# Patient Record
Sex: FEMALE | Race: WHITE | NOT HISPANIC OR LATINO | Employment: UNEMPLOYED | URBAN - METROPOLITAN AREA
[De-identification: names, ages, dates, MRNs, and addresses within clinical notes are randomized per-mention and may not be internally consistent; named-entity substitution may affect disease eponyms.]

---

## 2017-01-04 ENCOUNTER — COMMUNICATION - HEALTHEAST (OUTPATIENT)
Dept: FAMILY MEDICINE | Facility: CLINIC | Age: 62
End: 2017-01-04

## 2017-01-04 DIAGNOSIS — G43.909 MIGRAINE: ICD-10-CM

## 2017-01-04 DIAGNOSIS — G43.909 MIGRAINE HEADACHE: ICD-10-CM

## 2017-02-06 ENCOUNTER — COMMUNICATION - HEALTHEAST (OUTPATIENT)
Dept: FAMILY MEDICINE | Facility: CLINIC | Age: 62
End: 2017-02-06

## 2017-02-06 DIAGNOSIS — G43.909 MIGRAINE: ICD-10-CM

## 2017-02-06 DIAGNOSIS — G43.909 MIGRAINE HEADACHE: ICD-10-CM

## 2017-02-06 DIAGNOSIS — E03.9 UNSPECIFIED HYPOTHYROIDISM: ICD-10-CM

## 2017-03-10 ENCOUNTER — COMMUNICATION - HEALTHEAST (OUTPATIENT)
Dept: FAMILY MEDICINE | Facility: CLINIC | Age: 62
End: 2017-03-10

## 2017-03-10 DIAGNOSIS — G43.909 MIGRAINE: ICD-10-CM

## 2017-03-10 DIAGNOSIS — G43.909 MIGRAINE HEADACHE: ICD-10-CM

## 2017-04-10 ENCOUNTER — COMMUNICATION - HEALTHEAST (OUTPATIENT)
Dept: FAMILY MEDICINE | Facility: CLINIC | Age: 62
End: 2017-04-10

## 2017-04-10 DIAGNOSIS — G43.909 MIGRAINE: ICD-10-CM

## 2017-04-10 DIAGNOSIS — G43.909 MIGRAINE HEADACHE: ICD-10-CM

## 2017-07-19 ENCOUNTER — COMMUNICATION - HEALTHEAST (OUTPATIENT)
Dept: FAMILY MEDICINE | Facility: CLINIC | Age: 62
End: 2017-07-19

## 2017-07-19 DIAGNOSIS — G43.909 MIGRAINE: ICD-10-CM

## 2017-07-29 ENCOUNTER — COMMUNICATION - HEALTHEAST (OUTPATIENT)
Dept: FAMILY MEDICINE | Facility: CLINIC | Age: 62
End: 2017-07-29

## 2017-07-29 DIAGNOSIS — G43.909 MIGRAINE: ICD-10-CM

## 2017-08-01 ENCOUNTER — COMMUNICATION - HEALTHEAST (OUTPATIENT)
Dept: FAMILY MEDICINE | Facility: CLINIC | Age: 62
End: 2017-08-01

## 2017-08-01 DIAGNOSIS — E03.9 UNSPECIFIED HYPOTHYROIDISM: ICD-10-CM

## 2017-08-01 DIAGNOSIS — I10 ESSENTIAL HYPERTENSION: ICD-10-CM

## 2017-08-20 ENCOUNTER — COMMUNICATION - HEALTHEAST (OUTPATIENT)
Dept: FAMILY MEDICINE | Facility: CLINIC | Age: 62
End: 2017-08-20

## 2017-08-20 DIAGNOSIS — I10 UNSPECIFIED ESSENTIAL HYPERTENSION: ICD-10-CM

## 2017-09-12 ENCOUNTER — COMMUNICATION - HEALTHEAST (OUTPATIENT)
Dept: FAMILY MEDICINE | Facility: CLINIC | Age: 62
End: 2017-09-12

## 2017-09-12 DIAGNOSIS — G43.909 MIGRAINE: ICD-10-CM

## 2017-09-28 ENCOUNTER — COMMUNICATION - HEALTHEAST (OUTPATIENT)
Dept: FAMILY MEDICINE | Facility: CLINIC | Age: 62
End: 2017-09-28

## 2017-09-28 DIAGNOSIS — G43.909 MIGRAINE: ICD-10-CM

## 2017-10-11 ENCOUNTER — COMMUNICATION - HEALTHEAST (OUTPATIENT)
Dept: FAMILY MEDICINE | Facility: CLINIC | Age: 62
End: 2017-10-11

## 2017-10-11 DIAGNOSIS — G43.909 MIGRAINE: ICD-10-CM

## 2017-11-07 ENCOUNTER — COMMUNICATION - HEALTHEAST (OUTPATIENT)
Dept: FAMILY MEDICINE | Facility: CLINIC | Age: 62
End: 2017-11-07

## 2017-11-07 DIAGNOSIS — G43.909 MIGRAINE: ICD-10-CM

## 2017-11-08 ENCOUNTER — COMMUNICATION - HEALTHEAST (OUTPATIENT)
Dept: FAMILY MEDICINE | Facility: CLINIC | Age: 62
End: 2017-11-08

## 2017-11-08 DIAGNOSIS — I10 ESSENTIAL HYPERTENSION: ICD-10-CM

## 2017-11-08 DIAGNOSIS — G43.909 MIGRAINE: ICD-10-CM

## 2017-11-14 ENCOUNTER — COMMUNICATION - HEALTHEAST (OUTPATIENT)
Dept: FAMILY MEDICINE | Facility: CLINIC | Age: 62
End: 2017-11-14

## 2017-11-14 DIAGNOSIS — I10 ESSENTIAL HYPERTENSION: ICD-10-CM

## 2017-11-17 ENCOUNTER — COMMUNICATION - HEALTHEAST (OUTPATIENT)
Dept: FAMILY MEDICINE | Facility: CLINIC | Age: 62
End: 2017-11-17

## 2017-11-17 DIAGNOSIS — I10 ESSENTIAL HYPERTENSION: ICD-10-CM

## 2017-11-28 ENCOUNTER — COMMUNICATION - HEALTHEAST (OUTPATIENT)
Dept: FAMILY MEDICINE | Facility: CLINIC | Age: 62
End: 2017-11-28

## 2017-11-28 DIAGNOSIS — G43.909 MIGRAINE: ICD-10-CM

## 2017-11-28 DIAGNOSIS — E03.9 HYPOTHYROIDISM: ICD-10-CM

## 2017-11-30 ENCOUNTER — COMMUNICATION - HEALTHEAST (OUTPATIENT)
Dept: FAMILY MEDICINE | Facility: CLINIC | Age: 62
End: 2017-11-30

## 2017-11-30 DIAGNOSIS — F32.9 MAJOR DEPRESSIVE DISORDER, SINGLE EPISODE: ICD-10-CM

## 2017-12-07 ENCOUNTER — COMMUNICATION - HEALTHEAST (OUTPATIENT)
Dept: FAMILY MEDICINE | Facility: CLINIC | Age: 62
End: 2017-12-07

## 2017-12-07 DIAGNOSIS — G43.909 MIGRAINE: ICD-10-CM

## 2017-12-18 ENCOUNTER — OFFICE VISIT - HEALTHEAST (OUTPATIENT)
Dept: FAMILY MEDICINE | Facility: CLINIC | Age: 62
End: 2017-12-18

## 2017-12-18 DIAGNOSIS — G43.909 MIGRAINE: ICD-10-CM

## 2017-12-18 DIAGNOSIS — I10 ESSENTIAL HYPERTENSION: ICD-10-CM

## 2017-12-18 DIAGNOSIS — F32.9 MAJOR DEPRESSIVE DISORDER, SINGLE EPISODE: ICD-10-CM

## 2017-12-18 DIAGNOSIS — E03.9 HYPOTHYROIDISM: ICD-10-CM

## 2017-12-18 DIAGNOSIS — Z12.31 VISIT FOR SCREENING MAMMOGRAM: ICD-10-CM

## 2017-12-18 LAB
CHOLEST SERPL-MCNC: 157 MG/DL
FASTING STATUS PATIENT QL REPORTED: NO
HDLC SERPL-MCNC: 43 MG/DL
LDLC SERPL CALC-MCNC: 96 MG/DL
TRIGL SERPL-MCNC: 90 MG/DL

## 2018-01-16 ENCOUNTER — COMMUNICATION - HEALTHEAST (OUTPATIENT)
Dept: FAMILY MEDICINE | Facility: CLINIC | Age: 63
End: 2018-01-16

## 2018-01-30 ENCOUNTER — COMMUNICATION - HEALTHEAST (OUTPATIENT)
Dept: FAMILY MEDICINE | Facility: CLINIC | Age: 63
End: 2018-01-30

## 2018-01-30 DIAGNOSIS — G43.909 MIGRAINE: ICD-10-CM

## 2018-03-26 ENCOUNTER — COMMUNICATION - HEALTHEAST (OUTPATIENT)
Dept: FAMILY MEDICINE | Facility: CLINIC | Age: 63
End: 2018-03-26

## 2018-03-26 DIAGNOSIS — G43.909 MIGRAINE: ICD-10-CM

## 2018-04-04 ENCOUNTER — COMMUNICATION - HEALTHEAST (OUTPATIENT)
Dept: FAMILY MEDICINE | Facility: CLINIC | Age: 63
End: 2018-04-04

## 2018-04-04 DIAGNOSIS — G43.909 MIGRAINE: ICD-10-CM

## 2018-05-14 ENCOUNTER — COMMUNICATION - HEALTHEAST (OUTPATIENT)
Dept: FAMILY MEDICINE | Facility: CLINIC | Age: 63
End: 2018-05-14

## 2018-05-14 DIAGNOSIS — G43.909 MIGRAINE: ICD-10-CM

## 2018-06-27 ENCOUNTER — COMMUNICATION - HEALTHEAST (OUTPATIENT)
Dept: FAMILY MEDICINE | Facility: CLINIC | Age: 63
End: 2018-06-27

## 2018-06-27 DIAGNOSIS — G43.909 MIGRAINE: ICD-10-CM

## 2018-08-07 ENCOUNTER — COMMUNICATION - HEALTHEAST (OUTPATIENT)
Dept: FAMILY MEDICINE | Facility: CLINIC | Age: 63
End: 2018-08-07

## 2018-08-07 DIAGNOSIS — G43.909 MIGRAINE: ICD-10-CM

## 2018-08-16 ENCOUNTER — COMMUNICATION - HEALTHEAST (OUTPATIENT)
Dept: FAMILY MEDICINE | Facility: CLINIC | Age: 63
End: 2018-08-16

## 2018-08-16 DIAGNOSIS — E03.9 HYPOTHYROIDISM: ICD-10-CM

## 2018-08-16 DIAGNOSIS — I10 ESSENTIAL HYPERTENSION: ICD-10-CM

## 2018-09-06 ENCOUNTER — COMMUNICATION - HEALTHEAST (OUTPATIENT)
Dept: FAMILY MEDICINE | Facility: CLINIC | Age: 63
End: 2018-09-06

## 2018-09-06 DIAGNOSIS — G43.909 MIGRAINE: ICD-10-CM

## 2018-09-22 ENCOUNTER — COMMUNICATION - HEALTHEAST (OUTPATIENT)
Dept: FAMILY MEDICINE | Facility: CLINIC | Age: 63
End: 2018-09-22

## 2018-09-22 DIAGNOSIS — I10 ESSENTIAL HYPERTENSION: ICD-10-CM

## 2018-10-01 ENCOUNTER — COMMUNICATION - HEALTHEAST (OUTPATIENT)
Dept: FAMILY MEDICINE | Facility: CLINIC | Age: 63
End: 2018-10-01

## 2018-10-01 DIAGNOSIS — G43.909 MIGRAINE: ICD-10-CM

## 2018-10-18 ENCOUNTER — COMMUNICATION - HEALTHEAST (OUTPATIENT)
Dept: FAMILY MEDICINE | Facility: CLINIC | Age: 63
End: 2018-10-18

## 2018-10-18 DIAGNOSIS — G43.909 MIGRAINE: ICD-10-CM

## 2018-11-16 ENCOUNTER — COMMUNICATION - HEALTHEAST (OUTPATIENT)
Dept: FAMILY MEDICINE | Facility: CLINIC | Age: 63
End: 2018-11-16

## 2018-11-16 DIAGNOSIS — G43.909 MIGRAINE: ICD-10-CM

## 2018-11-29 ENCOUNTER — COMMUNICATION - HEALTHEAST (OUTPATIENT)
Dept: FAMILY MEDICINE | Facility: CLINIC | Age: 63
End: 2018-11-29

## 2018-11-29 DIAGNOSIS — G43.909 MIGRAINE: ICD-10-CM

## 2018-11-29 DIAGNOSIS — I10 ESSENTIAL HYPERTENSION: ICD-10-CM

## 2018-12-14 ENCOUNTER — COMMUNICATION - HEALTHEAST (OUTPATIENT)
Dept: FAMILY MEDICINE | Facility: CLINIC | Age: 63
End: 2018-12-14

## 2018-12-14 DIAGNOSIS — E03.9 HYPOTHYROIDISM: ICD-10-CM

## 2019-01-11 ENCOUNTER — COMMUNICATION - HEALTHEAST (OUTPATIENT)
Dept: FAMILY MEDICINE | Facility: CLINIC | Age: 64
End: 2019-01-11

## 2019-01-11 DIAGNOSIS — I10 ESSENTIAL HYPERTENSION: ICD-10-CM

## 2019-01-11 DIAGNOSIS — G43.909 MIGRAINE: ICD-10-CM

## 2019-01-21 ENCOUNTER — COMMUNICATION - HEALTHEAST (OUTPATIENT)
Dept: FAMILY MEDICINE | Facility: CLINIC | Age: 64
End: 2019-01-21

## 2019-01-21 DIAGNOSIS — G43.909 MIGRAINE: ICD-10-CM

## 2019-02-20 ENCOUNTER — COMMUNICATION - HEALTHEAST (OUTPATIENT)
Dept: FAMILY MEDICINE | Facility: CLINIC | Age: 64
End: 2019-02-20

## 2019-02-20 DIAGNOSIS — G43.909 MIGRAINE: ICD-10-CM

## 2019-03-09 ENCOUNTER — COMMUNICATION - HEALTHEAST (OUTPATIENT)
Dept: FAMILY MEDICINE | Facility: CLINIC | Age: 64
End: 2019-03-09

## 2019-03-09 DIAGNOSIS — E03.9 HYPOTHYROIDISM: ICD-10-CM

## 2019-03-09 DIAGNOSIS — G43.909 MIGRAINE: ICD-10-CM

## 2019-03-09 DIAGNOSIS — I10 ESSENTIAL HYPERTENSION: ICD-10-CM

## 2019-03-26 ENCOUNTER — OFFICE VISIT - HEALTHEAST (OUTPATIENT)
Dept: FAMILY MEDICINE | Facility: CLINIC | Age: 64
End: 2019-03-26

## 2019-03-26 DIAGNOSIS — E03.9 HYPOTHYROIDISM: ICD-10-CM

## 2019-03-26 DIAGNOSIS — G43.909 MIGRAINE: ICD-10-CM

## 2019-03-26 DIAGNOSIS — I10 ESSENTIAL HYPERTENSION: ICD-10-CM

## 2019-03-26 DIAGNOSIS — F32.9 MAJOR DEPRESSIVE DISORDER, SINGLE EPISODE: ICD-10-CM

## 2019-03-26 LAB
ALBUMIN SERPL-MCNC: 4.1 G/DL (ref 3.5–5)
ALP SERPL-CCNC: 115 U/L (ref 45–120)
ALT SERPL W P-5'-P-CCNC: 14 U/L (ref 0–45)
ANION GAP SERPL CALCULATED.3IONS-SCNC: 12 MMOL/L (ref 5–18)
AST SERPL W P-5'-P-CCNC: 14 U/L (ref 0–40)
BILIRUB SERPL-MCNC: 0.2 MG/DL (ref 0–1)
BUN SERPL-MCNC: 14 MG/DL (ref 8–22)
CALCIUM SERPL-MCNC: 9.8 MG/DL (ref 8.5–10.5)
CHLORIDE BLD-SCNC: 107 MMOL/L (ref 98–107)
CHOLEST SERPL-MCNC: 196 MG/DL
CO2 SERPL-SCNC: 23 MMOL/L (ref 22–31)
CREAT SERPL-MCNC: 0.65 MG/DL (ref 0.6–1.1)
FASTING STATUS PATIENT QL REPORTED: NO
GFR SERPL CREATININE-BSD FRML MDRD: >60 ML/MIN/1.73M2
GLUCOSE BLD-MCNC: 108 MG/DL (ref 70–125)
HDLC SERPL-MCNC: 72 MG/DL
LDLC SERPL CALC-MCNC: 106 MG/DL
POTASSIUM BLD-SCNC: 3.6 MMOL/L (ref 3.5–5)
PROT SERPL-MCNC: 7.3 G/DL (ref 6–8)
SODIUM SERPL-SCNC: 142 MMOL/L (ref 136–145)
TRIGL SERPL-MCNC: 91 MG/DL
TSH SERPL DL<=0.005 MIU/L-ACNC: 0.06 UIU/ML (ref 0.3–5)

## 2019-03-26 ASSESSMENT — MIFFLIN-ST. JEOR: SCORE: 850.69

## 2019-04-01 ENCOUNTER — COMMUNICATION - HEALTHEAST (OUTPATIENT)
Dept: FAMILY MEDICINE | Facility: CLINIC | Age: 64
End: 2019-04-01

## 2019-04-01 ENCOUNTER — AMBULATORY - HEALTHEAST (OUTPATIENT)
Dept: FAMILY MEDICINE | Facility: CLINIC | Age: 64
End: 2019-04-01

## 2019-04-01 DIAGNOSIS — E03.9 HYPOTHYROIDISM: ICD-10-CM

## 2019-04-21 ENCOUNTER — COMMUNICATION - HEALTHEAST (OUTPATIENT)
Dept: FAMILY MEDICINE | Facility: CLINIC | Age: 64
End: 2019-04-21

## 2019-04-21 DIAGNOSIS — G43.909 MIGRAINE: ICD-10-CM

## 2019-04-25 ENCOUNTER — COMMUNICATION - HEALTHEAST (OUTPATIENT)
Dept: FAMILY MEDICINE | Facility: CLINIC | Age: 64
End: 2019-04-25

## 2019-04-25 DIAGNOSIS — G43.909 MIGRAINE: ICD-10-CM

## 2019-05-03 ENCOUNTER — COMMUNICATION - HEALTHEAST (OUTPATIENT)
Dept: FAMILY MEDICINE | Facility: CLINIC | Age: 64
End: 2019-05-03

## 2019-05-18 ENCOUNTER — COMMUNICATION - HEALTHEAST (OUTPATIENT)
Dept: FAMILY MEDICINE | Facility: CLINIC | Age: 64
End: 2019-05-18

## 2019-05-18 DIAGNOSIS — G43.909 MIGRAINE: ICD-10-CM

## 2019-06-16 ENCOUNTER — COMMUNICATION - HEALTHEAST (OUTPATIENT)
Dept: FAMILY MEDICINE | Facility: CLINIC | Age: 64
End: 2019-06-16

## 2019-06-16 DIAGNOSIS — G43.909 MIGRAINE: ICD-10-CM

## 2019-07-12 ENCOUNTER — COMMUNICATION - HEALTHEAST (OUTPATIENT)
Dept: FAMILY MEDICINE | Facility: CLINIC | Age: 64
End: 2019-07-12

## 2019-07-23 ENCOUNTER — COMMUNICATION - HEALTHEAST (OUTPATIENT)
Dept: FAMILY MEDICINE | Facility: CLINIC | Age: 64
End: 2019-07-23

## 2019-07-23 DIAGNOSIS — G43.909 MIGRAINE: ICD-10-CM

## 2019-09-17 ENCOUNTER — COMMUNICATION - HEALTHEAST (OUTPATIENT)
Dept: FAMILY MEDICINE | Facility: CLINIC | Age: 64
End: 2019-09-17

## 2019-09-17 DIAGNOSIS — G43.909 MIGRAINE: ICD-10-CM

## 2019-09-21 ENCOUNTER — COMMUNICATION - HEALTHEAST (OUTPATIENT)
Dept: FAMILY MEDICINE | Facility: CLINIC | Age: 64
End: 2019-09-21

## 2019-09-21 DIAGNOSIS — I10 ESSENTIAL HYPERTENSION: ICD-10-CM

## 2019-10-13 ENCOUNTER — COMMUNICATION - HEALTHEAST (OUTPATIENT)
Dept: FAMILY MEDICINE | Facility: CLINIC | Age: 64
End: 2019-10-13

## 2019-10-13 DIAGNOSIS — G43.909 MIGRAINE: ICD-10-CM

## 2019-10-13 DIAGNOSIS — I10 ESSENTIAL HYPERTENSION: ICD-10-CM

## 2019-10-13 DIAGNOSIS — E03.9 HYPOTHYROIDISM: ICD-10-CM

## 2019-10-13 DIAGNOSIS — F32.9 MAJOR DEPRESSIVE DISORDER, SINGLE EPISODE: ICD-10-CM

## 2019-12-02 ENCOUNTER — COMMUNICATION - HEALTHEAST (OUTPATIENT)
Dept: FAMILY MEDICINE | Facility: CLINIC | Age: 64
End: 2019-12-02

## 2019-12-02 DIAGNOSIS — G43.909 MIGRAINE: ICD-10-CM

## 2019-12-12 ENCOUNTER — COMMUNICATION - HEALTHEAST (OUTPATIENT)
Dept: FAMILY MEDICINE | Facility: CLINIC | Age: 64
End: 2019-12-12

## 2019-12-12 DIAGNOSIS — I10 ESSENTIAL HYPERTENSION: ICD-10-CM

## 2019-12-12 DIAGNOSIS — G43.909 MIGRAINE: ICD-10-CM

## 2019-12-15 ENCOUNTER — COMMUNICATION - HEALTHEAST (OUTPATIENT)
Dept: FAMILY MEDICINE | Facility: CLINIC | Age: 64
End: 2019-12-15

## 2019-12-15 DIAGNOSIS — G43.909 MIGRAINE: ICD-10-CM

## 2019-12-17 ENCOUNTER — COMMUNICATION - HEALTHEAST (OUTPATIENT)
Dept: FAMILY MEDICINE | Facility: CLINIC | Age: 64
End: 2019-12-17

## 2019-12-17 DIAGNOSIS — I10 ESSENTIAL HYPERTENSION: ICD-10-CM

## 2020-01-06 ENCOUNTER — COMMUNICATION - HEALTHEAST (OUTPATIENT)
Dept: FAMILY MEDICINE | Facility: CLINIC | Age: 65
End: 2020-01-06

## 2020-01-06 DIAGNOSIS — G43.909 MIGRAINE: ICD-10-CM

## 2020-02-08 ENCOUNTER — COMMUNICATION - HEALTHEAST (OUTPATIENT)
Dept: FAMILY MEDICINE | Facility: CLINIC | Age: 65
End: 2020-02-08

## 2020-02-08 DIAGNOSIS — F32.9 MAJOR DEPRESSIVE DISORDER, SINGLE EPISODE: ICD-10-CM

## 2020-02-08 DIAGNOSIS — I10 ESSENTIAL HYPERTENSION: ICD-10-CM

## 2020-02-21 ENCOUNTER — COMMUNICATION - HEALTHEAST (OUTPATIENT)
Dept: FAMILY MEDICINE | Facility: CLINIC | Age: 65
End: 2020-02-21

## 2020-02-21 DIAGNOSIS — E03.9 HYPOTHYROIDISM: ICD-10-CM

## 2020-02-21 DIAGNOSIS — G43.909 MIGRAINE: ICD-10-CM

## 2020-02-24 ENCOUNTER — COMMUNICATION - HEALTHEAST (OUTPATIENT)
Dept: FAMILY MEDICINE | Facility: CLINIC | Age: 65
End: 2020-02-24

## 2020-02-24 DIAGNOSIS — E03.9 HYPOTHYROIDISM: ICD-10-CM

## 2020-03-24 ENCOUNTER — COMMUNICATION - HEALTHEAST (OUTPATIENT)
Dept: FAMILY MEDICINE | Facility: CLINIC | Age: 65
End: 2020-03-24

## 2020-03-25 ENCOUNTER — AMBULATORY - HEALTHEAST (OUTPATIENT)
Dept: FAMILY MEDICINE | Facility: CLINIC | Age: 65
End: 2020-03-25

## 2020-03-25 DIAGNOSIS — F32.9 MAJOR DEPRESSIVE DISORDER, SINGLE EPISODE: ICD-10-CM

## 2020-03-25 DIAGNOSIS — E03.9 HYPOTHYROIDISM: ICD-10-CM

## 2020-03-25 DIAGNOSIS — G43.909 MIGRAINE: ICD-10-CM

## 2020-03-25 DIAGNOSIS — I10 ESSENTIAL HYPERTENSION: ICD-10-CM

## 2020-03-30 ENCOUNTER — COMMUNICATION - HEALTHEAST (OUTPATIENT)
Dept: FAMILY MEDICINE | Facility: CLINIC | Age: 65
End: 2020-03-30

## 2020-03-30 DIAGNOSIS — G43.909 MIGRAINE: ICD-10-CM

## 2020-03-30 DIAGNOSIS — F32.9 MAJOR DEPRESSIVE DISORDER, SINGLE EPISODE: ICD-10-CM

## 2020-03-30 DIAGNOSIS — E03.9 HYPOTHYROIDISM: ICD-10-CM

## 2020-04-02 ENCOUNTER — COMMUNICATION - HEALTHEAST (OUTPATIENT)
Dept: FAMILY MEDICINE | Facility: CLINIC | Age: 65
End: 2020-04-02

## 2020-04-02 DIAGNOSIS — G43.909 MIGRAINE: ICD-10-CM

## 2020-04-02 DIAGNOSIS — E03.9 HYPOTHYROIDISM: ICD-10-CM

## 2020-04-02 DIAGNOSIS — F32.9 MAJOR DEPRESSIVE DISORDER, SINGLE EPISODE: ICD-10-CM

## 2020-04-02 DIAGNOSIS — I10 ESSENTIAL HYPERTENSION: ICD-10-CM

## 2020-05-17 ENCOUNTER — COMMUNICATION - HEALTHEAST (OUTPATIENT)
Dept: FAMILY MEDICINE | Facility: CLINIC | Age: 65
End: 2020-05-17

## 2020-05-17 DIAGNOSIS — I10 ESSENTIAL HYPERTENSION: ICD-10-CM

## 2020-05-17 DIAGNOSIS — G43.909 MIGRAINE: ICD-10-CM

## 2020-05-17 DIAGNOSIS — F32.9 MAJOR DEPRESSIVE DISORDER, SINGLE EPISODE: ICD-10-CM

## 2020-06-05 ENCOUNTER — COMMUNICATION - HEALTHEAST (OUTPATIENT)
Dept: FAMILY MEDICINE | Facility: CLINIC | Age: 65
End: 2020-06-05

## 2020-06-05 DIAGNOSIS — G43.909 MIGRAINE: ICD-10-CM

## 2020-10-05 ENCOUNTER — COMMUNICATION - HEALTHEAST (OUTPATIENT)
Dept: FAMILY MEDICINE | Facility: CLINIC | Age: 65
End: 2020-10-05

## 2020-10-05 DIAGNOSIS — G43.909 MIGRAINE: ICD-10-CM

## 2020-10-21 ENCOUNTER — COMMUNICATION - HEALTHEAST (OUTPATIENT)
Dept: FAMILY MEDICINE | Facility: CLINIC | Age: 65
End: 2020-10-21

## 2020-10-21 DIAGNOSIS — I10 ESSENTIAL HYPERTENSION: ICD-10-CM

## 2020-10-21 DIAGNOSIS — G43.909 MIGRAINE: ICD-10-CM

## 2020-10-21 DIAGNOSIS — E03.9 HYPOTHYROIDISM: ICD-10-CM

## 2020-11-03 ENCOUNTER — AMBULATORY - HEALTHEAST (OUTPATIENT)
Dept: NURSING | Facility: CLINIC | Age: 65
End: 2020-11-03

## 2020-11-16 ENCOUNTER — RECORDS - HEALTHEAST (OUTPATIENT)
Dept: ADMINISTRATIVE | Facility: OTHER | Age: 65
End: 2020-11-16

## 2020-12-18 ENCOUNTER — COMMUNICATION - HEALTHEAST (OUTPATIENT)
Dept: ADMINISTRATIVE | Facility: CLINIC | Age: 65
End: 2020-12-18

## 2020-12-21 ENCOUNTER — COMMUNICATION - HEALTHEAST (OUTPATIENT)
Dept: FAMILY MEDICINE | Facility: CLINIC | Age: 65
End: 2020-12-21

## 2020-12-21 DIAGNOSIS — I10 ESSENTIAL HYPERTENSION: ICD-10-CM

## 2020-12-21 DIAGNOSIS — F32.9 MAJOR DEPRESSIVE DISORDER, SINGLE EPISODE: ICD-10-CM

## 2020-12-21 DIAGNOSIS — G43.909 MIGRAINE: ICD-10-CM

## 2020-12-21 RX ORDER — ESCITALOPRAM OXALATE 20 MG/1
TABLET ORAL
Qty: 90 TABLET | Refills: 0 | Status: SHIPPED | OUTPATIENT
Start: 2020-12-21 | End: 2022-01-28

## 2020-12-21 RX ORDER — INDOMETHACIN 25 MG/1
CAPSULE ORAL
Qty: 270 CAPSULE | Refills: 0 | Status: SHIPPED | OUTPATIENT
Start: 2020-12-21 | End: 2022-01-28

## 2020-12-21 RX ORDER — AMLODIPINE BESYLATE 10 MG/1
TABLET ORAL
Qty: 90 TABLET | Refills: 0 | Status: SHIPPED | OUTPATIENT
Start: 2020-12-21 | End: 2022-01-28

## 2020-12-21 RX ORDER — POTASSIUM CHLORIDE 750 MG/1
TABLET, EXTENDED RELEASE ORAL
Qty: 90 TABLET | Refills: 0 | Status: SHIPPED | OUTPATIENT
Start: 2020-12-21 | End: 2022-01-28

## 2021-02-22 ENCOUNTER — COMMUNICATION - HEALTHEAST (OUTPATIENT)
Dept: FAMILY MEDICINE | Facility: CLINIC | Age: 66
End: 2021-02-22

## 2021-02-22 DIAGNOSIS — I10 ESSENTIAL HYPERTENSION: ICD-10-CM

## 2021-02-22 DIAGNOSIS — F32.9 MAJOR DEPRESSIVE DISORDER, SINGLE EPISODE: ICD-10-CM

## 2021-02-22 DIAGNOSIS — G43.909 MIGRAINE: ICD-10-CM

## 2021-04-07 ENCOUNTER — COMMUNICATION - HEALTHEAST (OUTPATIENT)
Dept: FAMILY MEDICINE | Facility: CLINIC | Age: 66
End: 2021-04-07

## 2021-04-07 DIAGNOSIS — I10 ESSENTIAL HYPERTENSION: ICD-10-CM

## 2021-04-09 RX ORDER — LISINOPRIL 20 MG/1
TABLET ORAL
Qty: 60 TABLET | Refills: 0 | Status: SHIPPED | OUTPATIENT
Start: 2021-04-09 | End: 2021-09-28

## 2021-05-27 ENCOUNTER — RECORDS - HEALTHEAST (OUTPATIENT)
Dept: ADMINISTRATIVE | Facility: CLINIC | Age: 66
End: 2021-05-27

## 2021-05-27 NOTE — PROGRESS NOTES
ASSESSMENT/PLAN:  1. Migraine  amitriptyline (ELAVIL) 25 MG tablet    SUMAtriptan (IMITREX) 50 MG tablet    butalbital-acetaminophen-caffeine-codeine (FIORICET WITH CODEINE) -99-30 mg per capsule    diclofenac (VOLTAREN) 75 MG EC tablet    indomethacin (INDOCIN) 25 MG capsule    tiZANidine (ZANAFLEX) 4 MG tablet   2. Essential hypertension  amLODIPine (NORVASC) 10 MG tablet    atenolol (TENORMIN) 100 MG tablet    cloNIDine HCl (CATAPRES) 0.2 MG tablet    lisinopril (PRINIVIL,ZESTRIL) 20 MG tablet    potassium chloride (K-DUR,KLOR-CON) 10 MEQ tablet    Comprehensive Metabolic Panel    Lipid Profile   3. Major depressive disorder, single episode  escitalopram oxalate (LEXAPRO) 20 MG tablet   4. Hypothyroidism  levothyroxine (SYNTHROID, LEVOTHROID) 100 MCG tablet    Thyroid Stimulating Hormone (TSH)       This is a 62 yo female who is here for re-evaluation of chronic medical conditions:  1.  Migraine headaches - still has frequent headaches - uses her Fioricet with codeine at fairly rapid frequency - will continue (no increasing frequency); also has amitriptyline for prevention, Sumatriptan as needed for headache, diclofenac prn.    2. Hypertension - blood pressure is  Stable today at 124/70.  Continue to monitor.   3.  Depression - Stable currently on Escitalopram.  Has had major life stressors - including death of her , difficulties with her daughter (who she describes as an alcoholic).    4.  Hypothyroidism - check TSH; refill levothyroxine.                                                       No Follow-up on file.      Medications Discontinued During This Encounter   Medication Reason     amitriptyline (ELAVIL) 25 MG tablet Reorder     amLODIPine (NORVASC) 10 MG tablet Reorder     atenolol (TENORMIN) 100 MG tablet Reorder     cloNIDine HCl (CATAPRES) 0.2 MG tablet Reorder     escitalopram oxalate (LEXAPRO) 20 MG tablet Reorder     levothyroxine (SYNTHROID, LEVOTHROID) 100 MCG tablet Reorder      "lisinopril (PRINIVIL,ZESTRIL) 20 MG tablet Reorder     SUMAtriptan (IMITREX) 50 MG tablet Reorder     butalbital-acetaminophen-caffeine-codeine (FIORICET WITH CODEINE) -28-30 mg per capsule Reorder     diclofenac (VOLTAREN) 75 MG EC tablet Reorder     indomethacin (INDOCIN) 25 MG capsule Reorder     potassium chloride SA (K-DUR,KLOR-CON) 10 MEQ tablet Reorder     tiZANidine (ZANAFLEX) 4 MG tablet Reorder     There are no Patient Instructions on file for this visit.    Chief Complaint:  Chief Complaint   Patient presents with     Medication Refill     med refills on all       HPI:   Keyanna Palafox is a 63 y.o. female c/o   passed away in November 2018    Migraines - \"same\" - sometimes worse if deals with daughter  Doesn't talk to daughter often - about once a week    Blood pressure is good - was \"stretching out\" her pills      PMH:   Patient Active Problem List    Diagnosis Date Noted     Syncope 02/06/2015     Internal Derangement Of Left Knee      Viral Syndrome      Acute Bronchitis      Muscle Aches, Generalized (Myalgias)      Pain During Urination (Dysuria)      Pneumonia      Closed Fracture Of The Distal End Of The Radius      Acute Sinusitis      Allergies      Abnormal Weight Loss      Major depressive disorder, single episode      Hypothyroidism      Migraine Headache      Impingement Of The Right Shoulder      Hypertension      History reviewed. No pertinent past medical history.  Past Surgical History:   Procedure Laterality Date     ORIF FOOT FRACTURE Right     dislocation (fell off ladder)     TONSILLECTOMY       TUBAL LIGATION       Social History     Socioeconomic History     Marital status:      Spouse name: Not on file     Number of children: Not on file     Years of education: Not on file     Highest education level: Not on file   Occupational History     Not on file   Social Needs     Financial resource strain: Not on file     Food insecurity:     Worry: Not on file     " Inability: Not on file     Transportation needs:     Medical: Not on file     Non-medical: Not on file   Tobacco Use     Smoking status: Current Every Day Smoker     Types: Cigarettes     Smokeless tobacco: Former User   Substance and Sexual Activity     Alcohol use: Not on file     Drug use: Not on file     Sexual activity: Not on file   Lifestyle     Physical activity:     Days per week: Not on file     Minutes per session: Not on file     Stress: Not on file   Relationships     Social connections:     Talks on phone: Not on file     Gets together: Not on file     Attends [employer] witness service: Not on file     Active member of club or organization: Not on file     Attends meetings of clubs or organizations: Not on file     Relationship status: Not on file     Intimate partner violence:     Fear of current or ex partner: Not on file     Emotionally abused: Not on file     Physically abused: Not on file     Forced sexual activity: Not on file   Other Topics Concern     Not on file   Social History Narrative     2018 ( was 82)     Family History   Problem Relation Age of Onset     Cerebral aneurysm Mother 83     Acute Myocardial Infarction Father          from the 8th MI     Alcohol abuse Daughter        Meds:    Current Outpatient Medications:      amitriptyline (ELAVIL) 25 MG tablet, TAKE 1 TABLET BY MOUTH EVERY NIGHT AT BEDTIME, Disp: 30 tablet, Rfl: 6     amLODIPine (NORVASC) 10 MG tablet, Take 1 tablet (10 mg total) by mouth daily., Disp: 90 tablet, Rfl: 1     atenolol (TENORMIN) 100 MG tablet, Take 1 tablet (100 mg total) by mouth daily., Disp: 90 tablet, Rfl: 1     butalbital-acetaminophen-caffeine-codeine (FIORICET WITH CODEINE) -12-30 mg per capsule, TAKE 1 CAPSULE BY MOUTH FOUR TIMES DAILY AS NEEDED FOR HEADACHE, Disp: 30 capsule, Rfl: 0     butalbital-acetaminophen-caffeine-codeine (FIORICET WITH CODEINE) -15-30 mg per capsule, TAKE 1 CAPSULE BY MOUTH FOUR TIMES DAILY AS  NEEDED FOR HEADACHE, Disp: 30 capsule, Rfl: 0     cloNIDine HCl (CATAPRES) 0.2 MG tablet, Take 1 tablet (0.2 mg total) by mouth at bedtime., Disp: 30 tablet, Rfl: 6     diclofenac (VOLTAREN) 75 MG EC tablet, TAKE 1 TABLET(75 MG) BY MOUTH TWICE DAILY AS NEEDED FOR HEADACHES, Disp: 60 tablet, Rfl: 6     escitalopram oxalate (LEXAPRO) 20 MG tablet, TAKE 1 TABLET BY MOUTH DAILY, Disp: 30 tablet, Rfl: 6     indomethacin (INDOCIN) 25 MG capsule, TAKE 1 CAPSULE BY MOUTH THREE TIMES DAILY AS NEEDED FOR HEADACHE, Disp: 90 capsule, Rfl: 1     levothyroxine (SYNTHROID, LEVOTHROID) 100 MCG tablet, TAKE 1 TABLET(100 MCG) BY MOUTH DAILY, Disp: 90 tablet, Rfl: 1     lisinopril (PRINIVIL,ZESTRIL) 20 MG tablet, TAKE 1 TABLET(20 MG) BY MOUTH TWICE DAILY, Disp: 180 tablet, Rfl: 1     potassium chloride (K-DUR,KLOR-CON) 10 MEQ tablet, TAKE 1 TABLET BY MOUTH DAILY, Disp: 30 tablet, Rfl: 6     SUMAtriptan (IMITREX) 50 MG tablet, TAKE 1 TABLET BY MOUTH AT ONSET OF HEADACHE. MAY REPEAT IN 2 HOURS, Disp: 18 tablet, Rfl: 1     tiZANidine (ZANAFLEX) 4 MG tablet, TAKE 1 TABLET BY MOUTH TWICE DAILY AS NEEDED FOR HEADACHE OR MUSCLE SPASM, Disp: 60 tablet, Rfl: 0    Allergies:  Allergies   Allergen Reactions     Ace Inhibitors Cough     NOTE:  patient doesn't recall this reaction; she is currently on Lisinopril without problems!!       Ampicillin Itching     Sulfa (Sulfonamide Antibiotics) Nausea Only       ROS:  Pertinent positives as noted in HPI; otherwise 12 point ROS negative.      Physical Exam:  EXAM:  /70 (Patient Site: Left Arm, Patient Position: Sitting, Cuff Size: Adult Small)   Pulse 80   Resp 14   Ht 5' (1.524 m)   Wt (!) 85 lb 12.8 oz (38.9 kg)   BMI 16.76 kg/m     Gen:  NAD, appears chronically ill,  well-hydrated, anxious, thin  HEENT:  TMs nl, oropharynx benign, nasal mucosa nl, conjunctiva clear  Neck:  Supple, no adenopathy, no thyromegaly, no carotid bruits, no JVD  Lungs:  Clear to auscultation bilaterally  Cor:   RRR no murmur  Abd:  Soft, nontender, BS+, no masses, no guarding or rebound, no HSM  Extr:  Neg., no edema  Neuro:  No asymmetry, Nl motor tone/strength, nl sensation, reflexes =, gait nl, nl coordination, CN intact,   Skin:  Warm/dry        Results:  Results for orders placed or performed in visit on 03/26/19   Thyroid Stimulating Hormone (TSH)   Result Value Ref Range    TSH 0.06 (L) 0.30 - 5.00 uIU/mL   Comprehensive Metabolic Panel   Result Value Ref Range    Sodium 142 136 - 145 mmol/L    Potassium 3.6 3.5 - 5.0 mmol/L    Chloride 107 98 - 107 mmol/L    CO2 23 22 - 31 mmol/L    Anion Gap, Calculation 12 5 - 18 mmol/L    Glucose 108 70 - 125 mg/dL    BUN 14 8 - 22 mg/dL    Creatinine 0.65 0.60 - 1.10 mg/dL    GFR MDRD Af Amer >60 >60 mL/min/1.73m2    GFR MDRD Non Af Amer >60 >60 mL/min/1.73m2    Bilirubin, Total 0.2 0.0 - 1.0 mg/dL    Calcium 9.8 8.5 - 10.5 mg/dL    Protein, Total 7.3 6.0 - 8.0 g/dL    Albumin 4.1 3.5 - 5.0 g/dL    Alkaline Phosphatase 115 45 - 120 U/L    AST 14 0 - 40 U/L    ALT 14 0 - 45 U/L   Lipid Profile   Result Value Ref Range    Triglycerides 91 <=149 mg/dL    Cholesterol 196 <=199 mg/dL    LDL Calculated 106 <=129 mg/dL    HDL Cholesterol 72 >=50 mg/dL    Patient Fasting > 8hrs? No

## 2021-05-28 ENCOUNTER — COMMUNICATION - HEALTHEAST (OUTPATIENT)
Dept: FAMILY MEDICINE | Facility: CLINIC | Age: 66
End: 2021-05-28

## 2021-05-28 DIAGNOSIS — I10 ESSENTIAL HYPERTENSION: ICD-10-CM

## 2021-05-28 DIAGNOSIS — G43.909 MIGRAINE: ICD-10-CM

## 2021-05-28 DIAGNOSIS — E03.9 HYPOTHYROIDISM: ICD-10-CM

## 2021-05-28 RX ORDER — ATENOLOL 100 MG/1
TABLET ORAL
Qty: 90 TABLET | Refills: 1 | Status: SHIPPED | OUTPATIENT
Start: 2021-05-28 | End: 2022-01-28

## 2021-05-28 RX ORDER — CLONIDINE HYDROCHLORIDE 0.2 MG/1
TABLET ORAL
Qty: 90 TABLET | Refills: 1 | Status: SHIPPED | OUTPATIENT
Start: 2021-05-28 | End: 2022-01-28

## 2021-05-28 RX ORDER — LEVOTHYROXINE SODIUM 88 UG/1
TABLET ORAL
Qty: 90 TABLET | Refills: 1 | Status: SHIPPED | OUTPATIENT
Start: 2021-05-28 | End: 2022-01-28

## 2021-05-28 NOTE — TELEPHONE ENCOUNTER
RN cannot approve Refill Request    RN can NOT refill this medication med is not covered by policy/route to provider. Last office visit: 3/26/2019 Abby Joshi MD Last Physical: Visit date not found Last MTM visit: Visit date not found Last visit same specialty: 3/26/2019 Abby Joshi MD.  Next visit within 3 mo: Visit date not found  Next physical within 3 mo: Visit date not found      Renuka Tam, [facility] Triage/Med Refill 5/18/2019    Requested Prescriptions   Pending Prescriptions Disp Refills     indomethacin (INDOCIN) 25 MG capsule [Pharmacy Med Name: INDOMETHACIN 25MG CAPSULES] 90 capsule 0     Sig: TAKE 1 CAPSULE BY MOUTH THREE TIMES DAILY AS NEEDED FOR HEADACHE       There is no refill protocol information for this order        tiZANidine (ZANAFLEX) 4 MG tablet [Pharmacy Med Name: TIZANIDINE 4MG TABLETS] 60 tablet 0     Sig: TAKE 1 TABLET BY MOUTH TWICE DAILY AS NEEDED FOR HEADACHE OR MUSCLE SPASM       There is no refill protocol information for this order

## 2021-05-28 NOTE — TELEPHONE ENCOUNTER
Central PA team  [phone]  Pool: HE PA MED ([id number])          PA has been initiated.       PA form completed and faxed insurance via Cover My Meds     Key:  [id number]     Medication:  FIORICET -76-30MG    Insurance:  [employer]        Response will be received via fax and may take up to 5-10 business days depending on plan

## 2021-05-28 NOTE — TELEPHONE ENCOUNTER
Fax received from [facility] pharmacy requesting Prior Authorization    Medication Name:   butalbital-acetaminophen-caffeine-codeine (FIORICET WITH CODEINE) -86-30 mg per capsule 30 capsule 0 4/29/2019     Sig: TAKE 1 CAPSULE BY MOUTH FOUR TIMES DAILY AS NEEDED FOR HEADACHE          Insurance Plan: E[employer]   PBM: [employer]   Patient ID Number: [id number]    Please start PA process

## 2021-05-28 NOTE — TELEPHONE ENCOUNTER
RN cannot approve Refill Request    RN can NOT refill this medication med is not covered by policy/route to provider. Last office visit: 3/26/2019 Abby Joshi MD Last Physical: Visit date not found Last MTM visit: Visit date not found Last visit same specialty: 3/26/2019 Abby Joshi MD.  Next visit within 3 mo: Visit date not found  Next physical within 3 mo: Visit date not found      Natalia Moncada, [facility] Triage/Med Refill 4/21/2019    Requested Prescriptions   Pending Prescriptions Disp Refills     tiZANidine (ZANAFLEX) 4 MG tablet [Pharmacy Med Name: TIZANIDINE 4MG TABLETS] 60 tablet 0     Sig: TAKE 1 TABLET BY MOUTH TWICE DAILY AS NEEDED FOR HEADACHE OR MUSCLE SPASM       There is no refill protocol information for this order

## 2021-05-28 NOTE — TELEPHONE ENCOUNTER
Controlled Substance Refill Request  Medication:   Requested Prescriptions     Pending Prescriptions Disp Refills     butalbital-acetaminophen-caffeine-codeine (FIORICET WITH CODEINE) -81-30 mg per capsule [Pharmacy Med Name: BUTAL/ACETA/CAFF/COD -72-30MG] 30 capsule 0     Sig: TAKE 1 CAPSULE BY MOUTH FOUR TIMES DAILY AS NEEDED FOR HEADACHE     Date Last Fill: 3/26/2019  Pharmacy: [facility] [address]  Submit electronically to pharmacy  Controlled Substance Agreement on File:   Encounter-Level CSA Scan Date:    There are no encounter-level csa scan date.       Last office visit:3/26/2019. Last office visit pertaining to requested medication was 3/26/2019 with PCP Dr KESHIA Sykes

## 2021-05-29 ENCOUNTER — RECORDS - HEALTHEAST (OUTPATIENT)
Dept: ADMINISTRATIVE | Facility: CLINIC | Age: 66
End: 2021-05-29

## 2021-05-29 NOTE — TELEPHONE ENCOUNTER
RN cannot approve Refill Request    RN can NOT refill this medication med is not covered by policy/route to provider. Last office visit: 3/26/2019 Abby Joshi MD Last Physical: Visit date not found Last MTM visit: Visit date not found Last visit same specialty: 3/26/2019 Abby Joshi MD.  Next visit within 3 mo: Visit date not found  Next physical within 3 mo: Visit date not found      Renuka Tam, [facility] Triage/Med Refill 6/17/2019    Requested Prescriptions   Pending Prescriptions Disp Refills     indomethacin (INDOCIN) 25 MG capsule [Pharmacy Med Name: INDOMETHACIN 25MG CAPSULES] 90 capsule 0     Sig: TAKE 1 CAPSULE BY MOUTH THREE TIMES DAILY AS NEEDED FOR HEADACHE       There is no refill protocol information for this order        tiZANidine (ZANAFLEX) 4 MG tablet [Pharmacy Med Name: TIZANIDINE 4MG TABLETS] 60 tablet 0     Sig: TAKE 1 TABLET BY MOUTH TWICE DAILY AS NEEDED FOR HEADACHE OR MUSCLE SPASM       There is no refill protocol information for this order

## 2021-05-30 NOTE — TELEPHONE ENCOUNTER
RN cannot approve Refill Request    RN can NOT refill this medication med is not covered by policy/route to provider. Last office visit: 3/26/2019 Abby Joshi MD Last Physical: Visit date not found Last MTM visit: Visit date not found Last visit same specialty: 3/26/2019 Abby Joshi MD.  Next visit within 3 mo: Visit date not found  Next physical within 3 mo: Visit date not found      Renuka Tam, [facility] Connection Triage/Med Refill 7/24/2019    Requested Prescriptions   Pending Prescriptions Disp Refills     tiZANidine (ZANAFLEX) 4 MG tablet [Pharmacy Med Name: TIZANIDINE 4MG TABLETS] 60 tablet 0     Sig: TAKE 1 TABLET BY MOUTH TWICE DAILY AS NEEDED FOR HEADACHE OR MUSCLE SPASM       There is no refill protocol information for this order        indomethacin (INDOCIN) 25 MG capsule [Pharmacy Med Name: INDOMETHACIN 25MG CAPSULES] 90 capsule 0     Sig: TAKE 1 CAPSULE BY MOUTH THREE TIMES DAILY AS NEEDED FOR HEADACHE       There is no refill protocol information for this order

## 2021-05-31 VITALS — WEIGHT: 102.7 LBS

## 2021-06-01 NOTE — TELEPHONE ENCOUNTER
RN cannot approve Refill Request    RN can NOT refill this medication med is not covered by policy/route to provider     . Last office visit: 3/26/2019 Abby Joshi MD Last Physical: Visit date not found Last MTM visit: Visit date not found Last visit same specialty: 3/26/2019 Abby Joshi MD.  Next visit within 3 mo: Visit date not found  Next physical within 3 mo: Visit date not found      Stella Paige, [facility] Triage/Med Refill 9/17/2019    Requested Prescriptions   Pending Prescriptions Disp Refills     indomethacin (INDOCIN) 25 MG capsule [Pharmacy Med Name: INDOMETHACIN 25MG CAPSULES] 90 capsule 0     Sig: TAKE 1 CAPSULE BY MOUTH THREE TIMES DAILY AS NEEDED FOR HEADACHE       There is no refill protocol information for this order

## 2021-06-01 NOTE — TELEPHONE ENCOUNTER
Refill Approved    Rx renewed per Medication Renewal Policy. Medication was last renewed on 3/26/19.    Rosette Gatica, [facility] Connection Triage/Med Refill 9/21/2019     Requested Prescriptions   Pending Prescriptions Disp Refills     lisinopril (PRINIVIL,ZESTRIL) 20 MG tablet [Pharmacy Med Name: LISINOPRIL 20MG TABLETS] 180 tablet 0     Sig: TAKE 1 TABLET(20 MG) BY MOUTH TWICE DAILY       Ace Inhibitors Refill Protocol Passed - 9/21/2019  3:16 AM        Passed - PCP or prescribing provider visit in past 12 months       Last office visit with prescriber/PCP: 3/26/2019 Abby Joshi MD OR same dept: 3/26/2019 Abby Joshi MD OR same specialty: 3/26/2019 Abby Joshi MD  Last physical: Visit date not found Last MTM visit: Visit date not found   Next visit within 3 mo: Visit date not found  Next physical within 3 mo: Visit date not found  Prescriber OR PCP: Abby Joshi MD  Last diagnosis associated with med order: 1. Essential hypertension  - lisinopril (PRINIVIL,ZESTRIL) 20 MG tablet [Pharmacy Med Name: LISINOPRIL 20MG TABLETS]; TAKE 1 TABLET(20 MG) BY MOUTH TWICE DAILY  Dispense: 180 tablet; Refill: 0    If protocol passes may refill for 12 months if within 3 months of last provider visit (or a total of 15 months).             Passed - Serum Potassium in past 12 months     Lab Results   Component Value Date    Potassium 3.6 03/26/2019             Passed - Blood pressure filed in past 12 months     BP Readings from Last 1 Encounters:   03/26/19 124/70             Passed - Serum Creatinine in past 12 months     Creatinine   Date Value Ref Range Status   03/26/2019 0.65 0.60 - 1.10 mg/dL Final

## 2021-06-02 ENCOUNTER — RECORDS - HEALTHEAST (OUTPATIENT)
Dept: ADMINISTRATIVE | Facility: CLINIC | Age: 66
End: 2021-06-02

## 2021-06-02 VITALS — BODY MASS INDEX: 16.85 KG/M2 | HEIGHT: 60 IN | WEIGHT: 85.8 LBS

## 2021-06-02 NOTE — TELEPHONE ENCOUNTER
RN cannot approve Refill Request    RN can NOT refill this medication med is not covered by policy/route to provider. Last office visit: 3/26/2019 Abby Joshi MD Last Physical: Visit date not found Last MTM visit: Visit date not found Last visit same specialty: 3/26/2019 Abby Joshi MD.  Next visit within 3 mo: Visit date not found  Next physical within 3 mo: Visit date not found      Linda Lock, [facility] Connection Triage/Med Refill 10/14/2019    Requested Prescriptions   Pending Prescriptions Disp Refills     indomethacin (INDOCIN) 25 MG capsule [Pharmacy Med Name: INDOMETHACIN 25MG CAPSULES] 90 capsule 0     Sig: TAKE 1 CAPSULE BY MOUTH THREE TIMES DAILY AS NEEDED FOR HEADACHE       There is no refill protocol information for this order      Signed Prescriptions Disp Refills    amLODIPine (NORVASC) 10 MG tablet 90 tablet 0     Sig: TAKE 1 TABLET(10 MG) BY MOUTH DAILY       Calcium-Channel Blockers Protocol Passed - 10/13/2019 10:34 AM        Passed - PCP or prescribing provider visit in past 12 months or next 3 months     Last office visit with prescriber/PCP: 3/26/2019 Abby Joshi MD OR same dept: 3/26/2019 Abby Joshi MD OR same specialty: 3/26/2019 Abby Joshi MD  Last physical: Visit date not found Last MTM visit: Visit date not found   Next visit within 3 mo: Visit date not found  Next physical within 3 mo: Visit date not found  Prescriber OR PCP: Abby Joshi MD  Last diagnosis associated with med order: 1. Migraine  - indomethacin (INDOCIN) 25 MG capsule [Pharmacy Med Name: INDOMETHACIN 25MG CAPSULES]; TAKE 1 CAPSULE BY MOUTH THREE TIMES DAILY AS NEEDED FOR HEADACHE  Dispense: 90 capsule; Refill: 0    2. Essential hypertension  - amLODIPine (NORVASC) 10 MG tablet; TAKE 1 TABLET(10 MG) BY MOUTH DAILY  Dispense: 90 tablet; Refill: 0  - potassium chloride (K-DUR,KLOR-CON) 10 MEQ tablet; TAKE 1 TABLET BY  MOUTH DAILY  Dispense: 90 tablet; Refill: 0    3. Hypothyroidism  - levothyroxine (SYNTHROID, LEVOTHROID) 88 MCG tablet; TAKE 1 TABLET BY MOUTH EVERY DAY  Dispense: 90 tablet; Refill: 0    4. Major depressive disorder, single episode  - escitalopram oxalate (LEXAPRO) 20 MG tablet; TAKE 1 TABLET BY MOUTH DAILY  Dispense: 90 tablet; Refill: 0    If protocol passes may refill for 12 months if within 3 months of last provider visit (or a total of 15 months).             Passed - Blood pressure filed in past 12 months     BP Readings from Last 1 Encounters:   03/26/19 124/70            potassium chloride (K-DUR,KLOR-CON) 10 MEQ tablet 90 tablet 0     Sig: TAKE 1 TABLET BY MOUTH DAILY       Potassium Supplements Refill Protocol Passed - 10/13/2019 10:34 AM        Passed - PCP or prescribing provider visit in past 12 months       Last office visit with prescriber/PCP: 3/26/2019 Abby Joshi MD OR same dept: 3/26/2019 Abby Joshi MD OR same specialty: 3/26/2019 Abby Joshi MD  Last physical: Visit date not found Last MTM visit: Visit date not found   Next visit within 3 mo: Visit date not found  Next physical within 3 mo: Visit date not found  Prescriber OR PCP: Abby Joshi MD  Last diagnosis associated with med order: 1. Migraine  - indomethacin (INDOCIN) 25 MG capsule [Pharmacy Med Name: INDOMETHACIN 25MG CAPSULES]; TAKE 1 CAPSULE BY MOUTH THREE TIMES DAILY AS NEEDED FOR HEADACHE  Dispense: 90 capsule; Refill: 0    2. Essential hypertension  - amLODIPine (NORVASC) 10 MG tablet; TAKE 1 TABLET(10 MG) BY MOUTH DAILY  Dispense: 90 tablet; Refill: 0  - potassium chloride (K-DUR,KLOR-CON) 10 MEQ tablet; TAKE 1 TABLET BY MOUTH DAILY  Dispense: 90 tablet; Refill: 0    3. Hypothyroidism  - levothyroxine (SYNTHROID, LEVOTHROID) 88 MCG tablet; TAKE 1 TABLET BY MOUTH EVERY DAY  Dispense: 90 tablet; Refill: 0    4. Major depressive disorder, single episode  -  escitalopram oxalate (LEXAPRO) 20 MG tablet; TAKE 1 TABLET BY MOUTH DAILY  Dispense: 90 tablet; Refill: 0    If protocol passes may refill for 12 months if within 3 months of last provider visit (or a total of 15 months).             Passed - Potassium level in last 12 months     Lab Results   Component Value Date    Potassium 3.6 03/26/2019            levothyroxine (SYNTHROID, LEVOTHROID) 88 MCG tablet 90 tablet 0     Sig: TAKE 1 TABLET BY MOUTH EVERY DAY       Thyroid Hormones Protocol Passed - 10/13/2019 10:34 AM        Passed - Provider visit in past 12 months or next 3 months     Last office visit with prescriber/PCP: 3/26/2019 Abby Joshi MD OR same dept: 3/26/2019 Abby Joshi MD OR same specialty: 3/26/2019 Abby Joshi MD  Last physical: Visit date not found Last MTM visit: Visit date not found   Next visit within 3 mo: Visit date not found  Next physical within 3 mo: Visit date not found  Prescriber OR PCP: Abby Joshi MD  Last diagnosis associated with med order: 1. Migraine  - indomethacin (INDOCIN) 25 MG capsule [Pharmacy Med Name: INDOMETHACIN 25MG CAPSULES]; TAKE 1 CAPSULE BY MOUTH THREE TIMES DAILY AS NEEDED FOR HEADACHE  Dispense: 90 capsule; Refill: 0    2. Essential hypertension  - amLODIPine (NORVASC) 10 MG tablet; TAKE 1 TABLET(10 MG) BY MOUTH DAILY  Dispense: 90 tablet; Refill: 0  - potassium chloride (K-DUR,KLOR-CON) 10 MEQ tablet; TAKE 1 TABLET BY MOUTH DAILY  Dispense: 90 tablet; Refill: 0    3. Hypothyroidism  - levothyroxine (SYNTHROID, LEVOTHROID) 88 MCG tablet; TAKE 1 TABLET BY MOUTH EVERY DAY  Dispense: 90 tablet; Refill: 0    4. Major depressive disorder, single episode  - escitalopram oxalate (LEXAPRO) 20 MG tablet; TAKE 1 TABLET BY MOUTH DAILY  Dispense: 90 tablet; Refill: 0    If protocol passes may refill for 12 months if within 3 months of last provider visit (or a total of 15 months).             Passed - TSH on  file in past 12 months for patient age 12 & older     TSH   Date Value Ref Range Status   03/26/2019 0.06 (L) 0.30 - 5.00 uIU/mL Final                  escitalopram oxalate (LEXAPRO) 20 MG tablet 90 tablet 0     Sig: TAKE 1 TABLET BY MOUTH DAILY       [facility] Refill Protocol  Passed - 10/13/2019 10:34 AM        Passed - PCP or prescribing provider visit in last year     Last office visit with prescriber/PCP: 3/26/2019 Abby Joshi MD OR same dept: 3/26/2019 Abby Joshi MD OR same specialty: 3/26/2019 Abby Joshi MD  Last physical: Visit date not found Last MTM visit: Visit date not found   Next visit within 3 mo: Visit date not found  Next physical within 3 mo: Visit date not found  Prescriber OR PCP: Abby Joshi MD  Last diagnosis associated with med order: 1. Migraine  - indomethacin (INDOCIN) 25 MG capsule [Pharmacy Med Name: INDOMETHACIN 25MG CAPSULES]; TAKE 1 CAPSULE BY MOUTH THREE TIMES DAILY AS NEEDED FOR HEADACHE  Dispense: 90 capsule; Refill: 0    2. Essential hypertension  - amLODIPine (NORVASC) 10 MG tablet; TAKE 1 TABLET(10 MG) BY MOUTH DAILY  Dispense: 90 tablet; Refill: 0  - potassium chloride (K-DUR,KLOR-CON) 10 MEQ tablet; TAKE 1 TABLET BY MOUTH DAILY  Dispense: 90 tablet; Refill: 0    3. Hypothyroidism  - levothyroxine (SYNTHROID, LEVOTHROID) 88 MCG tablet; TAKE 1 TABLET BY MOUTH EVERY DAY  Dispense: 90 tablet; Refill: 0    4. Major depressive disorder, single episode  - escitalopram oxalate (LEXAPRO) 20 MG tablet; TAKE 1 TABLET BY MOUTH DAILY  Dispense: 90 tablet; Refill: 0    If protocol passes may refill for 12 months if within 3 months of last provider visit (or a total of 15 months).

## 2021-06-02 NOTE — TELEPHONE ENCOUNTER
Refill Approved    Rx renewed per Medication Renewal Policy. Medication was last renewed on 3.26.19.    Linda Lock, [facility] Triage/Med Refill 10/14/2019     Requested Prescriptions   Pending Prescriptions Disp Refills     indomethacin (INDOCIN) 25 MG capsule [Pharmacy Med Name: INDOMETHACIN 25MG CAPSULES] 90 capsule 0     Sig: TAKE 1 CAPSULE BY MOUTH THREE TIMES DAILY AS NEEDED FOR HEADACHE       There is no refill protocol information for this order        amLODIPine (NORVASC) 10 MG tablet [Pharmacy Med Name: AMLODIPINE BESYLATE 10MG TABLETS] 90 tablet 0     Sig: TAKE 1 TABLET(10 MG) BY MOUTH DAILY       Calcium-Channel Blockers Protocol Passed - 10/13/2019 10:34 AM        Passed - PCP or prescribing provider visit in past 12 months or next 3 months     Last office visit with prescriber/PCP: 3/26/2019 Abby Joshi MD OR same dept: 3/26/2019 Abby Joshi MD OR same specialty: 3/26/2019 Abby Joshi MD  Last physical: Visit date not found Last MTM visit: Visit date not found   Next visit within 3 mo: Visit date not found  Next physical within 3 mo: Visit date not found  Prescriber OR PCP: Abby Joshi MD  Last diagnosis associated with med order: 1. Migraine  - indomethacin (INDOCIN) 25 MG capsule [Pharmacy Med Name: INDOMETHACIN 25MG CAPSULES]; TAKE 1 CAPSULE BY MOUTH THREE TIMES DAILY AS NEEDED FOR HEADACHE  Dispense: 90 capsule; Refill: 0    2. Essential hypertension  - amLODIPine (NORVASC) 10 MG tablet [Pharmacy Med Name: AMLODIPINE BESYLATE 10MG TABLETS]; TAKE 1 TABLET(10 MG) BY MOUTH DAILY  Dispense: 90 tablet; Refill: 0  - potassium chloride (K-DUR,KLOR-CON) 10 MEQ tablet [Pharmacy Med Name: POTASSIUM CL MICRO 10MEQ ER TABS]; TAKE 1 TABLET BY MOUTH DAILY  Dispense: 30 tablet; Refill: 0    3. Hypothyroidism  - levothyroxine (SYNTHROID, LEVOTHROID) 88 MCG tablet [Pharmacy Med Name: LEVOTHYROXINE 0.088MG (88MCG) TAB]; TAKE 1 TABLET  BY MOUTH EVERY DAY  Dispense: 30 tablet; Refill: 0    4. Major depressive disorder, single episode  - escitalopram oxalate (LEXAPRO) 20 MG tablet [Pharmacy Med Name: ESCITALOPRAM 20MG TABLETS]; TAKE 1 TABLET BY MOUTH DAILY  Dispense: 30 tablet; Refill: 0    If protocol passes may refill for 12 months if within 3 months of last provider visit (or a total of 15 months).             Passed - Blood pressure filed in past 12 months     BP Readings from Last 1 Encounters:   03/26/19 124/70             potassium chloride (K-DUR,KLOR-CON) 10 MEQ tablet [Pharmacy Med Name: POTASSIUM CL MICRO 10MEQ ER TABS] 30 tablet 0     Sig: TAKE 1 TABLET BY MOUTH DAILY       Potassium Supplements Refill Protocol Passed - 10/13/2019 10:34 AM        Passed - PCP or prescribing provider visit in past 12 months       Last office visit with prescriber/PCP: 3/26/2019 Abby Joshi MD OR same dept: 3/26/2019 Abby Joshi MD OR same specialty: 3/26/2019 Abby Joshi MD  Last physical: Visit date not found Last MTM visit: Visit date not found   Next visit within 3 mo: Visit date not found  Next physical within 3 mo: Visit date not found  Prescriber OR PCP: Abby Joshi MD  Last diagnosis associated with med order: 1. Migraine  - indomethacin (INDOCIN) 25 MG capsule [Pharmacy Med Name: INDOMETHACIN 25MG CAPSULES]; TAKE 1 CAPSULE BY MOUTH THREE TIMES DAILY AS NEEDED FOR HEADACHE  Dispense: 90 capsule; Refill: 0    2. Essential hypertension  - amLODIPine (NORVASC) 10 MG tablet [Pharmacy Med Name: AMLODIPINE BESYLATE 10MG TABLETS]; TAKE 1 TABLET(10 MG) BY MOUTH DAILY  Dispense: 90 tablet; Refill: 0  - potassium chloride (K-DUR,KLOR-CON) 10 MEQ tablet [Pharmacy Med Name: POTASSIUM CL MICRO 10MEQ ER TABS]; TAKE 1 TABLET BY MOUTH DAILY  Dispense: 30 tablet; Refill: 0    3. Hypothyroidism  - levothyroxine (SYNTHROID, LEVOTHROID) 88 MCG tablet [Pharmacy Med Name: LEVOTHYROXINE 0.088MG (88MCG)  TAB]; TAKE 1 TABLET BY MOUTH EVERY DAY  Dispense: 30 tablet; Refill: 0    4. Major depressive disorder, single episode  - escitalopram oxalate (LEXAPRO) 20 MG tablet [Pharmacy Med Name: ESCITALOPRAM 20MG TABLETS]; TAKE 1 TABLET BY MOUTH DAILY  Dispense: 30 tablet; Refill: 0    If protocol passes may refill for 12 months if within 3 months of last provider visit (or a total of 15 months).             Passed - Potassium level in last 12 months     Lab Results   Component Value Date    Potassium 3.6 03/26/2019             levothyroxine (SYNTHROID, LEVOTHROID) 88 MCG tablet [Pharmacy Med Name: LEVOTHYROXINE 0.088MG (88MCG) TAB] 30 tablet 0     Sig: TAKE 1 TABLET BY MOUTH EVERY DAY       Thyroid Hormones Protocol Passed - 10/13/2019 10:34 AM        Passed - Provider visit in past 12 months or next 3 months     Last office visit with prescriber/PCP: 3/26/2019 Abby Joshi MD OR same dept: 3/26/2019 Abby Joshi MD OR same specialty: 3/26/2019 Abby Joshi MD  Last physical: Visit date not found Last MTM visit: Visit date not found   Next visit within 3 mo: Visit date not found  Next physical within 3 mo: Visit date not found  Prescriber OR PCP: Abby Joshi MD  Last diagnosis associated with med order: 1. Migraine  - indomethacin (INDOCIN) 25 MG capsule [Pharmacy Med Name: INDOMETHACIN 25MG CAPSULES]; TAKE 1 CAPSULE BY MOUTH THREE TIMES DAILY AS NEEDED FOR HEADACHE  Dispense: 90 capsule; Refill: 0    2. Essential hypertension  - amLODIPine (NORVASC) 10 MG tablet [Pharmacy Med Name: AMLODIPINE BESYLATE 10MG TABLETS]; TAKE 1 TABLET(10 MG) BY MOUTH DAILY  Dispense: 90 tablet; Refill: 0  - potassium chloride (K-DUR,KLOR-CON) 10 MEQ tablet [Pharmacy Med Name: POTASSIUM CL MICRO 10MEQ ER TABS]; TAKE 1 TABLET BY MOUTH DAILY  Dispense: 30 tablet; Refill: 0    3. Hypothyroidism  - levothyroxine (SYNTHROID, LEVOTHROID) 88 MCG tablet [Pharmacy Med Name: LEVOTHYROXINE  0.088MG (88MCG) TAB]; TAKE 1 TABLET BY MOUTH EVERY DAY  Dispense: 30 tablet; Refill: 0    4. Major depressive disorder, single episode  - escitalopram oxalate (LEXAPRO) 20 MG tablet [Pharmacy Med Name: ESCITALOPRAM 20MG TABLETS]; TAKE 1 TABLET BY MOUTH DAILY  Dispense: 30 tablet; Refill: 0    If protocol passes may refill for 12 months if within 3 months of last provider visit (or a total of 15 months).             Passed - TSH on file in past 12 months for patient age 12 & older     TSH   Date Value Ref Range Status   03/26/2019 0.06 (L) 0.30 - 5.00 uIU/mL Final                   escitalopram oxalate (LEXAPRO) 20 MG tablet [Pharmacy Med Name: ESCITALOPRAM 20MG TABLETS] 30 tablet 0     Sig: TAKE 1 TABLET BY MOUTH DAILY       [facility] Refill Protocol  Passed - 10/13/2019 10:34 AM        Passed - PCP or prescribing provider visit in last year     Last office visit with prescriber/PCP: 3/26/2019 Abby Joshi MD OR same dept: 3/26/2019 Abby Joshi MD OR same specialty: 3/26/2019 Abby Joshi MD  Last physical: Visit date not found Last MTM visit: Visit date not found   Next visit within 3 mo: Visit date not found  Next physical within 3 mo: Visit date not found  Prescriber OR PCP: Abby Joshi MD  Last diagnosis associated with med order: 1. Migraine  - indomethacin (INDOCIN) 25 MG capsule [Pharmacy Med Name: INDOMETHACIN 25MG CAPSULES]; TAKE 1 CAPSULE BY MOUTH THREE TIMES DAILY AS NEEDED FOR HEADACHE  Dispense: 90 capsule; Refill: 0    2. Essential hypertension  - amLODIPine (NORVASC) 10 MG tablet [Pharmacy Med Name: AMLODIPINE BESYLATE 10MG TABLETS]; TAKE 1 TABLET(10 MG) BY MOUTH DAILY  Dispense: 90 tablet; Refill: 0  - potassium chloride (K-DUR,KLOR-CON) 10 MEQ tablet [Pharmacy Med Name: POTASSIUM CL MICRO 10MEQ ER TABS]; TAKE 1 TABLET BY MOUTH DAILY  Dispense: 30 tablet; Refill: 0    3. Hypothyroidism  - levothyroxine (SYNTHROID, LEVOTHROID) 88 MCG tablet  [Pharmacy Med Name: LEVOTHYROXINE 0.088MG (88MCG) TAB]; TAKE 1 TABLET BY MOUTH EVERY DAY  Dispense: 30 tablet; Refill: 0    4. Major depressive disorder, single episode  - escitalopram oxalate (LEXAPRO) 20 MG tablet [Pharmacy Med Name: ESCITALOPRAM 20MG TABLETS]; TAKE 1 TABLET BY MOUTH DAILY  Dispense: 30 tablet; Refill: 0    If protocol passes may refill for 12 months if within 3 months of last provider visit (or a total of 15 months).

## 2021-06-03 ENCOUNTER — RECORDS - HEALTHEAST (OUTPATIENT)
Dept: ADMINISTRATIVE | Facility: CLINIC | Age: 66
End: 2021-06-03

## 2021-06-03 NOTE — TELEPHONE ENCOUNTER
RN cannot approve Refill Request    RN can NOT refill this medication med is not covered by policy/route to provider. Last office visit: 3/26/2019 Abby Joshi MD Last Physical: Visit date not found Last MTM visit: Visit date not found Last visit same specialty: 3/26/2019 Abby Joshi MD.  Next visit within 3 mo: Visit date not found  Next physical within 3 mo: Visit date not found      Rosette Gatica, [facility] Connection Triage/Med Refill 12/2/2019    Requested Prescriptions   Pending Prescriptions Disp Refills     tiZANidine (ZANAFLEX) 4 MG tablet [Pharmacy Med Name: TIZANIDINE 4MG TABLETS] 60 tablet 0     Sig: TAKE 1 TABLET BY MOUTH TWICE DAILY AS NEEDED FOR HEADACHE OR MUSCLE SPASM       There is no refill protocol information for this order

## 2021-06-04 NOTE — TELEPHONE ENCOUNTER
Refill Approved    Rx renewed per Medication Renewal Policy. Medication was last renewed on 9/21/19.    Stella Paige, [facility] Triage/Med Refill 12/19/2019     Requested Prescriptions   Pending Prescriptions Disp Refills     lisinopril (PRINIVIL,ZESTRIL) 20 MG tablet [Pharmacy Med Name: LISINOPRIL 20MG TABLETS] 180 tablet 0     Sig: TAKE 1 TABLET(20 MG) BY MOUTH TWICE DAILY       Ace Inhibitors Refill Protocol Passed - 12/17/2019 10:25 AM        Passed - PCP or prescribing provider visit in past 12 months       Last office visit with prescriber/PCP: 3/26/2019 Abby Joshi MD OR same dept: 3/26/2019 Abby Joshi MD OR same specialty: 3/26/2019 Abby Joshi MD  Last physical: Visit date not found Last MTM visit: Visit date not found   Next visit within 3 mo: Visit date not found  Next physical within 3 mo: Visit date not found  Prescriber OR PCP: Abby Joshi MD  Last diagnosis associated with med order: 1. Essential hypertension  - lisinopril (PRINIVIL,ZESTRIL) 20 MG tablet [Pharmacy Med Name: LISINOPRIL 20MG TABLETS]; TAKE 1 TABLET(20 MG) BY MOUTH TWICE DAILY  Dispense: 180 tablet; Refill: 0    If protocol passes may refill for 12 months if within 3 months of last provider visit (or a total of 15 months).             Passed - Serum Potassium in past 12 months     Lab Results   Component Value Date    Potassium 3.6 03/26/2019             Passed - Blood pressure filed in past 12 months     BP Readings from Last 1 Encounters:   03/26/19 124/70             Passed - Serum Creatinine in past 12 months     Creatinine   Date Value Ref Range Status   03/26/2019 0.65 0.60 - 1.10 mg/dL Final

## 2021-06-04 NOTE — TELEPHONE ENCOUNTER
Refill Approved    Rx renewed per Medication Renewal Policy. Medication was last renewed on 3/26/19.    Stella Paige, [facility] Triage/Med Refill 12/14/2019     Requested Prescriptions   Pending Prescriptions Disp Refills     amitriptyline (ELAVIL) 25 MG tablet [Pharmacy Med Name: AMITRIPTYLINE 25MG TABLETS] 30 tablet 0     Sig: TAKE 1 TABLET BY MOUTH EVERY NIGHT AT BEDTIME       Tricyclics/Misc Antidepressant/Antianxiety Meds Refill Protocol Passed - 12/12/2019  2:34 PM        Passed - PCP or prescribing provider visit in last year     Last office visit with prescriber/PCP: 3/26/2019 Abby Joshi MD OR same dept: 3/26/2019 Abby Joshi MD OR same specialty: 3/26/2019 Abby Joshi MD  Last physical: Visit date not found Last MTM visit: Visit date not found   Next visit within 3 mo: Visit date not found  Next physical within 3 mo: Visit date not found  Prescriber OR PCP: Abby Joshi MD  Last diagnosis associated with med order: 1. Migraine  - amitriptyline (ELAVIL) 25 MG tablet [Pharmacy Med Name: AMITRIPTYLINE 25MG TABLETS]; TAKE 1 TABLET BY MOUTH EVERY NIGHT AT BEDTIME  Dispense: 30 tablet; Refill: 0  - indomethacin (INDOCIN) 25 MG capsule [Pharmacy Med Name: INDOMETHACIN 25MG CAPSULES]; TAKE 1 CAPSULE BY MOUTH THREE TIMES DAILY AS NEEDED FOR HEADACHE  Dispense: 90 capsule; Refill: 0    2. Essential hypertension  - cloNIDine HCl (CATAPRES) 0.2 MG tablet [Pharmacy Med Name: CLONIDINE 0.2MG TABLETS]; TAKE 1 TABLET(0.2 MG) BY MOUTH AT BEDTIME  Dispense: 30 tablet; Refill: 0    If protocol passes may refill for 12 months if within 3 months of last provider visit (or a total of 15 months).             cloNIDine HCl (CATAPRES) 0.2 MG tablet [Pharmacy Med Name: CLONIDINE 0.2MG TABLETS] 30 tablet 0     Sig: TAKE 1 TABLET(0.2 MG) BY MOUTH AT BEDTIME       Clonidine/Hydralazine Refill Protocol Passed - 12/12/2019  2:34 PM        Passed - PCP or  prescribing provider visit in past 12 months       Last office visit with prescriber/PCP: 3/26/2019 Abby Joshi MD OR same dept: 3/26/2019 Abby Joshi MD OR same specialty: 3/26/2019 Abby Joshi MD  Last physical: Visit date not found Last MTM visit: Visit date not found   Next visit within 3 mo: Visit date not found  Next physical within 3 mo: Visit date not found  Prescriber OR PCP: Abby Joshi MD  Last diagnosis associated with med order: 1. Migraine  - amitriptyline (ELAVIL) 25 MG tablet [Pharmacy Med Name: AMITRIPTYLINE 25MG TABLETS]; TAKE 1 TABLET BY MOUTH EVERY NIGHT AT BEDTIME  Dispense: 30 tablet; Refill: 0  - indomethacin (INDOCIN) 25 MG capsule [Pharmacy Med Name: INDOMETHACIN 25MG CAPSULES]; TAKE 1 CAPSULE BY MOUTH THREE TIMES DAILY AS NEEDED FOR HEADACHE  Dispense: 90 capsule; Refill: 0    2. Essential hypertension  - cloNIDine HCl (CATAPRES) 0.2 MG tablet [Pharmacy Med Name: CLONIDINE 0.2MG TABLETS]; TAKE 1 TABLET(0.2 MG) BY MOUTH AT BEDTIME  Dispense: 30 tablet; Refill: 0    If protocol passes may refill for 12 months if within 3 months of last provider visit (or a total of 15 months).             Passed - Blood pressure filed in past 12 months     BP Readings from Last 1 Encounters:   03/26/19 124/70             indomethacin (INDOCIN) 25 MG capsule [Pharmacy Med Name: INDOMETHACIN 25MG CAPSULES] 90 capsule 0     Sig: TAKE 1 CAPSULE BY MOUTH THREE TIMES DAILY AS NEEDED FOR HEADACHE       There is no refill protocol information for this order

## 2021-06-04 NOTE — TELEPHONE ENCOUNTER
RN cannot approve Refill Request    RN can NOT refill this medication med is not covered by policy/route to provider. Last office visit: 3/26/2019 Abby Joshi MD Last Physical: Visit date not found Last MTM visit: Visit date not found Last visit same specialty: 3/26/2019 Abby Joshi MD.  Next visit within 3 mo: Visit date not found  Next physical within 3 mo: Visit date not found      Natalia Moncada, [facility] Triage/Med Refill 12/15/2019    Requested Prescriptions   Pending Prescriptions Disp Refills     indomethacin (INDOCIN) 25 MG capsule [Pharmacy Med Name: INDOMETHACIN 25MG CAPSULES] 270 capsule 0     Sig: TAKE 1 CAPSULE BY MOUTH THREE TIMES DAILY AS NEEDED FOR HEADACHE       There is no refill protocol information for this order

## 2021-06-04 NOTE — TELEPHONE ENCOUNTER
RN cannot approve Refill Request    RN can NOT refill this medication med is not covered by policy/route to provider.      Stella Paige, [facility] Triage/Med Refill 12/14/2019    Requested Prescriptions   Pending Prescriptions Disp Refills     indomethacin (INDOCIN) 25 MG capsule [Pharmacy Med Name: INDOMETHACIN 25MG CAPSULES] 90 capsule 0     Sig: TAKE 1 CAPSULE BY MOUTH THREE TIMES DAILY AS NEEDED FOR HEADACHE       There is no refill protocol information for this order      Signed Prescriptions Disp Refills    amitriptyline (ELAVIL) 25 MG tablet 90 tablet 0     Sig: TAKE 1 TABLET BY MOUTH EVERY NIGHT AT BEDTIME       Tricyclics/Misc Antidepressant/Antianxiety Meds Refill Protocol Passed - 12/12/2019  2:34 PM        Passed - PCP or prescribing provider visit in last year     Last office visit with prescriber/PCP: 3/26/2019 Abby Joshi MD OR same dept: 3/26/2019 Abby Joshi MD OR same specialty: 3/26/2019 Abby Joshi MD  Last physical: Visit date not found Last MTM visit: Visit date not found   Next visit within 3 mo: Visit date not found  Next physical within 3 mo: Visit date not found  Prescriber OR PCP: Abby Joshi MD  Last diagnosis associated with med order: 1. Migraine  - amitriptyline (ELAVIL) 25 MG tablet; TAKE 1 TABLET BY MOUTH EVERY NIGHT AT BEDTIME  Dispense: 90 tablet; Refill: 0  - indomethacin (INDOCIN) 25 MG capsule [Pharmacy Med Name: INDOMETHACIN 25MG CAPSULES]; TAKE 1 CAPSULE BY MOUTH THREE TIMES DAILY AS NEEDED FOR HEADACHE  Dispense: 90 capsule; Refill: 0    2. Essential hypertension  - cloNIDine HCl (CATAPRES) 0.2 MG tablet; TAKE 1 TABLET(0.2 MG) BY MOUTH AT BEDTIME  Dispense: 90 tablet; Refill: 0    If protocol passes may refill for 12 months if within 3 months of last provider visit (or a total of 15 months).            cloNIDine HCl (CATAPRES) 0.2 MG tablet 90 tablet 0     Sig: TAKE 1 TABLET(0.2 MG) BY MOUTH AT BEDTIME        Clonidine/Hydralazine Refill Protocol Passed - 12/12/2019  2:34 PM        Passed - PCP or prescribing provider visit in past 12 months       Last office visit with prescriber/PCP: 3/26/2019 Abby Joshi MD OR same dept: 3/26/2019 Abby Joshi MD OR same specialty: 3/26/2019 Abby Joshi MD  Last physical: Visit date not found Last MTM visit: Visit date not found   Next visit within 3 mo: Visit date not found  Next physical within 3 mo: Visit date not found  Prescriber OR PCP: Abby Joshi MD  Last diagnosis associated with med order: 1. Migraine  - amitriptyline (ELAVIL) 25 MG tablet; TAKE 1 TABLET BY MOUTH EVERY NIGHT AT BEDTIME  Dispense: 90 tablet; Refill: 0  - indomethacin (INDOCIN) 25 MG capsule [Pharmacy Med Name: INDOMETHACIN 25MG CAPSULES]; TAKE 1 CAPSULE BY MOUTH THREE TIMES DAILY AS NEEDED FOR HEADACHE  Dispense: 90 capsule; Refill: 0    2. Essential hypertension  - cloNIDine HCl (CATAPRES) 0.2 MG tablet; TAKE 1 TABLET(0.2 MG) BY MOUTH AT BEDTIME  Dispense: 90 tablet; Refill: 0    If protocol passes may refill for 12 months if within 3 months of last provider visit (or a total of 15 months).             Passed - Blood pressure filed in past 12 months     BP Readings from Last 1 Encounters:   03/26/19 124/70

## 2021-06-05 NOTE — TELEPHONE ENCOUNTER
RN cannot approve Refill Request    RN can NOT refill this medication med is not covered by policy/route to provider. Last office visit: 3/26/2019 Abby Joshi MD Last Physical: Visit date not found Last MTM visit: Visit date not found Last visit same specialty: 3/26/2019 Abby Joshi MD.  Next visit within 3 mo: Visit date not found  Next physical within 3 mo: Visit date not found      Linda Lock, [facility] Triage/Med Refill 1/7/2020    Requested Prescriptions   Pending Prescriptions Disp Refills     tiZANidine (ZANAFLEX) 4 MG tablet [Pharmacy Med Name: TIZANIDINE 4MG TABLETS] 60 tablet 0     Sig: TAKE 1 TABLET BY MOUTH TWICE DAILY AS NEEDED FOR HEADACHE OR MUSCLE SPASM       There is no refill protocol information for this order

## 2021-06-05 NOTE — TELEPHONE ENCOUNTER
Refill Approved    Rx renewed per Medication Renewal Policy. Medication was last renewed on 10/14/19.    Stella Paige, [facility] Triage/Med Refill 2/8/2020     Requested Prescriptions   Pending Prescriptions Disp Refills     potassium chloride (K-DUR,KLOR-CON) 10 MEQ tablet [Pharmacy Med Name: POTASSIUM CL MICRO 10MEQ ER TABS] 90 tablet 0     Sig: TAKE 1 TABLET BY MOUTH DAILY       Potassium Supplements Refill Protocol Passed - 2/8/2020  5:49 AM        Passed - PCP or prescribing provider visit in past 12 months       Last office visit with prescriber/PCP: 3/26/2019 Abby Joshi MD OR same dept: 3/26/2019 Abby Joshi MD OR same specialty: 3/26/2019 Abby Joshi MD  Last physical: Visit date not found Last MTM visit: Visit date not found   Next visit within 3 mo: Visit date not found  Next physical within 3 mo: Visit date not found  Prescriber OR PCP: Abby Joshi MD  Last diagnosis associated with med order: 1. Essential hypertension  - potassium chloride (K-DUR,KLOR-CON) 10 MEQ tablet [Pharmacy Med Name: POTASSIUM CL MICRO 10MEQ ER TABS]; TAKE 1 TABLET BY MOUTH DAILY  Dispense: 90 tablet; Refill: 0  - amLODIPine (NORVASC) 10 MG tablet [Pharmacy Med Name: AMLODIPINE BESYLATE 10MG TABLETS]; TAKE 1 TABLET(10 MG) BY MOUTH DAILY  Dispense: 90 tablet; Refill: 0    2. Major depressive disorder, single episode  - escitalopram oxalate (LEXAPRO) 20 MG tablet [Pharmacy Med Name: ESCITALOPRAM 20MG TABLETS]; TAKE 1 TABLET BY MOUTH DAILY  Dispense: 90 tablet; Refill: 0    If protocol passes may refill for 12 months if within 3 months of last provider visit (or a total of 15 months).             Passed - Potassium level in last 12 months     Lab Results   Component Value Date    Potassium 3.6 03/26/2019             escitalopram oxalate (LEXAPRO) 20 MG tablet [Pharmacy Med Name: ESCITALOPRAM 20MG TABLETS] 90 tablet 0     Sig: TAKE 1 TABLET BY MOUTH DAILY        [facility] Refill Protocol  Passed - 2/8/2020  5:49 AM        Passed - PCP or prescribing provider visit in last year     Last office visit with prescriber/PCP: 3/26/2019 Abby Joshi MD OR same dept: 3/26/2019 Abby Joshi MD OR same specialty: 3/26/2019 Abby Joshi MD  Last physical: Visit date not found Last MTM visit: Visit date not found   Next visit within 3 mo: Visit date not found  Next physical within 3 mo: Visit date not found  Prescriber OR PCP: Abby Joshi MD  Last diagnosis associated with med order: 1. Essential hypertension  - potassium chloride (K-DUR,KLOR-CON) 10 MEQ tablet [Pharmacy Med Name: POTASSIUM CL MICRO 10MEQ ER TABS]; TAKE 1 TABLET BY MOUTH DAILY  Dispense: 90 tablet; Refill: 0  - amLODIPine (NORVASC) 10 MG tablet [Pharmacy Med Name: AMLODIPINE BESYLATE 10MG TABLETS]; TAKE 1 TABLET(10 MG) BY MOUTH DAILY  Dispense: 90 tablet; Refill: 0    2. Major depressive disorder, single episode  - escitalopram oxalate (LEXAPRO) 20 MG tablet [Pharmacy Med Name: ESCITALOPRAM 20MG TABLETS]; TAKE 1 TABLET BY MOUTH DAILY  Dispense: 90 tablet; Refill: 0    If protocol passes may refill for 12 months if within 3 months of last provider visit (or a total of 15 months).             amLODIPine (NORVASC) 10 MG tablet [Pharmacy Med Name: AMLODIPINE BESYLATE 10MG TABLETS] 90 tablet 0     Sig: TAKE 1 TABLET(10 MG) BY MOUTH DAILY       Calcium-Channel Blockers Protocol Passed - 2/8/2020  5:49 AM        Passed - PCP or prescribing provider visit in past 12 months or next 3 months     Last office visit with prescriber/PCP: 3/26/2019 Abby Joshi MD OR same dept: 3/26/2019 Abby Joshi MD OR same specialty: 3/26/2019 Abby Joshi MD  Last physical: Visit date not found Last MTM visit: Visit date not found   Next visit within 3 mo: Visit date not found  Next physical within 3 mo: Visit date not found  Prescriber OR  PCP: Abby Joshi MD  Last diagnosis associated with med order: 1. Essential hypertension  - potassium chloride (K-DUR,KLOR-CON) 10 MEQ tablet [Pharmacy Med Name: POTASSIUM CL MICRO 10MEQ ER TABS]; TAKE 1 TABLET BY MOUTH DAILY  Dispense: 90 tablet; Refill: 0  - amLODIPine (NORVASC) 10 MG tablet [Pharmacy Med Name: AMLODIPINE BESYLATE 10MG TABLETS]; TAKE 1 TABLET(10 MG) BY MOUTH DAILY  Dispense: 90 tablet; Refill: 0    2. Major depressive disorder, single episode  - escitalopram oxalate (LEXAPRO) 20 MG tablet [Pharmacy Med Name: ESCITALOPRAM 20MG TABLETS]; TAKE 1 TABLET BY MOUTH DAILY  Dispense: 90 tablet; Refill: 0    If protocol passes may refill for 12 months if within 3 months of last provider visit (or a total of 15 months).             Passed - Blood pressure filed in past 12 months     BP Readings from Last 1 Encounters:   03/26/19 124/70

## 2021-06-07 NOTE — TELEPHONE ENCOUNTER
Notification from [employer] that amitriptyline 25mg is not covered but amoxapine 50mg or maprotiline 75mg covered.     I'll let you decide next best steps.      Don't see this on her list.

## 2021-06-07 NOTE — TELEPHONE ENCOUNTER
I discontinued Amitriptyline earlier this week and prescribed Amoxapine - not sure what the question is!

## 2021-06-07 NOTE — TELEPHONE ENCOUNTER
Who is calling:  Patient  Reason for Call:  Wants to request Dr Татьяна ramos for a fax from her [email or web address] insurance to get her prescription refills through their mail order service - it will greatly reduce patients cost of medications.  Date of last appointment with primary care: 3/26/19  Okay to leave a detailed message: Yes

## 2021-06-08 NOTE — TELEPHONE ENCOUNTER
Got a fax from [employer] today asking if you want diclofenac or indomethacin?      I'm not sure why I am getting these and will defer this decision to you.    Thank you!    Jaja

## 2021-06-08 NOTE — TELEPHONE ENCOUNTER
Patient Returning Call  Reason for call:  [employer] calling to request the following to be sent to her pharmacy.  diclofenac (VOLTAREN) 75 MG EC tablet (Discontinued)  180 tablet  1  4/2/2020 6/5/2020  No    Sig: TAKE 1 TABLET(75 MG) BY MOUTH TWICE DAILY AS NEEDED FOR HEADACHES    Sent to pharmacy as: diclofenac sodium 75 mg tablet,delayed release (VOLTAREN)    Reason for Discontinue: Alternate therapy    E-Prescribing Status: Receipt confirmed by pharmacy (4/2/2020  1:32 PM CDT)        Information relayed to patient:  Patient was advised this was d/fransico and indomethacin was ordered on 6/5/20.  Patient has additional questions:  No  If YES, what are your questions/concerns:  none  Okay to leave a detailed message?: Yes

## 2021-06-12 NOTE — TELEPHONE ENCOUNTER
RN cannot approve Refill Request    RN can NOT refill this medication med is not covered by policy/route to provider. Last office visit: Visit date not found Last Physical: Visit date not found Last MTM visit: Visit date not found Last visit same specialty: 3/26/2019 Abby Joshi MD.  Next visit within 3 mo: Visit date not found  Next physical within 3 mo: Visit date not found      Stella Paige, [facility] Triage/Med Refill 10/7/2020    Requested Prescriptions   Pending Prescriptions Disp Refills     indomethacin (INDOCIN) 25 MG capsule [Pharmacy Med Name: INDOMETHACIN 25 MG Capsule] 270 capsule 0     Sig: TAKE 1 CAPSULE BY MOUTH THREE TIMES DAILY AS NEEDED FOR HEADACHE       There is no refill protocol information for this order

## 2021-06-13 NOTE — TELEPHONE ENCOUNTER
Requested Prescriptions     Pending Prescriptions Disp Refills     potassium chloride (K-DUR,KLOR-CON) 10 MEQ tablet [Pharmacy Med Name: POTASSIUM CHLORIDE ER 10 MEQ Tablet Extended Release] 90 tablet 0     Sig: TAKE 1 TABLET (10 MEQ TOTAL) BY MOUTH DAILY.     amLODIPine (NORVASC) 10 MG tablet [Pharmacy Med Name: AMLODIPINE BESYLATE 10 MG Tablet] 90 tablet 0     Sig: TAKE 1 TABLET (10 MG TOTAL) BY MOUTH DAILY.     escitalopram oxalate (LEXAPRO) 20 MG tablet [Pharmacy Med Name: ESCITALOPRAM OXALATE 20 MG Tablet] 90 tablet 0     Sig: TAKE 1 TABLET (20 MG TOTAL) BY MOUTH DAILY.

## 2021-06-13 NOTE — TELEPHONE ENCOUNTER
Requested Prescriptions     Pending Prescriptions Disp Refills     indomethacin (INDOCIN) 25 MG capsule [Pharmacy Med Name: INDOMETHACIN 25 MG Capsule] 270 capsule 0     Sig: TAKE 1 CAPSULE  THREE TIMES DAILY AS NEEDED FOR HEADACHE

## 2021-06-14 NOTE — PROGRESS NOTES
ASSESSMENT/PLAN:  1. Essential hypertension  potassium chloride SA (K-DUR,KLOR-CON) 10 MEQ tablet    lisinopril (PRINIVIL,ZESTRIL) 20 MG tablet    cloNIDine HCl (CATAPRES) 0.2 MG tablet    atenolol (TENORMIN) 100 MG tablet    amLODIPine (NORVASC) 10 MG tablet    Comprehensive Metabolic Panel    Lipid Profile   2. Migraine  tiZANidine (ZANAFLEX) 4 MG tablet    SUMAtriptan (IMITREX) 50 MG tablet    diclofenac (VOLTAREN) 75 MG EC tablet    butalbital-acetaminophen-caffeine-codeine (FIORICET WITH CODEINE) -58-30 mg per capsule    amitriptyline (ELAVIL) 25 MG tablet   3. Hypothyroidism  levothyroxine (SYNTHROID, LEVOTHROID) 100 MCG tablet    Thyroid Stimulating Hormone (TSH)   4. Visit for screening mammogram     5. Major depressive disorder, single episode  escitalopram oxalate (LEXAPRO) 20 MG tablet       This is a 61 yo female with multiple underlying medical conditions - she has had limited medical care due to lack of insurance.  1.  Hypertension - takes multiple medications for blood pressure control.  She does have good BP control currently.  Continue same doses.  Check labs.  2.  Migraines - has taken multiple medications for headache control.  Mostly tension headaches I assume. They have improved since quitting work a couple years ago; however, current life stressors are probably just as much as work stressors.  Discussed.  3.  Hypothyroid - check labs - fill levothyroxine as indicated  4.  Major depression - magnified by life stressors.  Continue Escitalopram  5.  Needs screening mammogram - check mammogram.         Medications Discontinued During This Encounter   Medication Reason     cloNIDine HCl (CATAPRES) 0.2 MG tablet Duplicate order     tiZANidine (ZANAFLEX) 4 MG tablet Duplicate order     azithromycin (ZITHROMAX) 250 MG tablet Therapy completed     indomethacin (INDOCIN) 25 MG capsule Duplicate order     levothyroxine (SYNTHROID, LEVOTHROID) 100 MCG tablet Duplicate order     metoprolol succinate  "(TOPROL XL) 200 MG 24 hr tablet Therapy completed     FLUoxetine (PROZAC) 20 MG capsule Therapy completed     tiZANidine (ZANAFLEX) 4 MG tablet Reorder     SUMAtriptan (IMITREX) 50 MG tablet Reorder     potassium chloride SA (K-DUR,KLOR-CON) 10 MEQ tablet Reorder     lisinopril (PRINIVIL,ZESTRIL) 20 MG tablet Reorder     levothyroxine (SYNTHROID, LEVOTHROID) 100 MCG tablet Reorder     escitalopram oxalate (LEXAPRO) 20 MG tablet Reorder     diclofenac (VOLTAREN) 75 MG EC tablet Reorder     cloNIDine HCl (CATAPRES) 0.2 MG tablet Reorder     butalbital-acetaminophen-caffeine-codeine (FIORICET WITH CODEINE) -51-30 mg per capsule Reorder     atenolol (TENORMIN) 100 MG tablet Reorder     amLODIPine (NORVASC) 10 MG tablet Reorder     amitriptyline (ELAVIL) 25 MG tablet Reorder     There are no Patient Instructions on file for this visit.    Chief Complaint:  Chief Complaint   Patient presents with     Medication Refill       HPI:   Keyanna Palafox is a 62 y.o. female c/o  Spending a lot of time at daughter's house   is in wheelchair - can't be independent    Breathing - a little sore  \"tired\"    Headaches - \"bad\"      PMH:   Patient Active Problem List    Diagnosis Date Noted     Syncope 02/06/2015     Internal Derangement Of Left Knee      Viral Syndrome      Acute Bronchitis      Muscle Aches, Generalized (Myalgias)      Pain During Urination (Dysuria)      Pneumonia      Closed Fracture Of The Distal End Of The Radius      Acute Sinusitis      Allergies      Abnormal Weight Loss      Major depressive disorder, single episode      Hypothyroidism      Migraine Headache      Impingement Of The Right Shoulder      Hypertension      History reviewed. No pertinent past medical history.  History reviewed. No pertinent surgical history.  Social History     Social History     Marital status:      Spouse name: N/A     Number of children: N/A     Years of education: N/A     Occupational History     Not on " file.     Social History Main Topics     Smoking status: Current Every Day Smoker     Types: Cigarettes     Smokeless tobacco: Former User     Alcohol use Not on file     Drug use: Not on file     Sexual activity: Not on file     Other Topics Concern     Not on file     Social History Narrative       Meds:    Current Outpatient Prescriptions:      amitriptyline (ELAVIL) 25 MG tablet, TAKE 1 TABLET BY MOUTH EVERY NIGHT AT BEDTIME, Disp: 30 tablet, Rfl: 6     amLODIPine (NORVASC) 10 MG tablet, TAKE 1 TABLET BY MOUTH DAILY, Disp: 30 tablet, Rfl: 6     atenolol (TENORMIN) 100 MG tablet, TAKE 1 TABLET BY MOUTH EVERY DAY, Disp: 30 tablet, Rfl: 6     butalbital-acetaminophen-caffeine-codeine (FIORICET WITH CODEINE) -28-30 mg per capsule, TAKE 1 CAPSULE BY MOUTH FOUR TIMES DAILY AS NEEDED FOR HEADACHE, Disp: 180 capsule, Rfl: 0     cloNIDine HCl (CATAPRES) 0.2 MG tablet, Take 1 tablet (0.2 mg total) by mouth at bedtime., Disp: 30 tablet, Rfl: 6     diclofenac (VOLTAREN) 75 MG EC tablet, TAKE 1 TABLET(75 MG) BY MOUTH TWICE DAILY AS NEEDED FOR HEADACHES, Disp: 60 tablet, Rfl: 6     escitalopram oxalate (LEXAPRO) 20 MG tablet, TAKE 1 TABLET BY MOUTH DAILY, Disp: 30 tablet, Rfl: 6     indomethacin (INDOCIN) 25 MG capsule, TAKE 1 CAPSULE BY MOUTH THREE TIMES DAILY AS NEEDED FOR HEADACHE, Disp: 90 capsule, Rfl: 0     levothyroxine (SYNTHROID, LEVOTHROID) 100 MCG tablet, TAKE 1 TABLET BY MOUTH EVERY DAY, Disp: 30 tablet, Rfl: 6     lisinopril (PRINIVIL,ZESTRIL) 20 MG tablet, TAKE 1 TABLET BY MOUTH TWICE DAILY, Disp: 60 tablet, Rfl: 6     potassium chloride SA (K-DUR,KLOR-CON) 10 MEQ tablet, TAKE 1 TABLET BY MOUTH DAILY, Disp: 30 tablet, Rfl: 6     SUMAtriptan (IMITREX) 50 MG tablet, TAKE 1 TABLET BY MOUTH AT ONSET OF HEADACHE. MAY REPEAT IN 2 HOURS, Disp: 18 tablet, Rfl: 6     tiZANidine (ZANAFLEX) 4 MG tablet, TAKE 1 TABLET(4 MG) BY MOUTH TWICE DAILY AS NEEDED FOR HEADACHE OR MUSCLE SPASM, Disp: 60 tablet, Rfl:  6    Allergies:  Allergies   Allergen Reactions     Ace Inhibitors Cough     NOTE:  patient doesn't recall this reaction; she is currently on Lisinopril without problems!!       Ampicillin Itching     Sulfa (Sulfonamide Antibiotics) Nausea Only       ROS:  Pertinent positives as noted in HPI; otherwise 12 point ROS negative.      Physical Exam:  EXAM:  /68 (Patient Site: Right Arm, Patient Position: Sitting, Cuff Size: Adult Regular)  Pulse (!) 54  Temp 97  F (36.1  C) (Oral)   Resp 14  Wt 102 lb 11.2 oz (46.6 kg)  SpO2 97%  BMI 19.73 kg/m2   Gen:  NAD, appears well, well-hydrated  HEENT:  TMs nl, oropharynx benign, nasal mucosa nl, conjunctiva clear  Neck:  Supple, no adenopathy, no thyromegaly, no carotid bruits, no JVD  Lungs:  Clear to auscultation bilaterally  Cor:  RRR no murmur  Abd:  Soft, nontender, BS+, no masses, no guarding or rebound, no HSM  Extr:  Neg.  Neuro:  No asymmetry, Nl motor tone/strength, nl sensation, reflexes =, gait nl, nl coordination, CN intact,   Skin:  Warm/dry        Results:

## 2021-06-15 NOTE — TELEPHONE ENCOUNTER
RN cannot approve Refill Request    RN can NOT refill this medication med is not covered by policy/route to provider and Protocol failed and NO refill given. Last office visit: 3/26/2019 Abby Joshi MD Last Physical: Visit date not found Last MTM visit: Visit date not found Last visit same specialty: 3/26/2019 Abby Joshi MD.  Next visit within 3 mo: Visit date not found  Next physical within 3 mo: Visit date not found      Luis Enrique Perez, [facility] Connection Triage/Med Refill 2/23/2021    Requested Prescriptions   Pending Prescriptions Disp Refills     indomethacin (INDOCIN) 25 MG capsule [Pharmacy Med Name: INDOMETHACIN 25 MG Capsule] 270 capsule 0     Sig: TAKE 1 CAPSULE  THREE TIMES DAILY AS NEEDED FOR HEADACHE       There is no refill protocol information for this order        potassium chloride (K-DUR,KLOR-CON) 10 MEQ tablet [Pharmacy Med Name: POTASSIUM CHLORIDE ER 10 MEQ Tablet Extended Release] 90 tablet 0     Sig: TAKE 1 TABLET EVERY DAY       Potassium Supplements Refill Protocol Failed - 2/22/2021  4:58 PM        Failed - PCP or prescribing provider visit in past 12 months       Last office visit with prescriber/PCP: 3/26/2019 Abby Joshi MD OR same dept: Visit date not found OR same specialty: 3/26/2019 Abby Joshi MD  Last physical: Visit date not found Last MTM visit: Visit date not found   Next visit within 3 mo: Visit date not found  Next physical within 3 mo: Visit date not found  Prescriber OR PCP: Abby Joshi MD  Last diagnosis associated with med order: 1. Migraine  - indomethacin (INDOCIN) 25 MG capsule [Pharmacy Med Name: INDOMETHACIN 25 MG Capsule]; TAKE 1 CAPSULE  THREE TIMES DAILY AS NEEDED FOR HEADACHE  Dispense: 270 capsule; Refill: 0    2. Essential hypertension  - potassium chloride (K-DUR,KLOR-CON) 10 MEQ tablet [Pharmacy Med Name: POTASSIUM CHLORIDE ER 10 MEQ Tablet Extended Release]; TAKE 1 TABLET EVERY  DAY  Dispense: 90 tablet; Refill: 0  - amLODIPine (NORVASC) 10 MG tablet [Pharmacy Med Name: AMLODIPINE BESYLATE 10 MG Tablet]; TAKE 1 TABLET EVERY DAY  Dispense: 90 tablet; Refill: 0    3. Major depressive disorder, single episode  - escitalopram oxalate (LEXAPRO) 20 MG tablet [Pharmacy Med Name: ESCITALOPRAM OXALATE 20 MG Tablet]; TAKE 1 TABLET EVERY DAY  Dispense: 90 tablet; Refill: 0    If protocol passes may refill for 12 months if within 3 months of last provider visit (or a total of 15 months).             Failed - Potassium level in last 12 months     No results found for: LN-POTASSIUM             amLODIPine (NORVASC) 10 MG tablet [Pharmacy Med Name: AMLODIPINE BESYLATE 10 MG Tablet] 90 tablet 0     Sig: TAKE 1 TABLET EVERY DAY       Calcium-Channel Blockers Protocol Failed - 2/22/2021  4:58 PM        Failed - PCP or prescribing provider visit in past 12 months or next 3 months     Last office visit with prescriber/PCP: 3/26/2019 Abby Joshi MD OR same dept: Visit date not found OR same specialty: 3/26/2019 Abby Joshi MD  Last physical: Visit date not found Last MTM visit: Visit date not found   Next visit within 3 mo: Visit date not found  Next physical within 3 mo: Visit date not found  Prescriber OR PCP: Abby Joshi MD  Last diagnosis associated with med order: 1. Migraine  - indomethacin (INDOCIN) 25 MG capsule [Pharmacy Med Name: INDOMETHACIN 25 MG Capsule]; TAKE 1 CAPSULE  THREE TIMES DAILY AS NEEDED FOR HEADACHE  Dispense: 270 capsule; Refill: 0    2. Essential hypertension  - potassium chloride (K-DUR,KLOR-CON) 10 MEQ tablet [Pharmacy Med Name: POTASSIUM CHLORIDE ER 10 MEQ Tablet Extended Release]; TAKE 1 TABLET EVERY DAY  Dispense: 90 tablet; Refill: 0  - amLODIPine (NORVASC) 10 MG tablet [Pharmacy Med Name: AMLODIPINE BESYLATE 10 MG Tablet]; TAKE 1 TABLET EVERY DAY  Dispense: 90 tablet; Refill: 0    3. Major depressive disorder, single episode  -  escitalopram oxalate (LEXAPRO) 20 MG tablet [Pharmacy Med Name: ESCITALOPRAM OXALATE 20 MG Tablet]; TAKE 1 TABLET EVERY DAY  Dispense: 90 tablet; Refill: 0    If protocol passes may refill for 12 months if within 3 months of last provider visit (or a total of 15 months).             Failed - Blood pressure filed in past 12 months     BP Readings from Last 1 Encounters:   03/26/19 124/70                escitalopram oxalate (LEXAPRO) 20 MG tablet [Pharmacy Med Name: ESCITALOPRAM OXALATE 20 MG Tablet] 90 tablet 0     Sig: TAKE 1 TABLET EVERY DAY       SS[facility] Refill Protocol  Failed - 2/22/2021  4:58 PM        Failed - PCP or prescribing provider visit in last year     Last office visit with prescriber/PCP: 3/26/2019 Abby Joshi MD OR same dept: Visit date not found OR same specialty: 3/26/2019 Abby Joshi MD  Last physical: Visit date not found Last MTM visit: Visit date not found   Next visit within 3 mo: Visit date not found  Next physical within 3 mo: Visit date not found  Prescriber OR PCP: Abby Joshi MD  Last diagnosis associated with med order: 1. Migraine  - indomethacin (INDOCIN) 25 MG capsule [Pharmacy Med Name: INDOMETHACIN 25 MG Capsule]; TAKE 1 CAPSULE  THREE TIMES DAILY AS NEEDED FOR HEADACHE  Dispense: 270 capsule; Refill: 0    2. Essential hypertension  - potassium chloride (K-DUR,KLOR-CON) 10 MEQ tablet [Pharmacy Med Name: POTASSIUM CHLORIDE ER 10 MEQ Tablet Extended Release]; TAKE 1 TABLET EVERY DAY  Dispense: 90 tablet; Refill: 0  - amLODIPine (NORVASC) 10 MG tablet [Pharmacy Med Name: AMLODIPINE BESYLATE 10 MG Tablet]; TAKE 1 TABLET EVERY DAY  Dispense: 90 tablet; Refill: 0    3. Major depressive disorder, single episode  - escitalopram oxalate (LEXAPRO) 20 MG tablet [Pharmacy Med Name: ESCITALOPRAM OXALATE 20 MG Tablet]; TAKE 1 TABLET EVERY DAY  Dispense: 90 tablet; Refill: 0    If protocol passes may refill for 12 months if within 3 months of last  provider visit (or a total of 15 months).

## 2021-06-16 NOTE — TELEPHONE ENCOUNTER
RN cannot approve Refill Request    RN can NOT refill this medication PCP messaged that patient is overdue for Office Visit. Last office visit: Visit date not found Last Physical: Visit date not found Last MTM visit: Visit date not found Last visit same specialty: 3/26/2019 Abby Joshi MD.  Next visit within 3 mo: Visit date not found  Next physical within 3 mo: Visit date not found      Alycia Swenson, [facility] Connection Triage/Med Refill 4/8/2021    Requested Prescriptions   Pending Prescriptions Disp Refills     lisinopriL (PRINIVIL,ZESTRIL) 20 MG tablet [Pharmacy Med Name: LISINOPRIL 20 MG Tablet] 180 tablet 1     Sig: TAKE 1 TABLET TWICE DAILY       Ace Inhibitors Refill Protocol Failed - 4/7/2021  6:22 PM        Failed - PCP or prescribing provider visit in past 12 months       Last office visit with prescriber/PCP: Visit date not found OR same dept: Visit date not found OR same specialty: 3/26/2019 Abby Joshi MD  Last physical: Visit date not found Last MTM visit: Visit date not found   Next visit within 3 mo: Visit date not found  Next physical within 3 mo: Visit date not found  Prescriber OR PCP: Jaja Mendez MD  Last diagnosis associated with med order: 1. Essential hypertension  - lisinopriL (PRINIVIL,ZESTRIL) 20 MG tablet [Pharmacy Med Name: LISINOPRIL 20 MG Tablet]; TAKE 1 TABLET TWICE DAILY  Dispense: 180 tablet; Refill: 1    If protocol passes may refill for 12 months if within 3 months of last provider visit (or a total of 15 months).             Failed - Serum Potassium in past 12 months     No results found for: LN-POTASSIUM          Failed - Blood pressure filed in past 12 months     BP Readings from Last 1 Encounters:   03/26/19 124/70             Failed - Serum Creatinine in past 12 months     Creatinine   Date Value Ref Range Status   03/26/2019 0.65 0.60 - 1.10 mg/dL Final

## 2021-06-16 NOTE — TELEPHONE ENCOUNTER
Telephone Encounter by Mami Schroeder at 5/8/2019  8:36 AM     Author: Mami Schroeder Service: -- Author Type: --    Filed: 5/8/2019  8:38 AM Encounter Date: 5/3/2019 Status: Signed    : Mami Schroeder       PRIOR AUTHORIZATION DENIED    Denial Rational: PATIENT NEEDS TO TRY/FAIL 3 FORMULARY ALTERNATIVES: RIZATRIPTAN, IBUPROFEN, AND NAPROXEN        Appeal Information:

## 2021-06-19 ENCOUNTER — RECORDS - HEALTHEAST (OUTPATIENT)
Dept: FAMILY MEDICINE | Facility: CLINIC | Age: 66
End: 2021-06-19

## 2021-06-19 NOTE — LETTER
Letter by Abby Joshi MD at      Author: Abby Joshi MD Service: -- Author Type: --    Filed:  Encounter Date: 4/1/2019 Status: (Other)         Keyanna Palafox  [address] [place] [address]             April 1, 2019         Dear Ms. Palafox,    Below are the results from your recent visit:    Resulted Orders   Thyroid Stimulating Hormone (TSH)   Result Value Ref Range    TSH 0.06 (L) 0.30 - 5.00 uIU/mL   Comprehensive Metabolic Panel   Result Value Ref Range    Sodium 142 136 - 145 mmol/L    Potassium 3.6 3.5 - 5.0 mmol/L    Chloride 107 98 - 107 mmol/L    CO2 23 22 - 31 mmol/L    Anion Gap, Calculation 12 5 - 18 mmol/L    Glucose 108 70 - 125 mg/dL    BUN 14 8 - 22 mg/dL    Creatinine 0.65 0.60 - 1.10 mg/dL    GFR MDRD Af Amer >60 >60 mL/min/1.73m2    GFR MDRD Non Af Amer >60 >60 mL/min/1.73m2    Bilirubin, Total 0.2 0.0 - 1.0 mg/dL    Calcium 9.8 8.5 - 10.5 mg/dL    Protein, Total 7.3 6.0 - 8.0 g/dL    Albumin 4.1 3.5 - 5.0 g/dL    Alkaline Phosphatase 115 45 - 120 U/L    AST 14 0 - 40 U/L    ALT 14 0 - 45 U/L    Narrative    Fasting Glucose reference range is 70-99 mg/dL per  American Diabetes Association (ADA) guidelines.   Lipid Profile   Result Value Ref Range    Triglycerides 91 <=149 mg/dL    Cholesterol 196 <=199 mg/dL    LDL Calculated 106 <=129 mg/dL    HDL Cholesterol 72 >=50 mg/dL    Patient Fasting > 8hrs? No        Labs all look good  - except - your thyroid (TSH) numbers - It appears that your current dose is a little too high.  I will decrease this slightly.  Please check your pharmacy.     Please call with questions or contact us using [email or web address].    Sincerely,        Electronically signed by Abby Joshi MD

## 2021-06-19 NOTE — LETTER
Letter by Abby Joshi MD at      Author: Abby Joshi MD Service: -- Author Type: --    Filed:  Encounter Date: 7/12/2019 Status: (Other)           July 12, 2019        Keyanna Palafox  [address] [place] [address]        Dear Keyanna,    Breast cancer is the most common type of cancer among American women. The earlier breast cancer is detected, the better the survival rate. Your best defense against breast cancer is having a screening mammogram on a regular basis. The American Cancer Society recommends that women with an average risk of breast cancer should undergo regular screening mammography starting at age 45 years.         Women aged 45 to 54 years should have a mammogram annually.     Women 55 years and older should have a mammogram at least every other year.     There may be important reasons for you to follow a more frequent screening plan or an earlier start for breast cancer screening, so please discuss your health history and your family health history with your primary care provider.       We reviewed our records and we do not see that you had a mammogram within the past two years. If you have not had a mammogram in the past two years, we strongly encourage you to call for your appointment today. For your convenience, we have included a phone number below for you to schedule your mammogram at a nearby [facility] location.      If you have had a recent mammogram or have questions about why you are receiving this letter, please contact your primary care clinic at [phone] or send a [email or web address] message  ([email or web address]). Your clinic will answer any questions or help obtain recent mammogram reports for your chart at [facility] so we can keep record of your preventive care.       Sincerely,    Abby Joshi MD    and your primary care team at [facility]  Schedule your mammogram today at one of our 7  locations  Please call  [phone]

## 2021-06-23 NOTE — TELEPHONE ENCOUNTER
Controlled Substance Refill Request  Medication:   Requested Prescriptions     Pending Prescriptions Disp Refills     butalbital-acetaminophen-caffeine-codeine (FIORICET WITH CODEINE) -30-30 mg per capsule [Pharmacy Med Name: BUTAL/ACETA/CAFF/COD -79-30MG] 180 capsule 0     Sig: TAKE 1 CAPSULE BY MOUTH FOUR TIMES DAILY AS NEEDED FOR HEADACHE     tiZANidine (ZANAFLEX) 4 MG tablet [Pharmacy Med Name: TIZANIDINE 4MG TABLETS] 60 tablet 0     Sig: TAKE 1 TABLET BY MOUTH TWICE DAILY AS NEEDED FOR HEADACHE OR MUSCLE SPASM     amLODIPine (NORVASC) 10 MG tablet [Pharmacy Med Name: AMLODIPINE BESYLATE 10MG TABLETS] 90 tablet 0     Sig: TAKE 1 TABLET(10 MG) BY MOUTH DAILY     atenolol (TENORMIN) 100 MG tablet [Pharmacy Med Name: ATENOLOL 100MG TABLETS] 90 tablet 0     Sig: TAKE 1 TABLET(100 MG) BY MOUTH DAILY     Date Last Fill: 12/3/18  Pharmacy: [id number]   Submit electronically to pharmacy  Controlled Substance Agreement on File:   Encounter-Level CSA Scan Date:    There are no encounter-level csa scan date.       Last office visit: Last office visit pertaining to requested medication was 12/18/17.      Provider Refill Request  Medication:  tizanidine  RN can NOT refill this medication per RN refill protocol because med is not covered by policy/route to provider       Provider Refill Request  Medication:  amlodipine and atenolol  RN can NOT refill this medication per RN refill protocol because PCP messaged that patient is overdue for Labs and Office Visit     Stella Paige RN Care Connection Triage/Medication Refill

## 2021-06-23 NOTE — TELEPHONE ENCOUNTER
Controlled Substance Refill Request  Medication:   Requested Prescriptions     Pending Prescriptions Disp Refills     butalbital-acetaminophen-caffeine-codeine (FIORICET WITH CODEINE) -89-30 mg per capsule [Pharmacy Med Name: BUTAL/ACETA/CAFF/COD -28-30MG] 30 capsule 0     Sig: TAKE 1 CAPSULE BY MOUTH FOUR TIMES DAILY AS NEEDED FOR HEADACHE     SUMAtriptan (IMITREX) 50 MG tablet [Pharmacy Med Name: SUMATRIPTAN 50MG TABLETS] 18 tablet 0     Sig: TAKE 1 TABLET BY MOUTH AT ONSET OF HEADACHE. MAY REPEAT IN 2 HOURS     Date Last Fill: 3/26/18  Pharmacy: [facility] [address]  Submit electronically to pharmacy  Controlled Substance Agreement on File:   Encounter-Level CSA Scan Date:    There are no encounter-level csa scan date.       Last office visit: 12/18/2017 Abby Joshi MD

## 2021-06-23 NOTE — TELEPHONE ENCOUNTER
RN cannot approve Refill Request    RN can NOT refill this medication PCP messaged that patient is overdue for Office Visit.     Last office visit: 12/18/2017 Abby Joshi MD Last Physical: Visit date not found Last MTM visit: Visit date not found Last visit same specialty: 12/18/2017 Abby Joshi MD.  Next visit within 3 mo: Visit date not found  Next physical within 3 mo: Visit date not found      Laisha Corrales, Care Connection Triage/Med Refill 1/23/2019    Requested Prescriptions   Pending Prescriptions Disp Refills     butalbital-acetaminophen-caffeine-codeine (FIORICET WITH CODEINE) -17-30 mg per capsule [Pharmacy Med Name: BUTAL/ACETA/CAFF/COD -11-30MG] 30 capsule 0     Sig: TAKE 1 CAPSULE BY MOUTH FOUR TIMES DAILY AS NEEDED FOR HEADACHE    Controlled Substances Refill Protocol Failed - 1/21/2019 10:46 AM       Failed - Route all Controlled Substance Requests to Provider       Failed - Patient has controlled substance agreement in past 12 months    Encounter-Level CSA Scan Date:    There are no encounter-level csa scan date.              Failed - Visit with PCP or prescribing provider visit in past 12 months     Last office visit with prescriber/PCP: 12/18/2017 Abby Joshi MD OR same dept: Visit date not found OR same specialty: 12/18/2017 Abby Joshi MD Last physical: Visit date not found Last MTM visit: Visit date not found    Next visit within 3 mo: Visit date not found  Next physical within 3 mo: Visit date not found  Prescriber OR PCP: Abby Joshi MD  Last diagnosis associated with med order: 1. Migraine  - butalbital-acetaminophen-caffeine-codeine (FIORICET WITH CODEINE) -85-30 mg per capsule [Pharmacy Med Name: BUTAL/ACETA/CAFF/COD -77-30MG]; TAKE 1 CAPSULE BY MOUTH FOUR TIMES DAILY AS NEEDED FOR HEADACHE  Dispense: 30 capsule; Refill: 0  - SUMAtriptan (IMITREX) 50 MG tablet [Pharmacy Med Name:  SUMATRIPTAN 50MG TABLETS]; TAKE 1 TABLET BY MOUTH AT ONSET OF HEADACHE. MAY REPEAT IN 2 HOURS  Dispense: 18 tablet; Refill: 0               SUMAtriptan (IMITREX) 50 MG tablet [Pharmacy Med Name: SUMATRIPTAN 50MG TABLETS] 18 tablet 0     Sig: TAKE 1 TABLET BY MOUTH AT ONSET OF HEADACHE. MAY REPEAT IN 2 HOURS    Triptans Refill Protocol Failed - 1/21/2019 10:46 AM       Failed - PCP or prescribing provider visit in past 12 months      Last office visit with prescriber/PCP: 12/18/2017 Abby Joshi MD OR same dept: Visit date not found OR same specialty: 12/18/2017 Abby Joshi MD  Last physical: Visit date not found Last MTM visit: Visit date not found   Next visit within 3 mo: Visit date not found  Next physical within 3 mo: Visit date not found  Prescriber OR PCP: Abby Joshi MD  Last diagnosis associated with med order: 1. Migraine  - butalbital-acetaminophen-caffeine-codeine (FIORICET WITH CODEINE) -74-30 mg per capsule [Pharmacy Med Name: BUTAL/ACETA/CAFF/COD -45-30MG]; TAKE 1 CAPSULE BY MOUTH FOUR TIMES DAILY AS NEEDED FOR HEADACHE  Dispense: 30 capsule; Refill: 0  - SUMAtriptan (IMITREX) 50 MG tablet [Pharmacy Med Name: SUMATRIPTAN 50MG TABLETS]; TAKE 1 TABLET BY MOUTH AT ONSET OF HEADACHE. MAY REPEAT IN 2 HOURS  Dispense: 18 tablet; Refill: 0    If protocol passes may refill for 12 months if within 3 months of last provider visit (or a total of 15 months).

## 2021-06-24 NOTE — TELEPHONE ENCOUNTER
RN cannot approve Refill Request    RN can NOT refill this medication med is not covered by policy/route to provider. Last office visit: 12/18/2017 Abby Joshi MD Last Physical: Visit date not found Last MTM visit: Visit date not found Last visit same specialty: 12/18/2017 Abby Joshi MD.  Next visit within 3 mo: Visit date not found  Next physical within 3 mo: Visit date not found      Rosette Gatica, [facility] Connection Triage/Med Refill 3/9/2019    Requested Prescriptions   Pending Prescriptions Disp Refills     tiZANidine (ZANAFLEX) 4 MG tablet [Pharmacy Med Name: TIZANIDINE 4MG TABLETS] 60 tablet 0     Sig: TAKE 1 TABLET BY MOUTH TWICE DAILY AS NEEDED FOR HEADACHE OR MUSCLE SPASM    There is no refill protocol information for this order

## 2021-06-24 NOTE — TELEPHONE ENCOUNTER
Spoke with patient and informed patient to need to be seen for further refills. Patient states she don't have insurance at this time and wants to know if PCP can refill without patient being seen. Patient also added that she need refills on her:    Amlodiphine 10mg tablets  Levothyroxine 100 MCG Tablets  Lisinopril 20 mg tablets    Please call patient back at [phone] to inform. Ok to leave a detailed message.

## 2021-06-24 NOTE — TELEPHONE ENCOUNTER
Called to inform patient. Per Patient she has no insurance. In that case she will go without her medications.

## 2021-06-24 NOTE — TELEPHONE ENCOUNTER
Controlled Substance Refill Request  Medication:   Requested Prescriptions     Pending Prescriptions Disp Refills     butalbital-acetaminophen-caffeine-codeine (FIORICET WITH CODEINE) -32-30 mg per capsule [Pharmacy Med Name: BUTAL/ACETA/CAFF/COD -40-30MG] 30 capsule 0     Sig: TAKE 1 CAPSULE BY MOUTH FOUR TIMES DAILY AS NEEDED FOR HEADACHE     Date Last Fill: 2/1/2019  Pharmacy: [facility] [address]   Submit electronically to pharmacy  Controlled Substance Agreement on File:   Encounter-Level CSA Scan Date:    There are no encounter-level csa scan date.       Last office visit:12/18/2017.  Last office visit pertaining to requested medication was 12/18/2017 with PCP Dr KESHIA Joshi

## 2021-06-25 NOTE — TELEPHONE ENCOUNTER
Who is calling:  Patient   Reason for Call:  Questioning if provider can send a sufficient quantity of medication to hold her over until her appointment scheduled on 03/26/2019.  Date of last appointment with primary care: 12/18/2017  Okay to leave a detailed message: Yes

## 2021-06-25 NOTE — TELEPHONE ENCOUNTER
RN cannot approve Refill Request    RN can NOT refill this medication PCP messaged that patient is overdue for Labs and Office Visit. Last office visit: Visit date not found Last Physical: Visit date not found Last MTM visit: Visit date not found Last visit same specialty: 3/26/2019 Abby Joshi MD.  Next visit within 3 mo: Visit date not found  Next physical within 3 mo: Visit date not found      Alycia Swenson, [facility] Triage/Med Refill 5/28/2021    Requested Prescriptions   Pending Prescriptions Disp Refills     levothyroxine (SYNTHROID, LEVOTHROID) 88 MCG tablet [Pharmacy Med Name: LEVOTHYROXINE SODIUM 88 MCG Tablet] 90 tablet 1     Sig: TAKE 1 TABLET (88 MCG TOTAL) DAILY.       Thyroid Hormones Protocol Failed - 5/28/2021  3:39 PM        Failed - Provider visit in past 12 months or next 3 months     Last office visit with prescriber/PCP: Visit date not found OR same dept: Visit date not found OR same specialty: 3/26/2019 Abby Joshi MD  Last physical: Visit date not found Last MTM visit: Visit date not found   Next visit within 3 mo: Visit date not found  Next physical within 3 mo: Visit date not found  Prescriber OR PCP: Jaja Mendez MD  Last diagnosis associated with med order: 1. Hypothyroidism  - levothyroxine (SYNTHROID, LEVOTHROID) 88 MCG tablet [Pharmacy Med Name: LEVOTHYROXINE SODIUM 88 MCG Tablet]; TAKE 1 TABLET (88 MCG TOTAL) DAILY.  Dispense: 90 tablet; Refill: 1    2. Essential hypertension  - cloNIDine HCL (CATAPRES) 0.2 MG tablet [Pharmacy Med Name: CLONIDINE HYDROCHLORIDE 0.2 MG Tablet]; TAKE 1 TABLET AT BEDTIME  Dispense: 90 tablet; Refill: 1  - atenoloL (TENORMIN) 100 MG tablet [Pharmacy Med Name: ATENOLOL 100 MG Tablet]; TAKE 1 TABLET EVERY DAY  Dispense: 90 tablet; Refill: 1    3. Migraine  - tiZANidine (ZANAFLEX) 4 MG tablet [Pharmacy Med Name: TIZANIDINE HYDROCHLORIDE 4 MG Tablet]; TAKE 1 TABLET TWICE DAILY AS NEEDED FOR HEADACHE OR MUSCLE  SPASM  Dispense: 180 tablet; Refill: 1    If protocol passes may refill for 12 months if within 3 months of last provider visit (or a total of 15 months).             Failed - TSH on file in past 12 months for patient age 12 & older     TSH   Date Value Ref Range Status   03/26/2019 0.06 (L) 0.30 - 5.00 uIU/mL Final                      cloNIDine HCL (CATAPRES) 0.2 MG tablet [Pharmacy Med Name: CLONIDINE HYDROCHLORIDE 0.2 MG Tablet] 90 tablet 1     Sig: TAKE 1 TABLET AT BEDTIME       Clonidine/Hydralazine Refill Protocol Failed - 5/28/2021  3:39 PM        Failed - PCP or prescribing provider visit in past 12 months       Last office visit with prescriber/PCP: Visit date not found OR same dept: Visit date not found OR same specialty: 3/26/2019 Abby Joshi MD  Last physical: Visit date not found Last MTM visit: Visit date not found   Next visit within 3 mo: Visit date not found  Next physical within 3 mo: Visit date not found  Prescriber OR PCP: Jaja Mendez MD  Last diagnosis associated with med order: 1. Hypothyroidism  - levothyroxine (SYNTHROID, LEVOTHROID) 88 MCG tablet [Pharmacy Med Name: LEVOTHYROXINE SODIUM 88 MCG Tablet]; TAKE 1 TABLET (88 MCG TOTAL) DAILY.  Dispense: 90 tablet; Refill: 1    2. Essential hypertension  - cloNIDine HCL (CATAPRES) 0.2 MG tablet [Pharmacy Med Name: CLONIDINE HYDROCHLORIDE 0.2 MG Tablet]; TAKE 1 TABLET AT BEDTIME  Dispense: 90 tablet; Refill: 1  - atenoloL (TENORMIN) 100 MG tablet [Pharmacy Med Name: ATENOLOL 100 MG Tablet]; TAKE 1 TABLET EVERY DAY  Dispense: 90 tablet; Refill: 1    3. Migraine  - tiZANidine (ZANAFLEX) 4 MG tablet [Pharmacy Med Name: TIZANIDINE HYDROCHLORIDE 4 MG Tablet]; TAKE 1 TABLET TWICE DAILY AS NEEDED FOR HEADACHE OR MUSCLE SPASM  Dispense: 180 tablet; Refill: 1    If protocol passes may refill for 12 months if within 3 months of last provider visit (or a total of 15 months).             Failed - Blood pressure filed in past 12 months      BP Readings from Last 1 Encounters:   03/26/19 124/70                atenoloL (TENORMIN) 100 MG tablet [Pharmacy Med Name: ATENOLOL 100 MG Tablet] 90 tablet 1     Sig: TAKE 1 TABLET EVERY DAY       Beta-Blockers Refill Protocol Failed - 5/28/2021  3:39 PM        Failed - PCP or prescribing provider visit in past 12 months or next 3 months     Last office visit with prescriber/PCP: Visit date not found OR same dept: Visit date not found OR same specialty: 3/26/2019 Abby Joshi MD  Last physical: Visit date not found Last MTM visit: Visit date not found   Next visit within 3 mo: Visit date not found  Next physical within 3 mo: Visit date not found  Prescriber OR PCP: Jaja Mendez MD  Last diagnosis associated with med order: 1. Hypothyroidism  - levothyroxine (SYNTHROID, LEVOTHROID) 88 MCG tablet [Pharmacy Med Name: LEVOTHYROXINE SODIUM 88 MCG Tablet]; TAKE 1 TABLET (88 MCG TOTAL) DAILY.  Dispense: 90 tablet; Refill: 1    2. Essential hypertension  - cloNIDine HCL (CATAPRES) 0.2 MG tablet [Pharmacy Med Name: CLONIDINE HYDROCHLORIDE 0.2 MG Tablet]; TAKE 1 TABLET AT BEDTIME  Dispense: 90 tablet; Refill: 1  - atenoloL (TENORMIN) 100 MG tablet [Pharmacy Med Name: ATENOLOL 100 MG Tablet]; TAKE 1 TABLET EVERY DAY  Dispense: 90 tablet; Refill: 1    3. Migraine  - tiZANidine (ZANAFLEX) 4 MG tablet [Pharmacy Med Name: TIZANIDINE HYDROCHLORIDE 4 MG Tablet]; TAKE 1 TABLET TWICE DAILY AS NEEDED FOR HEADACHE OR MUSCLE SPASM  Dispense: 180 tablet; Refill: 1    If protocol passes may refill for 12 months if within 3 months of last provider visit (or a total of 15 months).             Failed - Blood pressure filed in past 12 months     BP Readings from Last 1 Encounters:   03/26/19 124/70                tiZANidine (ZANAFLEX) 4 MG tablet [Pharmacy Med Name: TIZANIDINE HYDROCHLORIDE 4 MG Tablet] 180 tablet 1     Sig: TAKE 1 TABLET TWICE DAILY AS NEEDED FOR HEADACHE OR MUSCLE SPASM       There is no refill protocol  information for this order

## 2021-06-26 NOTE — TELEPHONE ENCOUNTER
RN cannot approve Refill Request    RN can NOT refill this medication PCP messaged that patient is overdue for Office Visit. Last office visit: 3/26/2019 Abby Joshi MD Last Physical: Visit date not found Last MTM visit: Visit date not found Last visit same specialty: 3/26/2019 Abby Joshi MD.  Next visit within 3 mo: Visit date not found  Next physical within 3 mo: Visit date not found      Saundra Mathew, [facility] Triage/Med Refill 6/19/2021    Requested Prescriptions   Pending Prescriptions Disp Refills     lisinopriL (PRINIVIL,ZESTRIL) 20 MG tablet [Pharmacy Med Name: LISINOPRIL 20 MG Tablet] 60 tablet 0     Sig: TAKE 1 TABLET TWICE DAILY (NEED VISIT PRIOR TO FUTURE REFILLS)       Ace Inhibitors Refill Protocol Failed - 6/19/2021  1:36 PM        Failed - PCP or prescribing provider visit in past 12 months       Last office visit with prescriber/PCP: 3/26/2019 Abby Joshi MD OR same dept: Visit date not found OR same specialty: 3/26/2019 Abby Joshi MD  Last physical: Visit date not found Last MTM visit: Visit date not found   Next visit within 3 mo: Visit date not found  Next physical within 3 mo: Visit date not found  Prescriber OR PCP: Abby Joshi MD  Last diagnosis associated with med order: 1. Essential hypertension  - lisinopriL (PRINIVIL,ZESTRIL) 20 MG tablet [Pharmacy Med Name: LISINOPRIL 20 MG Tablet]; TAKE 1 TABLET TWICE DAILY (NEED VISIT PRIOR TO FUTURE REFILLS)  Dispense: 60 tablet; Refill: 0    If protocol passes may refill for 12 months if within 3 months of last provider visit (or a total of 15 months).             Failed - Serum Potassium in past 12 months     No results found for: LN-POTASSIUM          Failed - Blood pressure filed in past 12 months     BP Readings from Last 1 Encounters:   03/26/19 124/70             Failed - Serum Creatinine in past 12 months     Creatinine   Date Value Ref Range  Status   03/26/2019 0.65 0.60 - 1.10 mg/dL Final

## 2021-07-03 NOTE — ADDENDUM NOTE
Addendum Note by Juan Mendez MD at 6/5/2020  5:04 PM     Author: Juan Mendez MD Service: -- Author Type: Physician    Filed: 6/5/2020  5:04 PM Encounter Date: 6/5/2020 Status: Signed    : Juan Mendez MD (Physician)    Addended by: JUAN MENDEZ on: 6/5/2020 05:04 PM        Modules accepted: Orders

## 2021-09-23 DIAGNOSIS — I10 ESSENTIAL HYPERTENSION: ICD-10-CM

## 2021-09-23 NOTE — TELEPHONE ENCOUNTER
Reason for Call:  Medication or medication refill:    Do you use a [facility]?  Name of the pharmacy and phone number for the current request:  [facility] on file    Name of the medication requested: lisinopriL (PRINIVIL,ZESTRIL) 20 MG tablet    Other request: Patient called to request a refill of this medication. Please send refill to pharmacy on file.    Can we leave a detailed message on this number? YES    Phone number patient can be reached at: Home number on file [phone] (home)    Best Time: any    Call taken on 9/23/2021 at 10:11 AM by Violet Au

## 2021-09-23 NOTE — TELEPHONE ENCOUNTER
"Routing refill request to provider for review/approval because:  Labs not current:  Cr, K+, BP  Patient needs to be seen because it has been more than 1 year since last office visit.    Last Written Prescription Date:  4/9/21  Last Fill Quantity: 60,  # refills: 0   Last office visit provider:  3/26/19     Requested Prescriptions   Pending Prescriptions Disp Refills     lisinopril (ZESTRIL) 20 MG tablet 60 tablet 0       ACE Inhibitors (Including Combos) Protocol Failed - 9/23/2021 10:13 AM        Failed - Blood pressure under 140/90 in past 12 months     BP Readings from Last 3 Encounters:   No data found for BP                 Failed - Recent (12 mo) or future (30 days) visit within the authorizing provider's specialty     Patient has had an office visit with the authorizing provider or a provider within the authorizing providers department within the previous 12 mos or has a future within next 30 days. See \"Patient Info\" tab in inbasket, or \"Choose Columns\" in Meds & Orders section of the refill encounter.              Failed - Normal serum creatinine on file in past 12 months     Recent Labs   Lab Test 03/26/19  1600   CR 0.65       Ok to refill medication if creatinine is low          Failed - Normal serum potassium on file in past 12 months     Recent Labs   Lab Test 03/26/19  1600   POTASSIUM 3.6             Passed - Medication is active on med list        Passed - Patient is age 18 or older        Passed - No active pregnancy on record        Passed - No positive pregnancy test within past 12 months             zaheer joyce RN 09/23/21 6:51 PM  "

## 2021-09-24 RX ORDER — LISINOPRIL 20 MG/1
TABLET ORAL
Qty: 60 TABLET | Refills: 0 | OUTPATIENT
Start: 2021-09-24

## 2021-09-28 DIAGNOSIS — I10 ESSENTIAL HYPERTENSION: ICD-10-CM

## 2021-09-28 RX ORDER — LISINOPRIL 20 MG/1
TABLET ORAL
Qty: 60 TABLET | Refills: 3 | Status: SHIPPED | OUTPATIENT
Start: 2021-09-28 | End: 2022-01-28

## 2021-09-28 NOTE — TELEPHONE ENCOUNTER
I have written a prescription with refills to last until January.  If she doesn't change insurances, I CAN'T keep filling this medication.

## 2021-09-28 NOTE — TELEPHONE ENCOUNTER
Patient is calling back to say she has [employer] and cant come in the clinic until January 2021 when she is allowed to change insurances?? She aslo said she only has 2 pills remaining

## 2021-09-29 RX ORDER — LISINOPRIL 20 MG/1
TABLET ORAL
Qty: 180 TABLET | OUTPATIENT
Start: 2021-09-29

## 2021-09-30 NOTE — TELEPHONE ENCOUNTER
Refill request already responded to. Lisinopril 20 mg tablets filled on 9/28/2021 for 60 tablets and 3 refills.     Shayy Whitfield, RN/M [facility]

## 2022-01-28 ENCOUNTER — OFFICE VISIT (OUTPATIENT)
Dept: FAMILY MEDICINE | Facility: CLINIC | Age: 67
End: 2022-01-28
Payer: COMMERCIAL

## 2022-01-28 VITALS
HEART RATE: 61 BPM | BODY MASS INDEX: 22.75 KG/M2 | WEIGHT: 116.5 LBS | SYSTOLIC BLOOD PRESSURE: 169 MMHG | DIASTOLIC BLOOD PRESSURE: 95 MMHG | RESPIRATION RATE: 16 BRPM

## 2022-01-28 DIAGNOSIS — R22.0 SWELLING, MASS, OR LUMP ON FACE: ICD-10-CM

## 2022-01-28 DIAGNOSIS — I10 ESSENTIAL HYPERTENSION: Primary | ICD-10-CM

## 2022-01-28 DIAGNOSIS — G43.709 CHRONIC MIGRAINE WITHOUT AURA WITHOUT STATUS MIGRAINOSUS, NOT INTRACTABLE: ICD-10-CM

## 2022-01-28 DIAGNOSIS — E03.9 HYPOTHYROIDISM, UNSPECIFIED TYPE: ICD-10-CM

## 2022-01-28 DIAGNOSIS — F33.1 MODERATE EPISODE OF RECURRENT MAJOR DEPRESSIVE DISORDER (H): ICD-10-CM

## 2022-01-28 LAB
ALBUMIN SERPL-MCNC: 3.6 G/DL (ref 3.5–5)
ALP SERPL-CCNC: 106 U/L (ref 45–120)
ALT SERPL W P-5'-P-CCNC: <9 U/L (ref 0–45)
ANION GAP SERPL CALCULATED.3IONS-SCNC: 12 MMOL/L (ref 5–18)
AST SERPL W P-5'-P-CCNC: 9 U/L (ref 0–40)
BILIRUB SERPL-MCNC: 0.3 MG/DL (ref 0–1)
BUN SERPL-MCNC: 7 MG/DL (ref 8–22)
CALCIUM SERPL-MCNC: 9.5 MG/DL (ref 8.5–10.5)
CHLORIDE BLD-SCNC: 109 MMOL/L (ref 98–107)
CHOLEST SERPL-MCNC: 183 MG/DL
CO2 SERPL-SCNC: 22 MMOL/L (ref 22–31)
CREAT SERPL-MCNC: 0.72 MG/DL (ref 0.6–1.1)
ERYTHROCYTE [DISTWIDTH] IN BLOOD BY AUTOMATED COUNT: 14.4 % (ref 10–15)
FASTING STATUS PATIENT QL REPORTED: NO
GFR SERPL CREATININE-BSD FRML MDRD: >90 ML/MIN/1.73M2
GLUCOSE BLD-MCNC: 93 MG/DL (ref 70–125)
HCT VFR BLD AUTO: 43.2 % (ref 35–47)
HDLC SERPL-MCNC: 42 MG/DL
HGB BLD-MCNC: 14 G/DL (ref 11.7–15.7)
LDLC SERPL CALC-MCNC: 116 MG/DL
MCH RBC QN AUTO: 31.1 PG (ref 26.5–33)
MCHC RBC AUTO-ENTMCNC: 32.4 G/DL (ref 31.5–36.5)
MCV RBC AUTO: 96 FL (ref 78–100)
PLATELET # BLD AUTO: 259 10E3/UL (ref 150–450)
POTASSIUM BLD-SCNC: 3.1 MMOL/L (ref 3.5–5)
PROT SERPL-MCNC: 7.2 G/DL (ref 6–8)
RBC # BLD AUTO: 4.5 10E6/UL (ref 3.8–5.2)
SODIUM SERPL-SCNC: 143 MMOL/L (ref 136–145)
TRIGL SERPL-MCNC: 125 MG/DL
TSH SERPL DL<=0.005 MIU/L-ACNC: 1.15 UIU/ML (ref 0.3–5)
WBC # BLD AUTO: 6.9 10E3/UL (ref 4–11)

## 2022-01-28 PROCEDURE — 99214 OFFICE O/P EST MOD 30 MIN: CPT | Performed by: FAMILY MEDICINE

## 2022-01-28 PROCEDURE — 80053 COMPREHEN METABOLIC PANEL: CPT | Performed by: FAMILY MEDICINE

## 2022-01-28 PROCEDURE — 0054A COVID-19,PF,PFIZER (12+ YRS): CPT | Performed by: FAMILY MEDICINE

## 2022-01-28 PROCEDURE — 80061 LIPID PANEL: CPT | Performed by: FAMILY MEDICINE

## 2022-01-28 PROCEDURE — 91305 COVID-19,PF,PFIZER (12+ YRS): CPT | Performed by: FAMILY MEDICINE

## 2022-01-28 PROCEDURE — 36415 COLL VENOUS BLD VENIPUNCTURE: CPT | Performed by: FAMILY MEDICINE

## 2022-01-28 PROCEDURE — 84443 ASSAY THYROID STIM HORMONE: CPT | Performed by: FAMILY MEDICINE

## 2022-01-28 PROCEDURE — 85027 COMPLETE CBC AUTOMATED: CPT | Performed by: FAMILY MEDICINE

## 2022-01-28 RX ORDER — CLONIDINE HYDROCHLORIDE 0.2 MG/1
0.2 TABLET ORAL DAILY
Qty: 90 TABLET | Refills: 1 | Status: SHIPPED | OUTPATIENT
Start: 2022-01-28 | End: 2022-05-16

## 2022-01-28 RX ORDER — AMLODIPINE BESYLATE 10 MG/1
10 TABLET ORAL DAILY
Qty: 90 TABLET | Refills: 1 | Status: SHIPPED | OUTPATIENT
Start: 2022-01-28 | End: 2022-05-16

## 2022-01-28 RX ORDER — INDOMETHACIN 25 MG/1
CAPSULE ORAL
Qty: 270 CAPSULE | Refills: 1 | Status: SHIPPED | OUTPATIENT
Start: 2022-01-28 | End: 2022-05-16

## 2022-01-28 RX ORDER — ATENOLOL 100 MG/1
100 TABLET ORAL DAILY
Qty: 90 TABLET | Refills: 1 | Status: SHIPPED | OUTPATIENT
Start: 2022-01-28 | End: 2022-05-16

## 2022-01-28 RX ORDER — CEPHALEXIN 500 MG/1
500 CAPSULE ORAL 3 TIMES DAILY
Qty: 30 CAPSULE | Refills: 0 | Status: SHIPPED | OUTPATIENT
Start: 2022-01-28 | End: 2022-02-07

## 2022-01-28 RX ORDER — LEVOTHYROXINE SODIUM 112 UG/1
112 TABLET ORAL DAILY
Qty: 90 TABLET | Refills: 1 | Status: SHIPPED | OUTPATIENT
Start: 2022-01-28 | End: 2022-05-16

## 2022-01-28 RX ORDER — AMOXAPINE 50 MG/1
50 TABLET ORAL AT BEDTIME
Qty: 90 TABLET | Refills: 1 | Status: SHIPPED | OUTPATIENT
Start: 2022-01-28 | End: 2022-05-16

## 2022-01-28 RX ORDER — POTASSIUM CHLORIDE 750 MG/1
10 TABLET, EXTENDED RELEASE ORAL DAILY
Qty: 90 TABLET | Refills: 1 | Status: SHIPPED | OUTPATIENT
Start: 2022-01-28 | End: 2022-05-16

## 2022-01-28 RX ORDER — ESCITALOPRAM OXALATE 20 MG/1
20 TABLET ORAL DAILY
Qty: 90 TABLET | Refills: 1 | Status: SHIPPED | OUTPATIENT
Start: 2022-01-28 | End: 2022-05-16

## 2022-01-28 RX ORDER — LISINOPRIL 20 MG/1
TABLET ORAL
Qty: 60 TABLET | Refills: 3 | Status: SHIPPED | OUTPATIENT
Start: 2022-01-28 | End: 2022-04-14

## 2022-01-28 ASSESSMENT — ENCOUNTER SYMPTOMS
FEVER: 0
CHILLS: 0
CHEST TIGHTNESS: 0
SHORTNESS OF BREATH: 0
ABDOMINAL PAIN: 0
COUGH: 0
SINUS PAIN: 0
FATIGUE: 1
ACTIVITY CHANGE: 1
PALPITATIONS: 0
HEADACHES: 1
UNEXPECTED WEIGHT CHANGE: 0

## 2022-01-28 ASSESSMENT — PATIENT HEALTH QUESTIONNAIRE - PHQ9: SUM OF ALL RESPONSES TO PHQ QUESTIONS 1-9: 0

## 2022-01-28 NOTE — LETTER
January 28, 2022      Keyanna Palafox  [address] [place] [address]        Dear ,    We are writing to inform you of your test results.    Your labs are actually more normal than I anticipated!  Take your meds and recheck in 2 months.     Resulted Orders   Comprehensive metabolic panel (BMP + Alb, Alk Phos, ALT, AST, Total. Bili, TP)   Result Value Ref Range    Sodium 143 136 - 145 mmol/L    Potassium 3.1 (L) 3.5 - 5.0 mmol/L    Chloride 109 (H) 98 - 107 mmol/L    Carbon Dioxide (CO2) 22 22 - 31 mmol/L    Anion Gap 12 5 - 18 mmol/L    Urea Nitrogen 7 (L) 8 - 22 mg/dL    Creatinine 0.72 0.60 - 1.10 mg/dL    Calcium 9.5 8.5 - 10.5 mg/dL    Glucose 93 70 - 125 mg/dL    Alkaline Phosphatase 106 45 - 120 U/L    AST 9 0 - 40 U/L    ALT <9 0 - 45 U/L    Protein Total 7.2 6.0 - 8.0 g/dL    Albumin 3.6 3.5 - 5.0 g/dL    Bilirubin Total 0.3 0.0 - 1.0 mg/dL    GFR Estimate >90 >60 mL/min/1.73m2      Comment:      Effective December 21, 2021 eGFRcr in adults is calculated using the 2021 CKD-EPI creatinine equation which includes age and gender (Cesar et al., [facility], DOI: 10.1056/LRRRwy8959586)   CBC with platelets   Result Value Ref Range    WBC Count 6.9 4.0 - 11.0 10e3/uL    RBC Count 4.50 3.80 - 5.20 10e6/uL    Hemoglobin 14.0 11.7 - 15.7 g/dL    Hematocrit 43.2 35.0 - 47.0 %    MCV 96 78 - 100 fL    MCH 31.1 26.5 - 33.0 pg    MCHC 32.4 31.5 - 36.5 g/dL    RDW 14.4 10.0 - 15.0 %    Platelet Count 259 150 - 450 10e3/uL   TSH with free T4 reflex   Result Value Ref Range    TSH 1.15 0.30 - 5.00 uIU/mL   Lipid Profile (Chol, Trig, HDL, LDL calc)   Result Value Ref Range    Cholesterol 183 <=199 mg/dL    Triglycerides 125 <=149 mg/dL    Direct Measure HDL 42 (L) >=50 mg/dL      Comment:      HDL Cholesterol Reference Range:     0-2 years:   No reference ranges established for patients under 2 years old  at [facility] for lipid analytes.    2-8 years:  Greater than 45 mg/dL     18 years and older:    Female: Greater than or equal to 50 mg/dL   Male:   Greater than or equal to 40 mg/dL    LDL Cholesterol Calculated 116 <=129 mg/dL    Patient Fasting > 8hrs? No        If you have any questions or concerns, please call the clinic at the number listed above.       Sincerely,      Abby Joshi MD

## 2022-01-28 NOTE — PROGRESS NOTES
Assessment & Plan    1. Essential hypertension:  This is a 65 yo female with poorly controlled hypertension (in general).  Now without blood pressure medication for some time.  BP is 169/95 today.  Will restart medications and will recheck in future.    - Comprehensive metabolic panel (BMP + Alb, Alk Phos, ALT, AST, Total. Bili, TP); Future  - Lipid Profile (Chol, Trig, HDL, LDL calc); Future  - Comprehensive metabolic panel (BMP + Alb, Alk Phos, ALT, AST, Total. Bili, TP)  - Lipid Profile (Chol, Trig, HDL, LDL calc)  - amLODIPine (NORVASC) 10 MG tablet; Take 1 tablet (10 mg) by mouth daily  Dispense: 90 tablet; Refill: 1  - atenolol (TENORMIN) 100 MG tablet; Take 1 tablet (100 mg) by mouth daily  Dispense: 90 tablet; Refill: 1  - cloNIDine (CATAPRES) 0.2 MG tablet; Take 1 tablet (0.2 mg) by mouth daily  Dispense: 90 tablet; Refill: 1  - potassium chloride ER (KLOR-CON M) 10 MEQ CR tablet; Take 1 tablet (10 mEq) by mouth daily  Dispense: 90 tablet; Refill: 1  - lisinopril (ZESTRIL) 20 MG tablet; TAKE 1 TABLET TWICE DAILY; Needs visit prior to future refills.  Dispense: 60 tablet; Refill: 3    2. Chronic migraine without aura without status migrainosus, not intractable  Patient's migraines have improved greatly, but still has occasional migraine that does not allow her to participate in activity.   Will renew her Indomethacin , which she takes for headaches.    - indomethacin (INDOCIN) 25 MG capsule; [INDOMETHACIN (INDOCIN) 25 MG CAPSULE] TAKE 1 CAPSULE  THREE TIMES DAILY AS NEEDED FOR HEADACHE  Dispense: 270 capsule; Refill: 1    3. Hypothyroidism, unspecified type  Patient notes she felt best on Levothyroxin 112 mcg daily.  Has been trying to make her current rx of lower dose Levothyroxine last, so taking every 2-3 days.  TSH today will reflect this.    - TSH with free T4 reflex; Future  - TSH with free T4 reflex  - levothyroxine (SYNTHROID/LEVOTHROID) 112 MCG tablet; Take 1 tablet (112 mcg) by mouth daily   Dispense: 90 tablet; Refill: 1    4. Moderate episode of recurrent major depressive disorder (H)  Patient continues to be stressed by changes around her - currently living in her house with her oldest grandchild (now a young adult).  He is now working nights at a grocery store, so she brings him to work and picks him up.  She enjoys having his company.  Her daughter  a couple years ago - she continues to grieve.  Her daughter's other children (all minors), are with their father, so she misses them; although, she was stressed by their behaviors when they lived with her.   - escitalopram (LEXAPRO) 20 MG tablet; Take 1 tablet (20 mg) by mouth daily  Dispense: 90 tablet; Refill: 1  - tiZANidine (ZANAFLEX) 4 MG tablet; Take 1 tablet (4 mg) by mouth 2 times daily as needed for muscle spasms (headache)  Dispense: 180 tablet; Refill: 1  - amoxapine (ASENDIN) 50 MG tablet; Take 1 tablet (50 mg) by mouth At Bedtime  Dispense: 90 tablet; Refill: 1    5. Swelling, mass, or lump on face  Patient presents with new (< 1 month) swelling in left submandibular area - she has a firm, but tender nearly 2 cm diameter lump.  Denies any oral lesions or tooth abscesses.  It is not getting any bigger at this time, but isn't getting smaller.  Will start Cephalexin at this time (discussed possible 10% cross reactivity with PCN).  If not appreciable change in 1 week, will need imaging (CT).  Patient is agreeable.  Will check hemogram as well.    - cephALEXin (KEFLEX) 500 MG capsule; Take 1 capsule (500 mg) by mouth 3 times daily for 10 days  Dispense: 30 capsule; Refill: 0  - CBC with platelets; Future  - CBC with platelets      Return in about 8 weeks (around 3/25/2022) for BP check, thyroid labs  (follow up in 1 week by phone/message regarding lump on left cheeck).    Chief Complaint   Patient presents with     Medication Problem     refill, udate on thyroid meds     Headache     Facial Swelling     left jaw and neck      HPI  Doesn't  have any medications - needs them refilled   Hasn't been taking any medications since August  Begged and got her BP medicine renewed  Has 1 thyroid medication left - has been taking it every 2 or 3 days to stretch it out    Left lateral chin - lump x 1 month  Tender at inferior portion -   No tooth issues             Patient Active Problem List   Diagnosis     Viral Syndrome     Acute Bronchitis     Muscle Aches, Generalized (Myalgias)     Pain During Urination (Dysuria)     Closed Fracture Of The Distal End Of The Radius     Acute Sinusitis     Allergies     Abnormal Weight Loss     Hypothyroidism     Migraine Headache     Impingement Of The Right Shoulder     Hypertension     Internal Derangement Of Left Knee     Syncope     Moderate episode of recurrent major depressive disorder (H)        History reviewed. No pertinent past medical history.     Current Outpatient Medications   Medication     amLODIPine (NORVASC) 10 MG tablet     amoxapine (ASENDIN) 50 MG tablet     amoxapine (ASENDIN) 50 MG tablet     atenolol (TENORMIN) 100 MG tablet     cephALEXin (KEFLEX) 500 MG capsule     cloNIDine (CATAPRES) 0.2 MG tablet     escitalopram (LEXAPRO) 20 MG tablet     indomethacin (INDOCIN) 25 MG capsule     levothyroxine (SYNTHROID/LEVOTHROID) 112 MCG tablet     lisinopril (ZESTRIL) 20 MG tablet     potassium chloride ER (KLOR-CON M) 10 MEQ CR tablet     tiZANidine (ZANAFLEX) 4 MG tablet     No current facility-administered medications for this visit.        Past Surgical History:   Procedure Laterality Date     OPEN REDUCTION INTERNAL FIXATION FOOT Right     dislocation (fell off ladder)     TONSILLECTOMY       TUBAL LIGATION          Social History     Socioeconomic History     Marital status:      Spouse name: Not on file     Number of children: Not on file     Years of education: Not on file     Highest education level: Not on file   Occupational History     Not on file   Tobacco Use     Smoking status: Current  Every Day Smoker     Types: Cigarettes, Cigarettes     Smokeless tobacco: Former User   Substance and Sexual Activity     Alcohol use: Not on file     Drug use: Not on file     Sexual activity: Not on file   Other Topics Concern     Not on file   Social History Narrative     2018 ( was 82)       Social Determinants of Health     Financial Resource Strain: Not on file   Food Insecurity: Not on file   Transportation Needs: Not on file   Physical Activity: Not on file   Stress: Not on file   Social Connections: Not on file   Intimate Partner Violence: Not on file   Housing Stability: Not on file        Family History   Problem Relation Age of Onset     Cerebral aneurysm Mother 83.00     Acute Myocardial Infarction Father          from the 8th MI     Alcoholism Daughter         Review of Systems   Constitutional: Positive for activity change (general decline in activity) and fatigue. Negative for chills, fever and unexpected weight change.   HENT: Negative for sinus pain.    Respiratory: Negative for cough, chest tightness and shortness of breath.    Cardiovascular: Negative for chest pain, palpitations and leg swelling.   Gastrointestinal: Negative for abdominal pain.   Neurological: Positive for headaches (less frequent than previous but BAD when she gets them).        BP (!) 169/95 (BP Location: Right arm, Patient Position: Sitting, Cuff Size: Adult Regular)   Pulse 61   Resp 16   Wt 52.8 kg (116 lb 8 oz)   BMI 22.75 kg/m       Physical Exam  Constitutional:       General: She is not in acute distress.     Appearance: She is well-developed. She is not toxic-appearing.      Comments: Weight has improved   HENT:      Right Ear: Tympanic membrane and external ear normal.      Left Ear: Tympanic membrane and external ear normal.      Nose: Nose normal.      Mouth/Throat:      Pharynx: No oropharyngeal exudate.   Eyes:      General:         Right eye: No discharge.         Left eye: No  discharge.      Conjunctiva/sclera: Conjunctivae normal.      Pupils: Pupils are equal, round, and reactive to light.   Neck:      Thyroid: No thyromegaly.      Trachea: No tracheal deviation.   Cardiovascular:      Rate and Rhythm: Normal rate and regular rhythm.      Pulses: Normal pulses.      Heart sounds: Normal heart sounds, S1 normal and S2 normal. No murmur heard.  No friction rub. No S3 or S4 sounds.    Pulmonary:      Effort: Pulmonary effort is normal. No respiratory distress.      Breath sounds: Normal breath sounds. No wheezing or rales.   Abdominal:      General: Bowel sounds are normal.      Palpations: Abdomen is soft. There is no mass.      Tenderness: There is no abdominal tenderness.   Musculoskeletal:         General: Normal range of motion.      Cervical back: Neck supple.   Lymphadenopathy:      Cervical: No cervical adenopathy.   Skin:     General: Skin is warm and dry.      Findings: No rash.      Comments: 2+ cm diameter firm, fairly fixed, tender, non-fluctuant lump at left lateral chin/submandibular region   Neurological:      General: No focal deficit present.      Mental Status: She is alert and oriented to person, place, and time.      Cranial Nerves: Cranial nerve deficit present.      Sensory: Sensory deficit present.      Motor: Weakness present. No abnormal muscle tone.      Coordination: Coordination abnormal.      Gait: Gait abnormal.      Deep Tendon Reflexes: Reflexes abnormal.   Psychiatric:         Thought Content: Thought content normal.          Results:  Results for orders placed or performed in visit on 01/28/22   CBC with platelets     Status: Normal   Result Value Ref Range    WBC Count 6.9 4.0 - 11.0 10e3/uL    RBC Count 4.50 3.80 - 5.20 10e6/uL    Hemoglobin 14.0 11.7 - 15.7 g/dL    Hematocrit 43.2 35.0 - 47.0 %    MCV 96 78 - 100 fL    MCH 31.1 26.5 - 33.0 pg    MCHC 32.4 31.5 - 36.5 g/dL    RDW 14.4 10.0 - 15.0 %    Platelet Count 259 150 - 450 10e3/uL        Medications at Conclusion of Visit:  Current Outpatient Medications   Medication Sig Dispense Refill     amLODIPine (NORVASC) 10 MG tablet Take 1 tablet (10 mg) by mouth daily 90 tablet 1     amoxapine (ASENDIN) 50 MG tablet Take 1 tablet (50 mg) by mouth At Bedtime 90 tablet 1     amoxapine (ASENDIN) 50 MG tablet [AMOXAPINE (ASENDIN) 50 MG TABLET] Take 1 tablet (50 mg total) by mouth at bedtime. 90 tablet 1     atenolol (TENORMIN) 100 MG tablet Take 1 tablet (100 mg) by mouth daily 90 tablet 1     cephALEXin (KEFLEX) 500 MG capsule Take 1 capsule (500 mg) by mouth 3 times daily for 10 days 30 capsule 0     cloNIDine (CATAPRES) 0.2 MG tablet Take 1 tablet (0.2 mg) by mouth daily 90 tablet 1     escitalopram (LEXAPRO) 20 MG tablet Take 1 tablet (20 mg) by mouth daily 90 tablet 1     indomethacin (INDOCIN) 25 MG capsule [INDOMETHACIN (INDOCIN) 25 MG CAPSULE] TAKE 1 CAPSULE  THREE TIMES DAILY AS NEEDED FOR HEADACHE 270 capsule 1     levothyroxine (SYNTHROID/LEVOTHROID) 112 MCG tablet Take 1 tablet (112 mcg) by mouth daily 90 tablet 1     lisinopril (ZESTRIL) 20 MG tablet TAKE 1 TABLET TWICE DAILY; Needs visit prior to future refills. 60 tablet 3     potassium chloride ER (KLOR-CON M) 10 MEQ CR tablet Take 1 tablet (10 mEq) by mouth daily 90 tablet 1     tiZANidine (ZANAFLEX) 4 MG tablet Take 1 tablet (4 mg) by mouth 2 times daily as needed for muscle spasms (headache) 180 tablet 1         FATOU RUTHEFRORD MD

## 2022-01-30 DIAGNOSIS — I10 ESSENTIAL HYPERTENSION: ICD-10-CM

## 2022-01-31 RX ORDER — LISINOPRIL 20 MG/1
TABLET ORAL
Qty: 60 TABLET | Refills: 3 | OUTPATIENT
Start: 2022-01-31

## 2022-02-01 ENCOUNTER — TELEPHONE (OUTPATIENT)
Dept: FAMILY MEDICINE | Facility: CLINIC | Age: 67
End: 2022-02-01
Payer: COMMERCIAL

## 2022-02-01 NOTE — TELEPHONE ENCOUNTER
"Routing refill request to provider for review/approval because:  Labs out of range:  POTASSIUM  Duplicate.    Last Written Prescription Date:  01/28/2022  Last Fill Quantity: 60,  # refills: 3   Last office visit provider:  01/28/2022 with Dr Vaz.     Requested Prescriptions   Pending Prescriptions Disp Refills     lisinopril (ZESTRIL) 20 MG tablet [Pharmacy Med Name: LISINOPRIL 20MG TABLETS] 60 tablet 3     Sig: TAKE 1 TABLET BY MOUTH TWICE DAILY       ACE Inhibitors (Including Combos) Protocol Failed - 1/30/2022  3:16 AM        Failed - Blood pressure under 140/90 in past 12 months     BP Readings from Last 3 Encounters:   01/28/22 (!) 169/95                 Failed - Normal serum potassium on file in past 12 months     Recent Labs   Lab Test 01/28/22  0804   POTASSIUM 3.1*             Passed - Recent (12 mo) or future (30 days) visit within the authorizing provider's specialty     Patient has had an office visit with the authorizing provider or a provider within the authorizing providers department within the previous 12 mos or has a future within next 30 days. See \"Patient Info\" tab in inbasket, or \"Choose Columns\" in Meds & Orders section of the refill encounter.              Passed - Medication is active on med list        Passed - Patient is age 18 or older        Passed - No active pregnancy on record        Passed - Normal serum creatinine on file in past 12 months     Recent Labs   Lab Test 01/28/22  0804   CR 0.72       Ok to refill medication if creatinine is low          Passed - No positive pregnancy test within past 12 months             Norma Robles 01/31/22 8:23 PM  "

## 2022-02-01 NOTE — TELEPHONE ENCOUNTER
Dr. Vaz,    Pt wondering why she is taking Amoxapin when she is taking Escitalopram as well?    RN did chart review and per latest OV note on 1/28/2022 pt is taking both medications.    RN routing message to PCP to review and advise.    JODY AburtoN, RN   [facility]

## 2022-02-01 NOTE — TELEPHONE ENCOUNTER
Reason for Call:  Other prescription    Detailed comments: Patient called to ask provider about this medication, amoxapine (ASENDIN) 50 MG tablet. She is not sure why she is taking this medication, she is already on another antidepressant medication. Please call patient to advise.    Phone Number Patient can be reached at: Home number on file [phone] (home)    Best Time: any    Can we leave a detailed message on this number? YES    Call taken on 2/1/2022 at 12:43 PM by Violet Au

## 2022-02-01 NOTE — TELEPHONE ENCOUNTER
She has taken this in the past - it is for sleep (taken at night); she is not using it at doses that would actually treat depression.  If she doesn't have sleep/pain issues, she could stop it.

## 2022-02-02 NOTE — TELEPHONE ENCOUNTER
RN called patient to relay Dr. Vaz's message below:  She has taken this in the past - it is for sleep (taken at night); she is not using it at doses that would actually treat depression.  If she doesn't have sleep/pain issues, she could stop it.          Patient stated she sleeps a couple of hours at night. Sometimes she needs to  her grandson, so she will still use it.      Patient will call the clinic back if she has any questions or concerns.      Di Kerr RN  [facility]

## 2022-02-07 ENCOUNTER — TELEPHONE (OUTPATIENT)
Dept: FAMILY MEDICINE | Facility: CLINIC | Age: 67
End: 2022-02-07
Payer: COMMERCIAL

## 2022-02-07 NOTE — TELEPHONE ENCOUNTER
Reason for Call: Provider Communication    Return Phone Number: [phone]    Who is calling:  [facility] provider is associated with:  n/a    Reason for call:  Patient called to notify provider that her infection on her face is almost gone.     Urgency for return call:  No call back needed.    Okay to leave detailed message?:  Yes    Call taken on 2/7/2022 at 12:06 PM by Violet Au

## 2022-04-11 DIAGNOSIS — I10 ESSENTIAL HYPERTENSION: ICD-10-CM

## 2022-04-14 RX ORDER — LISINOPRIL 20 MG/1
TABLET ORAL
Qty: 180 TABLET | Refills: 0 | Status: SHIPPED | OUTPATIENT
Start: 2022-04-14 | End: 2022-05-06

## 2022-04-14 NOTE — TELEPHONE ENCOUNTER
"Routing refill request to provider for review/approval because:  Labs out of range:  Potassium  BP warning  Early fill requested    Last Written Prescription Date:  1/28/22  Last Fill Quantity: 60,  # refills: 3   Last office visit provider:  1/28/22     Requested Prescriptions   Pending Prescriptions Disp Refills     lisinopril (ZESTRIL) 20 MG tablet 60 tablet 3     Sig: TAKE 1 TABLET TWICE DAILY; Needs visit prior to future refills.       ACE Inhibitors (Including Combos) Protocol Failed - 4/11/2022 11:04 AM        Failed - Blood pressure under 140/90 in past 12 months     BP Readings from Last 3 Encounters:   01/28/22 (!) 169/95                 Failed - Normal serum potassium on file in past 12 months     Recent Labs   Lab Test 01/28/22  0804   POTASSIUM 3.1*             Passed - Recent (12 mo) or future (30 days) visit within the authorizing provider's specialty     Patient has had an office visit with the authorizing provider or a provider within the authorizing providers department within the previous 12 mos or has a future within next 30 days. See \"Patient Info\" tab in inbasket, or \"Choose Columns\" in Meds & Orders section of the refill encounter.              Passed - Medication is active on med list        Passed - Patient is age 18 or older        Passed - No active pregnancy on record        Passed - Normal serum creatinine on file in past 12 months     Recent Labs   Lab Test 01/28/22  0804   CR 0.72       Ok to refill medication if creatinine is low          Passed - No positive pregnancy test within past 12 months             Norma Rahman RN 04/14/22 10:18 AM  "

## 2022-05-05 DIAGNOSIS — I10 ESSENTIAL HYPERTENSION: ICD-10-CM

## 2022-05-06 ENCOUNTER — TELEPHONE (OUTPATIENT)
Dept: FAMILY MEDICINE | Facility: CLINIC | Age: 67
End: 2022-05-06
Payer: COMMERCIAL

## 2022-05-06 DIAGNOSIS — I10 ESSENTIAL HYPERTENSION: Primary | ICD-10-CM

## 2022-05-06 DIAGNOSIS — Z76.0 ENCOUNTER FOR MEDICATION REFILL: ICD-10-CM

## 2022-05-06 RX ORDER — LISINOPRIL 20 MG/1
TABLET ORAL
Qty: 30 TABLET | Refills: 0 | Status: SHIPPED | OUTPATIENT
Start: 2022-05-06 | End: 2022-06-06

## 2022-05-06 NOTE — TELEPHONE ENCOUNTER
Reason for Call:  Medication or medication refill:    Do you use a [facility]? No     Name of the pharmacy and phone number for the current request:  [facility] @ [phone]    Name of the medication requested: Liisinopril  20 mg one tab twice daily     Other request: Pt had med ordered originally on 4/14/22 from [facility]. Pt never received med.  Pt has one more pill left.  TC sees there was another request yesterday for med but unsure where it was sent.     Can we leave a detailed message on this number? NO    Phone number patient can be reached at: Home number on file [phone] (home)    Best Time: anytime    Call taken on 5/6/2022 at 9:15 AM by VIVIANA Woodward

## 2022-05-08 RX ORDER — LISINOPRIL 20 MG/1
TABLET ORAL
Qty: 60 TABLET | OUTPATIENT
Start: 2022-05-08

## 2022-11-16 DIAGNOSIS — F33.1 MODERATE EPISODE OF RECURRENT MAJOR DEPRESSIVE DISORDER (H): ICD-10-CM

## 2022-11-16 RX ORDER — AMOXAPINE 50 MG/1
TABLET ORAL
Qty: 90 TABLET | Refills: 3 | OUTPATIENT
Start: 2022-11-16

## 2022-12-01 DIAGNOSIS — I10 ESSENTIAL HYPERTENSION: ICD-10-CM

## 2022-12-01 DIAGNOSIS — Z76.0 ENCOUNTER FOR MEDICATION REFILL: ICD-10-CM

## 2022-12-02 RX ORDER — LISINOPRIL 20 MG/1
TABLET ORAL
Qty: 200 TABLET | Refills: 2 | OUTPATIENT
Start: 2022-12-02

## 2022-12-31 ENCOUNTER — TELEPHONE (OUTPATIENT)
Dept: NURSING | Facility: CLINIC | Age: 67
End: 2022-12-31

## 2022-12-31 DIAGNOSIS — G43.709 CHRONIC MIGRAINE WITHOUT AURA WITHOUT STATUS MIGRAINOSUS, NOT INTRACTABLE: ICD-10-CM

## 2022-12-31 RX ORDER — INDOMETHACIN 25 MG/1
CAPSULE ORAL
Qty: 270 CAPSULE | Refills: 0 | Status: SHIPPED | OUTPATIENT
Start: 2022-12-31 | End: 2023-06-12

## 2022-12-31 NOTE — TELEPHONE ENCOUNTER
Pt calling stating that [facility] has contacted her twice stating that they are unable to fill Indocin for her as this is on back order and they do not no when they will be able to do so. Pt is out of that medication and is requesting that current RX be sent to [facility] so she can get this filled sooner than later.     Last OV addressing migraines 1/28/22.    RX on file sent to requested pharm.    Becky Carney RN, BSN  [facility] Nurse Advisor 3:00 PM 12/31/2022    Warnings Override History for indomethacin (INDOCIN) 25 MG capsule [[id number]]    Overridden by Abby Joshi MD on Nov 3, 2022 7:43 AM   Drug-Drug   1. SELECTIVE SEROTONIN REUPTAKE INHIBITORS / NSAIDS [Level: Major] [Reason: Tolerated medication/side effects in past]   Other Orders: escitalopram (LEXAPRO) 20 MG tablet      2. SELECTIVE SEROTONIN REUPTAKE INHIBITORS / NSAIDS [Level: Major] [Reason: Tolerated medication/side effects in past]   Other Orders: escitalopram (LEXAPRO) 20 MG tablet      3. INDOMETHACIN AND DERIVATIVES / ACE INHIBITORS [Level: Moderate] [Reason: Tolerated medication/side effects in past]   Other Orders: lisinopril (ZESTRIL) 20 MG tablet

## 2023-01-24 DIAGNOSIS — E03.9 HYPOTHYROIDISM, UNSPECIFIED TYPE: ICD-10-CM

## 2023-01-24 DIAGNOSIS — F33.1 MODERATE EPISODE OF RECURRENT MAJOR DEPRESSIVE DISORDER (H): ICD-10-CM

## 2023-01-24 DIAGNOSIS — I10 ESSENTIAL HYPERTENSION: ICD-10-CM

## 2023-01-25 RX ORDER — LEVOTHYROXINE SODIUM 112 UG/1
TABLET ORAL
Qty: 90 TABLET | Refills: 3 | Status: ON HOLD | OUTPATIENT
Start: 2023-01-25 | End: 2023-10-23

## 2023-01-25 RX ORDER — POTASSIUM CHLORIDE 750 MG/1
TABLET, EXTENDED RELEASE ORAL
Qty: 90 TABLET | Refills: 3 | Status: ON HOLD | OUTPATIENT
Start: 2023-01-25 | End: 2023-10-23

## 2023-01-25 RX ORDER — AMLODIPINE BESYLATE 10 MG/1
TABLET ORAL
Qty: 90 TABLET | Refills: 3 | Status: ON HOLD | OUTPATIENT
Start: 2023-01-25 | End: 2023-10-23

## 2023-01-25 RX ORDER — CLONIDINE HYDROCHLORIDE 0.2 MG/1
TABLET ORAL
Qty: 90 TABLET | Refills: 3 | Status: ON HOLD | OUTPATIENT
Start: 2023-01-25 | End: 2023-10-23

## 2023-01-25 RX ORDER — ESCITALOPRAM OXALATE 20 MG/1
TABLET ORAL
Qty: 90 TABLET | Refills: 3 | Status: SHIPPED | OUTPATIENT
Start: 2023-01-25 | End: 2023-11-14

## 2023-01-25 RX ORDER — ATENOLOL 100 MG/1
TABLET ORAL
Qty: 90 TABLET | Refills: 3 | Status: ON HOLD | OUTPATIENT
Start: 2023-01-25 | End: 2023-10-23

## 2023-01-25 NOTE — TELEPHONE ENCOUNTER
"Routing refill request to provider for review/approval because:  Labs out of range:  K, bp  phq 9  Due to be seen    Last Written Prescription Date:  11/3/22  Last Fill Quantity: 90,  # refills: 0   Last office visit provider:  1/28/22     Requested Prescriptions   Pending Prescriptions Disp Refills     escitalopram (LEXAPRO) 20 MG tablet [Pharmacy Med Name: Escitalopram Oxalate 20 MG Oral Tablet] 90 tablet 3     Sig: TAKE 1 TABLET BY MOUTH DAILY       SSRIs Protocol Failed - 1/24/2023  9:16 PM        Failed - PHQ-9 score less than 5 in past 6 months     Please review last PHQ-9 score.           Failed - Recent (6 mo) or future (30 days) visit within the authorizing provider's specialty     Patient had office visit in the last 6 months or has a visit in the next 30 days with authorizing provider or within the authorizing provider's specialty.  See \"Patient Info\" tab in inbasket, or \"Choose Columns\" in Meds & Orders section of the refill encounter.            Passed - Medication is active on med list        Passed - Patient is age 18 or older        Passed - No active pregnancy on record        Passed - No positive pregnancy test in last 12 months           amLODIPine (NORVASC) 10 MG tablet [Pharmacy Med Name: amLODIPine Besylate 10 MG Oral Tablet] 90 tablet 3     Sig: TAKE 1 TABLET BY MOUTH DAILY       Calcium Channel Blockers Protocol  Failed - 1/24/2023  9:16 PM        Failed - Blood pressure under 140/90 in past 12 months     BP Readings from Last 3 Encounters:   01/28/22 (!) 169/95                 Passed - Recent (12 mo) or future (30 days) visit within the authorizing provider's specialty     Patient has had an office visit with the authorizing provider or a provider within the authorizing providers department within the previous 12 mos or has a future within next 30 days. See \"Patient Info\" tab in inbasket, or \"Choose Columns\" in Meds & Orders section of the refill encounter.              Passed - Medication " "is active on med list        Passed - Patient is age 18 or older        Passed - No active pregnancy on record        Passed - Normal serum creatinine on file in past 12 months     Recent Labs   Lab Test 01/28/22  0804   CR 0.72       Ok to refill medication if creatinine is low          Passed - No positive pregnancy test in past 12 months           potassium chloride ER (KLOR-CON M) 10 MEQ CR tablet [Pharmacy Med Name: Potassium Chloride Carolyn ER 10 MEQ Oral Tablet Extended Release] 90 tablet 3     Sig: TAKE 1 TABLET BY MOUTH DAILY       Potassium Supplements Protocol Failed - 1/24/2023  9:16 PM        Failed - Normal serum potassium in past 12 months     Recent Labs   Lab Test 01/28/22  0804   POTASSIUM 3.1*                    Passed - Recent (12 mo) or future (30 days) visit within the authorizing provider's department     Patient has had an office visit with the authorizing provider or a provider within the authorizing providers department within the previous 12 mos or has a future within next 30 days. See \"Patient Info\" tab in [employer], or \"Choose Columns\" in Meds & Orders section of the refill encounter.              Passed - Medication is active on med list        Passed - Patient is age 18 or older           atenolol (TENORMIN) 100 MG tablet [Pharmacy Med Name: Atenolol 100 MG Oral Tablet] 90 tablet 3     Sig: TAKE 1 TABLET BY MOUTH DAILY       Beta-Blockers Protocol Failed - 1/24/2023  9:16 PM        Failed - Blood pressure under 140/90 in past 12 months     BP Readings from Last 3 Encounters:   01/28/22 (!) 169/95                 Passed - Patient is age 6 or older        Passed - Recent (12 mo) or future (30 days) visit within the authorizing provider's specialty     Patient has had an office visit with the authorizing provider or a provider within the authorizing providers department within the previous 12 mos or has a future within next 30 days. See \"Patient Info\" tab in [employer], or \"Choose Columns\" in " "Meds & Orders section of the refill encounter.              Passed - Medication is active on med list           cloNIDine (CATAPRES) 0.2 MG tablet [Pharmacy Med Name: cloNIDine HCl 0.2 MG Oral Tablet] 90 tablet 3     Sig: TAKE 1 TABLET BY MOUTH DAILY       Central Acting Antiadrenergic Agents Failed - 1/24/2023  9:16 PM        Failed - Blood pressure under 140/90 in past 12 months     BP Readings from Last 3 Encounters:   01/28/22 (!) 169/95                 Passed - Patient is 6 years of age or older        Passed - Recent (12 mo) or future (30 days) visit within the authorizing provider's specialty     Patient has had an office visit with the authorizing provider or a provider within the authorizing providers department within the previous 12 mos or has a future within next 30 days. See \"Patient Info\" tab in inbasket, or \"Choose Columns\" in Meds & Orders section of the refill encounter.              Passed - Medication is active on med list        Passed - Patient not pregnant        Passed - Normal serum creatinine on file within past 12 months     Recent Labs   Lab Test 01/28/22  0804   CR 0.72       Ok to refill medication if creatinine is low          Passed - No positive pregnancy test on file in past 12 months       Antiadrenergic Antihypertensives Failed - 1/24/2023  9:16 PM        Failed - Blood pressure less than 140/90 in past 6 months     BP Readings from Last 3 Encounters:   01/28/22 (!) 169/95                 Failed - Recent (6 mo) or future (30 days) visit within the authorizing provider's specialty     Patient had office visit in the last 6 months or has a visit in the next 30 days with authorizing provider or within the authorizing provider's specialty.  See \"Patient Info\" tab in inbasket, or \"Choose Columns\" in Meds & Orders section of the refill encounter.            Passed - Medication is active on med list        Passed - Patient is age 18 or older        Passed - No active pregnancy on record " "       Passed - Normal serum creatinine on file in past 12 months     Recent Labs   Lab Test 01/28/22  0804   CR 0.72       Ok to refill medication if creatinine is low          Passed - No positive pregnancy test within past 12 months           levothyroxine (SYNTHROID/LEVOTHROID) 112 MCG tablet [Pharmacy Med Name: Levothyroxine Sodium 112 MCG Oral Tablet] 90 tablet 3     Sig: TAKE 1 TABLET BY MOUTH DAILY       Thyroid Protocol Passed - 1/24/2023  9:16 PM        Passed - Patient is 12 years or older        Passed - Recent (12 mo) or future (30 days) visit within the authorizing provider's specialty     Patient has had an office visit with the authorizing provider or a provider within the authorizing providers department within the previous 12 mos or has a future within next 30 days. See \"Patient Info\" tab in inbasket, or \"Choose Columns\" in Meds & Orders section of the refill encounter.              Passed - Medication is active on med list        Passed - Normal TSH on file in past 12 months     Recent Labs   Lab Test 01/28/22  0804   TSH 1.15              Passed - No active pregnancy on record     If patient is pregnant or has had a positive pregnancy test, please check TSH.          Passed - No positive pregnancy test in past 12 months     If patient is pregnant or has had a positive pregnancy test, please check TSH.               Stella Paige, RN 01/25/23 3:10 PM  "

## 2023-02-19 NOTE — TELEPHONE ENCOUNTER
Reason for Call:  Medication or medication refill:    Do you use a [facility]?  Name of the pharmacy and phone number for the current request: [email or web address] [email or web address]     Name of the medication requested: methylthenidade 20mg    Other request: only has 2 pills left    Can we leave a detailed message on this number? Yes    Phone number patient can be reached at:   Cell number on file:    Telephone Information:   Mobile [phone]       Best Time: anytime    Call taken on 12/18/2020 at 10:47 AM by Chantell Barger   Started 36-year-old female with presents emergency department due to posterior headache rating up towards the front.  Patient admits to nausea and vomiting when she stands up.  Denies any visual changes no phonophobia or photophobia.  No nuchal rigidity.  No fevers or chills.  Patient states that she has migraines in the past although this feels different.  Patient states headaches lasted for approximately 5 days at this point.  Patient no new exposures no new medicine otherwise well-appearing except for persistent headache.Patient admits to anemia but otherwise no other history and migraines intermittently but states this feels different than previous episodes.  No other neurological deficits steady and ambulatory gait. Started 36-year-old female with presents emergency department due to posterior headache rating up towards the front.  Patient admits to nausea and vomiting when she stands up.  Denies any visual changes no phonophobia or photophobia.  No nuchal rigidity.  No fevers or chills.  Patient states that she has migraines in the past although this feels different.  Patient states headaches lasted for approximately 5 days at this point.  Patient no new exposures no new medicine otherwise well-appearing except for persistent headache. Patient admits to anemia but otherwise no other history and migraines intermittently but states this feels different than previous episodes.  No other neurological deficits steady and ambulatory gait.

## 2023-05-31 ENCOUNTER — OFFICE VISIT (OUTPATIENT)
Dept: FAMILY MEDICINE | Facility: CLINIC | Age: 68
End: 2023-05-31
Payer: COMMERCIAL

## 2023-05-31 VITALS
DIASTOLIC BLOOD PRESSURE: 61 MMHG | BODY MASS INDEX: 22.97 KG/M2 | SYSTOLIC BLOOD PRESSURE: 103 MMHG | RESPIRATION RATE: 18 BRPM | HEIGHT: 60 IN | HEART RATE: 69 BPM | WEIGHT: 117 LBS | TEMPERATURE: 98.2 F | OXYGEN SATURATION: 97 %

## 2023-05-31 DIAGNOSIS — Z12.31 VISIT FOR SCREENING MAMMOGRAM: ICD-10-CM

## 2023-05-31 DIAGNOSIS — I10 ESSENTIAL HYPERTENSION: ICD-10-CM

## 2023-05-31 DIAGNOSIS — Z00.00 ENCOUNTER FOR MEDICARE ANNUAL WELLNESS EXAM: Primary | ICD-10-CM

## 2023-05-31 DIAGNOSIS — E03.9 HYPOTHYROIDISM, UNSPECIFIED TYPE: ICD-10-CM

## 2023-05-31 DIAGNOSIS — Z12.11 SCREEN FOR COLON CANCER: ICD-10-CM

## 2023-05-31 DIAGNOSIS — Z23 NEED FOR COVID-19 VACCINE: ICD-10-CM

## 2023-05-31 DIAGNOSIS — Z23 NEED FOR VACCINATION FOR PNEUMOCOCCUS: ICD-10-CM

## 2023-05-31 DIAGNOSIS — F33.1 MODERATE EPISODE OF RECURRENT MAJOR DEPRESSIVE DISORDER (H): ICD-10-CM

## 2023-05-31 DIAGNOSIS — Z76.0 ENCOUNTER FOR MEDICATION REFILL: ICD-10-CM

## 2023-05-31 LAB
ALBUMIN SERPL BCG-MCNC: 4 G/DL (ref 3.5–5.2)
ALP SERPL-CCNC: 128 U/L (ref 35–104)
ALT SERPL W P-5'-P-CCNC: 6 U/L (ref 10–35)
ANION GAP SERPL CALCULATED.3IONS-SCNC: 15 MMOL/L (ref 7–15)
AST SERPL W P-5'-P-CCNC: 12 U/L (ref 10–35)
BILIRUB SERPL-MCNC: <0.2 MG/DL
BUN SERPL-MCNC: 11.5 MG/DL (ref 8–23)
CALCIUM SERPL-MCNC: 9.3 MG/DL (ref 8.8–10.2)
CHLORIDE SERPL-SCNC: 107 MMOL/L (ref 98–107)
CHOLEST SERPL-MCNC: 178 MG/DL
CREAT SERPL-MCNC: 0.76 MG/DL (ref 0.51–0.95)
DEPRECATED HCO3 PLAS-SCNC: 19 MMOL/L (ref 22–29)
ERYTHROCYTE [DISTWIDTH] IN BLOOD BY AUTOMATED COUNT: 15.1 % (ref 10–15)
GFR SERPL CREATININE-BSD FRML MDRD: 85 ML/MIN/1.73M2
GLUCOSE SERPL-MCNC: 109 MG/DL (ref 70–99)
HCT VFR BLD AUTO: 40.3 % (ref 35–47)
HDLC SERPL-MCNC: 43 MG/DL
HGB BLD-MCNC: 12.3 G/DL (ref 11.7–15.7)
LDLC SERPL CALC-MCNC: 102 MG/DL
MCH RBC QN AUTO: 27.9 PG (ref 26.5–33)
MCHC RBC AUTO-ENTMCNC: 30.5 G/DL (ref 31.5–36.5)
MCV RBC AUTO: 91 FL (ref 78–100)
NONHDLC SERPL-MCNC: 135 MG/DL
PLATELET # BLD AUTO: 365 10E3/UL (ref 150–450)
POTASSIUM SERPL-SCNC: 4 MMOL/L (ref 3.4–5.3)
PROT SERPL-MCNC: 6.9 G/DL (ref 6.4–8.3)
RBC # BLD AUTO: 4.41 10E6/UL (ref 3.8–5.2)
SODIUM SERPL-SCNC: 141 MMOL/L (ref 136–145)
T4 FREE SERPL-MCNC: 1.96 NG/DL (ref 0.9–1.7)
TRIGL SERPL-MCNC: 166 MG/DL
TSH SERPL DL<=0.005 MIU/L-ACNC: 0.01 UIU/ML (ref 0.3–4.2)
WBC # BLD AUTO: 9.1 10E3/UL (ref 4–11)

## 2023-05-31 PROCEDURE — G0009 ADMIN PNEUMOCOCCAL VACCINE: HCPCS | Performed by: FAMILY MEDICINE

## 2023-05-31 PROCEDURE — 0121A COVID-19 BIVALENT 12+ (PFIZER): CPT | Performed by: FAMILY MEDICINE

## 2023-05-31 PROCEDURE — 84439 ASSAY OF FREE THYROXINE: CPT | Performed by: FAMILY MEDICINE

## 2023-05-31 PROCEDURE — 99000 SPECIMEN HANDLING OFFICE-LAB: CPT | Performed by: FAMILY MEDICINE

## 2023-05-31 PROCEDURE — 91312 COVID-19 BIVALENT 12+ (PFIZER): CPT | Performed by: FAMILY MEDICINE

## 2023-05-31 PROCEDURE — 85027 COMPLETE CBC AUTOMATED: CPT | Performed by: FAMILY MEDICINE

## 2023-05-31 PROCEDURE — 84244 ASSAY OF RENIN: CPT | Mod: 90 | Performed by: FAMILY MEDICINE

## 2023-05-31 PROCEDURE — 80053 COMPREHEN METABOLIC PANEL: CPT | Performed by: FAMILY MEDICINE

## 2023-05-31 PROCEDURE — 82088 ASSAY OF ALDOSTERONE: CPT | Performed by: FAMILY MEDICINE

## 2023-05-31 PROCEDURE — 80061 LIPID PANEL: CPT | Performed by: FAMILY MEDICINE

## 2023-05-31 PROCEDURE — 90677 PCV20 VACCINE IM: CPT | Performed by: FAMILY MEDICINE

## 2023-05-31 PROCEDURE — 84443 ASSAY THYROID STIM HORMONE: CPT | Performed by: FAMILY MEDICINE

## 2023-05-31 PROCEDURE — G0438 PPPS, INITIAL VISIT: HCPCS | Performed by: FAMILY MEDICINE

## 2023-05-31 PROCEDURE — 99214 OFFICE O/P EST MOD 30 MIN: CPT | Mod: 25 | Performed by: FAMILY MEDICINE

## 2023-05-31 PROCEDURE — 36415 COLL VENOUS BLD VENIPUNCTURE: CPT | Performed by: FAMILY MEDICINE

## 2023-05-31 RX ORDER — LISINOPRIL 20 MG/1
20 TABLET ORAL 2 TIMES DAILY
Qty: 180 TABLET | Refills: 3 | Status: ON HOLD | OUTPATIENT
Start: 2023-05-31 | End: 2023-10-24

## 2023-05-31 RX ORDER — AMOXAPINE 50 MG/1
50 TABLET ORAL AT BEDTIME
Qty: 90 TABLET | Refills: 3 | Status: SHIPPED | OUTPATIENT
Start: 2023-05-31 | End: 2023-10-15

## 2023-05-31 ASSESSMENT — ENCOUNTER SYMPTOMS
COUGH: 1
NAUSEA: 0
HEMATOCHEZIA: 0
JOINT SWELLING: 0
WEAKNESS: 0
MYALGIAS: 0
CONSTIPATION: 1
PALPITATIONS: 0
NERVOUS/ANXIOUS: 0
HEADACHES: 1
ABDOMINAL PAIN: 0
DIZZINESS: 0
ARTHRALGIAS: 1
HEARTBURN: 1
SHORTNESS OF BREATH: 0
EYE PAIN: 0
CHILLS: 0
HEMATURIA: 0
BREAST MASS: 0
DYSURIA: 0
DIARRHEA: 0
PARESTHESIAS: 0
SORE THROAT: 0
FREQUENCY: 0
FEVER: 0

## 2023-05-31 ASSESSMENT — PATIENT HEALTH QUESTIONNAIRE - PHQ9
SUM OF ALL RESPONSES TO PHQ QUESTIONS 1-9: 3
SUM OF ALL RESPONSES TO PHQ QUESTIONS 1-9: 3
10. IF YOU CHECKED OFF ANY PROBLEMS, HOW DIFFICULT HAVE THESE PROBLEMS MADE IT FOR YOU TO DO YOUR WORK, TAKE CARE OF THINGS AT HOME, OR GET ALONG WITH OTHER PEOPLE: NOT DIFFICULT AT ALL

## 2023-05-31 ASSESSMENT — ACTIVITIES OF DAILY LIVING (ADL): CURRENT_FUNCTION: NO ASSISTANCE NEEDED
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
## 2023-06-02 LAB
ALDOST SERPL-MCNC: 7.6 NG/DL (ref 0–31)
RENIN PLAS-CCNC: 6.8 NG/ML/HR

## 2023-06-06 LAB — ALDOST/RENIN PLAS-RTO: 1.1 {RATIO} (ref 0–25)

## 2023-06-12 DIAGNOSIS — G43.709 CHRONIC MIGRAINE WITHOUT AURA WITHOUT STATUS MIGRAINOSUS, NOT INTRACTABLE: ICD-10-CM

## 2023-06-12 RX ORDER — INDOMETHACIN 25 MG/1
CAPSULE ORAL
Qty: 270 CAPSULE | Refills: 0 | Status: SHIPPED | OUTPATIENT
Start: 2023-06-12 | End: 2023-08-18

## 2023-06-12 NOTE — TELEPHONE ENCOUNTER
"Routing refill request to provider for review/approval because:  Labs not current:  Multiple  Age warning    Last Written Prescription Date:  12/31/22  Last Fill Quantity: 270,  # refills: 0   Last office visit provider:  5/31/23     Requested Prescriptions   Pending Prescriptions Disp Refills     indomethacin (INDOCIN) 25 MG capsule 270 capsule 0     Sig: TAKE 1 CAPSULE BY MOUTH 3 TIMES DAILY AS NEEDED FOR HEADACHE(S)       Gout Agents Protocol Failed - 6/12/2023  2:11 PM        Failed - Has Uric Acid on file in past 12 months and value is less than 6     No lab results found.  If level is 6mg/dL or greater, ok to refill one time and refer to provider.           Passed - CBC on file in past 12 months     Recent Labs   Lab Test 05/31/23 0822   WBC 9.1   RBC 4.41   HGB 12.3   HCT 40.3                    Passed - ALT on file in past 12 months     Recent Labs   Lab Test 05/31/23  0822   ALT 6*             Passed - Recent (12 mo) or future (30 days) visit within the authorizing provider's specialty     Patient has had an office visit with the authorizing provider or a provider within the authorizing providers department within the previous 12 mos or has a future within next 30 days. See \"Patient Info\" tab in inbasket, or \"Choose Columns\" in Meds & Orders section of the refill encounter.              Passed - Medication is active on med list        Passed - Patient is age 18 or older        Passed - No active pregnancy on record        Passed - Normal serum creatinine on file in the past 12 months     Recent Labs   Lab Test 05/31/23 0822   CR 0.76       Ok to refill medication if creatinine is low          Passed - No positive pregnancy test in past year       NSAID Medications Failed - 6/12/2023  2:11 PM        Failed - Normal ALT on file in past 12 months     Recent Labs   Lab Test 05/31/23  0822   ALT 6*             Failed - Patient is age 6-64 years        Passed - Blood pressure under 140/90 in past 12 " "months     BP Readings from Last 3 Encounters:   05/31/23 103/61   01/28/22 (!) 169/95                 Passed - Normal AST on file in past 12 months     Recent Labs   Lab Test 05/31/23  0822   AST 12             Passed - Recent (12 mo) or future (30 days) visit within the authorizing provider's specialty     Patient has had an office visit with the authorizing provider or a provider within the authorizing providers department within the previous 12 mos or has a future within next 30 days. See \"Patient Info\" tab in inbasket, or \"Choose Columns\" in Meds & Orders section of the refill encounter.              Passed - Normal CBC on file in past 12 months     Recent Labs   Lab Test 05/31/23 0822   WBC 9.1   RBC 4.41   HGB 12.3   HCT 40.3                    Passed - Medication is active on med list        Passed - No active pregnancy on record        Passed - Normal serum creatinine on file in past 12 months     Recent Labs   Lab Test 05/31/23 0822   CR 0.76       Ok to refill medication if creatinine is low          Passed - No positive pregnancy test in past 12 months             Luis Enrique Perez RN 06/12/23 2:11 PM  "

## 2023-06-12 NOTE — TELEPHONE ENCOUNTER
Medication Question or Refill    Contacts       Type Contact Phone/Fax    06/12/2023 08:08 AM CDT Phone (Incoming) Keyanna Palafox (Self) [phone] (H)          What medication are you calling about (include dose and sig)?: indomethacin (INDOCIN) 25 MG capsule    Preferred Pharmacy:     [id number] - [place], [place] - [address] AT [facility] [address]  [address]  [place] [address]  Phone: [phone] Fax: [phone]      Controlled Substance Agreement on file:   CSA -- Patient Level:    CSA: None found at the patient level.       Who prescribed the medication?: PCP    Do you need a refill? Yes    When did you use the medication last? One Month ago    Patient offered an appointment? No    Do you have any questions or concerns?  Yes: is out and needs refill ASAP      Okay to leave a detailed message?: Yes at Home number on file [phone] (home)

## 2023-08-18 DIAGNOSIS — G43.709 CHRONIC MIGRAINE WITHOUT AURA WITHOUT STATUS MIGRAINOSUS, NOT INTRACTABLE: ICD-10-CM

## 2023-08-18 RX ORDER — INDOMETHACIN 25 MG/1
CAPSULE ORAL
Qty: 270 CAPSULE | Refills: 3 | Status: ON HOLD | OUTPATIENT
Start: 2023-08-18 | End: 2023-10-24

## 2023-08-18 NOTE — TELEPHONE ENCOUNTER
"Routing refill request to provider for review/approval because:  Labs not current:  Uric Acid    Last Written Prescription Date:  6/12/23  Last Fill Quantity: 270,  # refills: 0   Last office visit provider:   5/31/23    Requested Prescriptions   Pending Prescriptions Disp Refills    indomethacin (INDOCIN) 25 MG capsule [Pharmacy Med Name: INDOMETHACIN  25MG  CAP] 270 capsule 3     Sig: TAKE 1 CAPSULE BY MOUTH 3 TIMES  DAILY AS NEEDED FOR HEADACHE(S)       Gout Agents Protocol Failed - 8/18/2023 12:24 PM        Failed - Has Uric Acid on file in past 12 months and value is less than 6     No lab results found.  If level is 6mg/dL or greater, ok to refill one time and refer to provider.           Passed - CBC on file in past 12 months     Recent Labs   Lab Test 05/31/23  0822   WBC 9.1   RBC 4.41   HGB 12.3   HCT 40.3                    Passed - ALT on file in past 12 months     Recent Labs   Lab Test 05/31/23  0822   ALT 6*             Passed - Recent (12 mo) or future (30 days) visit within the authorizing provider's specialty     Patient has had an office visit with the authorizing provider or a provider within the authorizing providers department within the previous 12 mos or has a future within next 30 days. See \"Patient Info\" tab in inbasket, or \"Choose Columns\" in Meds & Orders section of the refill encounter.              Passed - Medication is active on med list        Passed - Patient is age 18 or older        Passed - No active pregnancy on record        Passed - Normal serum creatinine on file in the past 12 months     Recent Labs   Lab Test 05/31/23  0822   CR 0.76       Ok to refill medication if creatinine is low          Passed - No positive pregnancy test in past year       NSAID Medications Failed - 8/18/2023 12:24 PM        Failed - Normal ALT on file in past 12 months     Recent Labs   Lab Test 05/31/23  0822   ALT 6*             Failed - Patient is age 6-64 years        Passed - Blood " "pressure under 140/90 in past 12 months     BP Readings from Last 3 Encounters:   05/31/23 103/61   01/28/22 (!) 169/95                 Passed - Normal AST on file in past 12 months     Recent Labs   Lab Test 05/31/23 0822   AST 12             Passed - Recent (12 mo) or future (30 days) visit within the authorizing provider's specialty     Patient has had an office visit with the authorizing provider or a provider within the authorizing providers department within the previous 12 mos or has a future within next 30 days. See \"Patient Info\" tab in inbasket, or \"Choose Columns\" in Meds & Orders section of the refill encounter.              Passed - Normal CBC on file in past 12 months     Recent Labs   Lab Test 05/31/23 0822   WBC 9.1   RBC 4.41   HGB 12.3   HCT 40.3                    Passed - Medication is active on med list        Passed - No active pregnancy on record        Passed - Normal serum creatinine on file in past 12 months     Recent Labs   Lab Test 05/31/23 0822   CR 0.76       Ok to refill medication if creatinine is low          Passed - No positive pregnancy test in past 12 months             JEYSON DAVIS RN 08/18/23 12:24 PM  "

## 2023-10-14 ENCOUNTER — APPOINTMENT (OUTPATIENT)
Dept: CT IMAGING | Facility: HOSPITAL | Age: 68
DRG: 871 | End: 2023-10-14
Attending: EMERGENCY MEDICINE
Payer: COMMERCIAL

## 2023-10-14 ENCOUNTER — APPOINTMENT (OUTPATIENT)
Dept: RADIOLOGY | Facility: HOSPITAL | Age: 68
DRG: 871 | End: 2023-10-14
Attending: EMERGENCY MEDICINE
Payer: COMMERCIAL

## 2023-10-14 ENCOUNTER — HOSPITAL ENCOUNTER (INPATIENT)
Facility: HOSPITAL | Age: 68
LOS: 9 days | Discharge: NURSING, REHAB OR LONG-TERM CARE | DRG: 871 | End: 2023-10-24
Attending: EMERGENCY MEDICINE | Admitting: FAMILY MEDICINE
Payer: COMMERCIAL

## 2023-10-14 DIAGNOSIS — D64.9 ANEMIA, UNSPECIFIED TYPE: ICD-10-CM

## 2023-10-14 DIAGNOSIS — K29.01 ACUTE GASTRITIS WITH HEMORRHAGE, UNSPECIFIED GASTRITIS TYPE: Primary | ICD-10-CM

## 2023-10-14 DIAGNOSIS — R10.33 PERIUMBILICAL ABDOMINAL PAIN: ICD-10-CM

## 2023-10-14 DIAGNOSIS — I10 ESSENTIAL HYPERTENSION: ICD-10-CM

## 2023-10-14 LAB
ALBUMIN SERPL BCG-MCNC: 3.2 G/DL (ref 3.5–5.2)
ALP SERPL-CCNC: 95 U/L (ref 35–104)
ALT SERPL W P-5'-P-CCNC: 7 U/L (ref 0–50)
ANION GAP SERPL CALCULATED.3IONS-SCNC: 24 MMOL/L (ref 7–15)
AST SERPL W P-5'-P-CCNC: 11 U/L (ref 0–45)
BASO+EOS+MONOS # BLD AUTO: ABNORMAL 10*3/UL
BASO+EOS+MONOS NFR BLD AUTO: ABNORMAL %
BASOPHILS # BLD AUTO: 0.1 10E3/UL (ref 0–0.2)
BASOPHILS NFR BLD AUTO: 0 %
BILIRUB DIRECT SERPL-MCNC: <0.2 MG/DL (ref 0–0.3)
BILIRUB SERPL-MCNC: 0.2 MG/DL
BUN SERPL-MCNC: 29.7 MG/DL (ref 8–23)
CALCIUM SERPL-MCNC: 9 MG/DL (ref 8.8–10.2)
CHLORIDE SERPL-SCNC: 97 MMOL/L (ref 98–107)
CREAT SERPL-MCNC: 1.13 MG/DL (ref 0.51–0.95)
DEPRECATED HCO3 PLAS-SCNC: 17 MMOL/L (ref 22–29)
EGFRCR SERPLBLD CKD-EPI 2021: 52 ML/MIN/1.73M2
EOSINOPHIL # BLD AUTO: 0 10E3/UL (ref 0–0.7)
EOSINOPHIL NFR BLD AUTO: 0 %
ERYTHROCYTE [DISTWIDTH] IN BLOOD BY AUTOMATED COUNT: 15.4 % (ref 10–15)
FLUAV RNA SPEC QL NAA+PROBE: NEGATIVE
FLUBV RNA RESP QL NAA+PROBE: NEGATIVE
GLUCOSE SERPL-MCNC: 140 MG/DL (ref 70–99)
HCT VFR BLD AUTO: 28.7 % (ref 35–47)
HGB BLD-MCNC: 8.7 G/DL (ref 11.7–15.7)
HOLD SPECIMEN: NORMAL
IMM GRANULOCYTES # BLD: 0.3 10E3/UL
IMM GRANULOCYTES NFR BLD: 2 %
LACTATE SERPL-SCNC: 2.5 MMOL/L (ref 0.7–2)
LACTATE SERPL-SCNC: 5.1 MMOL/L (ref 0.7–2)
LIPASE SERPL-CCNC: 20 U/L (ref 13–60)
LYMPHOCYTES # BLD AUTO: 1.3 10E3/UL (ref 0.8–5.3)
LYMPHOCYTES NFR BLD AUTO: 9 %
MCH RBC QN AUTO: 26 PG (ref 26.5–33)
MCHC RBC AUTO-ENTMCNC: 30.3 G/DL (ref 31.5–36.5)
MCV RBC AUTO: 86 FL (ref 78–100)
MONOCYTES # BLD AUTO: 0.6 10E3/UL (ref 0–1.3)
MONOCYTES NFR BLD AUTO: 4 %
NEUTROPHILS # BLD AUTO: 12.3 10E3/UL (ref 1.6–8.3)
NEUTROPHILS NFR BLD AUTO: 85 %
NRBC # BLD AUTO: 0 10E3/UL
NRBC BLD AUTO-RTO: 0 /100
PLATELET # BLD AUTO: 603 10E3/UL (ref 150–450)
POTASSIUM SERPL-SCNC: 4.1 MMOL/L (ref 3.4–5.3)
PROT SERPL-MCNC: 6.6 G/DL (ref 6.4–8.3)
RBC # BLD AUTO: 3.34 10E6/UL (ref 3.8–5.2)
RSV RNA SPEC NAA+PROBE: NEGATIVE
SARS-COV-2 RNA RESP QL NAA+PROBE: NEGATIVE
SODIUM SERPL-SCNC: 138 MMOL/L (ref 135–145)
TROPONIN T SERPL HS-MCNC: 12 NG/L
TROPONIN T SERPL HS-MCNC: 15 NG/L
WBC # BLD AUTO: 14.6 10E3/UL (ref 4–11)

## 2023-10-14 PROCEDURE — 36415 COLL VENOUS BLD VENIPUNCTURE: CPT | Performed by: EMERGENCY MEDICINE

## 2023-10-14 PROCEDURE — 99285 EMERGENCY DEPT VISIT HI MDM: CPT | Mod: 25

## 2023-10-14 PROCEDURE — 87040 BLOOD CULTURE FOR BACTERIA: CPT | Performed by: EMERGENCY MEDICINE

## 2023-10-14 PROCEDURE — 84484 ASSAY OF TROPONIN QUANT: CPT | Performed by: EMERGENCY MEDICINE

## 2023-10-14 PROCEDURE — 250N000011 HC RX IP 250 OP 636: Mod: JZ | Performed by: EMERGENCY MEDICINE

## 2023-10-14 PROCEDURE — 96367 TX/PROPH/DG ADDL SEQ IV INF: CPT

## 2023-10-14 PROCEDURE — 250N000011 HC RX IP 250 OP 636: Performed by: EMERGENCY MEDICINE

## 2023-10-14 PROCEDURE — 93005 ELECTROCARDIOGRAM TRACING: CPT | Performed by: EMERGENCY MEDICINE

## 2023-10-14 PROCEDURE — 80053 COMPREHEN METABOLIC PANEL: CPT | Performed by: EMERGENCY MEDICINE

## 2023-10-14 PROCEDURE — 85025 COMPLETE CBC W/AUTO DIFF WBC: CPT | Performed by: EMERGENCY MEDICINE

## 2023-10-14 PROCEDURE — 83605 ASSAY OF LACTIC ACID: CPT | Performed by: EMERGENCY MEDICINE

## 2023-10-14 PROCEDURE — 82248 BILIRUBIN DIRECT: CPT | Performed by: EMERGENCY MEDICINE

## 2023-10-14 PROCEDURE — 258N000003 HC RX IP 258 OP 636: Performed by: EMERGENCY MEDICINE

## 2023-10-14 PROCEDURE — 83690 ASSAY OF LIPASE: CPT | Performed by: EMERGENCY MEDICINE

## 2023-10-14 PROCEDURE — 71045 X-RAY EXAM CHEST 1 VIEW: CPT

## 2023-10-14 PROCEDURE — 96375 TX/PRO/DX INJ NEW DRUG ADDON: CPT

## 2023-10-14 PROCEDURE — 96366 THER/PROPH/DIAG IV INF ADDON: CPT

## 2023-10-14 PROCEDURE — 74177 CT ABD & PELVIS W/CONTRAST: CPT

## 2023-10-14 PROCEDURE — 87637 SARSCOV2&INF A&B&RSV AMP PRB: CPT | Performed by: EMERGENCY MEDICINE

## 2023-10-14 PROCEDURE — 96365 THER/PROPH/DIAG IV INF INIT: CPT | Mod: 59

## 2023-10-14 RX ORDER — CEFAZOLIN SODIUM 1 G/50ML
1250 SOLUTION INTRAVENOUS ONCE
Status: COMPLETED | OUTPATIENT
Start: 2023-10-14 | End: 2023-10-15

## 2023-10-14 RX ORDER — SODIUM CHLORIDE 9 MG/ML
INJECTION, SOLUTION INTRAVENOUS CONTINUOUS
Status: DISCONTINUED | OUTPATIENT
Start: 2023-10-14 | End: 2023-10-16

## 2023-10-14 RX ORDER — MORPHINE SULFATE 4 MG/ML
4 INJECTION, SOLUTION INTRAMUSCULAR; INTRAVENOUS ONCE
Status: COMPLETED | OUTPATIENT
Start: 2023-10-14 | End: 2023-10-14

## 2023-10-14 RX ORDER — ONDANSETRON 2 MG/ML
4 INJECTION INTRAMUSCULAR; INTRAVENOUS ONCE
Status: COMPLETED | OUTPATIENT
Start: 2023-10-14 | End: 2023-10-14

## 2023-10-14 RX ORDER — IOPAMIDOL 755 MG/ML
80 INJECTION, SOLUTION INTRAVASCULAR ONCE
Status: COMPLETED | OUTPATIENT
Start: 2023-10-14 | End: 2023-10-14

## 2023-10-14 RX ORDER — CEFEPIME HYDROCHLORIDE 1 G/1
1 INJECTION, POWDER, FOR SOLUTION INTRAMUSCULAR; INTRAVENOUS ONCE
Status: COMPLETED | OUTPATIENT
Start: 2023-10-14 | End: 2023-10-14

## 2023-10-14 RX ADMIN — SODIUM CHLORIDE: 9 INJECTION, SOLUTION INTRAVENOUS at 22:55

## 2023-10-14 RX ADMIN — CEFEPIME HYDROCHLORIDE 1 G: 1 INJECTION, POWDER, FOR SOLUTION INTRAMUSCULAR; INTRAVENOUS at 21:26

## 2023-10-14 RX ADMIN — VANCOMYCIN HYDROCHLORIDE 1250 MG: 500 INJECTION, POWDER, LYOPHILIZED, FOR SOLUTION INTRAVENOUS at 21:56

## 2023-10-14 RX ADMIN — IOPAMIDOL 75 ML: 755 INJECTION, SOLUTION INTRAVENOUS at 22:48

## 2023-10-14 RX ADMIN — ONDANSETRON 4 MG: 2 INJECTION INTRAMUSCULAR; INTRAVENOUS at 20:39

## 2023-10-14 RX ADMIN — SODIUM CHLORIDE 1000 ML: 9 INJECTION, SOLUTION INTRAVENOUS at 21:20

## 2023-10-14 RX ADMIN — MORPHINE SULFATE 4 MG: 4 INJECTION, SOLUTION INTRAMUSCULAR; INTRAVENOUS at 20:40

## 2023-10-14 ASSESSMENT — ACTIVITIES OF DAILY LIVING (ADL)
ADLS_ACUITY_SCORE: 35
ADLS_ACUITY_SCORE: 35

## 2023-10-15 LAB
ALBUMIN SERPL BCG-MCNC: 3 G/DL (ref 3.5–5.2)
ALBUMIN UR-MCNC: 20 MG/DL
ALP SERPL-CCNC: 83 U/L (ref 35–104)
ALT SERPL W P-5'-P-CCNC: 6 U/L (ref 0–50)
ANION GAP SERPL CALCULATED.3IONS-SCNC: 15 MMOL/L (ref 7–15)
APPEARANCE UR: CLEAR
AST SERPL W P-5'-P-CCNC: 10 U/L (ref 0–45)
BACTERIA #/AREA URNS HPF: ABNORMAL /HPF
BASO+EOS+MONOS # BLD AUTO: ABNORMAL 10*3/UL
BASO+EOS+MONOS NFR BLD AUTO: ABNORMAL %
BASOPHILS # BLD AUTO: 0.1 10E3/UL (ref 0–0.2)
BASOPHILS NFR BLD AUTO: 0 %
BILIRUB SERPL-MCNC: 0.2 MG/DL
BILIRUB UR QL STRIP: NEGATIVE
BUN SERPL-MCNC: 22 MG/DL (ref 8–23)
CALCIUM SERPL-MCNC: 8 MG/DL (ref 8.8–10.2)
CHLORIDE SERPL-SCNC: 105 MMOL/L (ref 98–107)
COLOR UR AUTO: YELLOW
CREAT SERPL-MCNC: 0.86 MG/DL (ref 0.51–0.95)
DEPRECATED HCO3 PLAS-SCNC: 20 MMOL/L (ref 22–29)
EGFRCR SERPLBLD CKD-EPI 2021: 73 ML/MIN/1.73M2
EOSINOPHIL # BLD AUTO: 0.1 10E3/UL (ref 0–0.7)
EOSINOPHIL NFR BLD AUTO: 0 %
ERYTHROCYTE [DISTWIDTH] IN BLOOD BY AUTOMATED COUNT: 15.4 % (ref 10–15)
GLUCOSE SERPL-MCNC: 109 MG/DL (ref 70–99)
GLUCOSE UR STRIP-MCNC: NEGATIVE MG/DL
HCT VFR BLD AUTO: 27.8 % (ref 35–47)
HGB BLD-MCNC: 8.4 G/DL (ref 11.7–15.7)
HGB UR QL STRIP: NEGATIVE
HOLD SPECIMEN: NORMAL
HOLD SPECIMEN: NORMAL
HYALINE CASTS: 4 /LPF
IMM GRANULOCYTES # BLD: 0.5 10E3/UL
IMM GRANULOCYTES NFR BLD: 3 %
KETONES UR STRIP-MCNC: 10 MG/DL
LACTATE SERPL-SCNC: 1.6 MMOL/L (ref 0.7–2)
LEUKOCYTE ESTERASE UR QL STRIP: NEGATIVE
LYMPHOCYTES # BLD AUTO: 1.1 10E3/UL (ref 0.8–5.3)
LYMPHOCYTES NFR BLD AUTO: 7 %
MCH RBC QN AUTO: 26 PG (ref 26.5–33)
MCHC RBC AUTO-ENTMCNC: 30.2 G/DL (ref 31.5–36.5)
MCV RBC AUTO: 86 FL (ref 78–100)
MONOCYTES # BLD AUTO: 0.7 10E3/UL (ref 0–1.3)
MONOCYTES NFR BLD AUTO: 5 %
MRSA DNA SPEC QL NAA+PROBE: NEGATIVE
MUCOUS THREADS #/AREA URNS LPF: PRESENT /LPF
NEUTROPHILS # BLD AUTO: 12.8 10E3/UL (ref 1.6–8.3)
NEUTROPHILS NFR BLD AUTO: 85 %
NITRATE UR QL: NEGATIVE
NRBC # BLD AUTO: 0 10E3/UL
NRBC BLD AUTO-RTO: 0 /100
PH UR STRIP: 5.5 [PH] (ref 5–7)
PLATELET # BLD AUTO: 532 10E3/UL (ref 150–450)
POTASSIUM SERPL-SCNC: 4.4 MMOL/L (ref 3.4–5.3)
PROT SERPL-MCNC: 6.1 G/DL (ref 6.4–8.3)
RBC # BLD AUTO: 3.23 10E6/UL (ref 3.8–5.2)
RBC URINE: 1 /HPF
SA TARGET DNA: NEGATIVE
SODIUM SERPL-SCNC: 140 MMOL/L (ref 135–145)
SP GR UR STRIP: 1.02 (ref 1–1.03)
SQUAMOUS EPITHELIAL: <1 /HPF
TSH SERPL DL<=0.005 MIU/L-ACNC: 0.77 UIU/ML (ref 0.3–4.2)
UROBILINOGEN UR STRIP-MCNC: <2 MG/DL
WBC # BLD AUTO: 15.2 10E3/UL (ref 4–11)
WBC URINE: 1 /HPF

## 2023-10-15 PROCEDURE — 83605 ASSAY OF LACTIC ACID: CPT | Performed by: FAMILY MEDICINE

## 2023-10-15 PROCEDURE — 120N000001 HC R&B MED SURG/OB

## 2023-10-15 PROCEDURE — 250N000011 HC RX IP 250 OP 636: Mod: JZ | Performed by: EMERGENCY MEDICINE

## 2023-10-15 PROCEDURE — 87641 MR-STAPH DNA AMP PROBE: CPT | Performed by: STUDENT IN AN ORGANIZED HEALTH CARE EDUCATION/TRAINING PROGRAM

## 2023-10-15 PROCEDURE — 258N000003 HC RX IP 258 OP 636: Performed by: STUDENT IN AN ORGANIZED HEALTH CARE EDUCATION/TRAINING PROGRAM

## 2023-10-15 PROCEDURE — 84443 ASSAY THYROID STIM HORMONE: CPT | Performed by: PHYSICIAN ASSISTANT

## 2023-10-15 PROCEDURE — 80053 COMPREHEN METABOLIC PANEL: CPT | Performed by: FAMILY MEDICINE

## 2023-10-15 PROCEDURE — C9113 INJ PANTOPRAZOLE SODIUM, VIA: HCPCS | Mod: JZ | Performed by: FAMILY MEDICINE

## 2023-10-15 PROCEDURE — 36415 COLL VENOUS BLD VENIPUNCTURE: CPT | Performed by: FAMILY MEDICINE

## 2023-10-15 PROCEDURE — 250N000013 HC RX MED GY IP 250 OP 250 PS 637: Performed by: FAMILY MEDICINE

## 2023-10-15 PROCEDURE — C9113 INJ PANTOPRAZOLE SODIUM, VIA: HCPCS | Mod: JZ | Performed by: EMERGENCY MEDICINE

## 2023-10-15 PROCEDURE — 250N000013 HC RX MED GY IP 250 OP 250 PS 637: Performed by: STUDENT IN AN ORGANIZED HEALTH CARE EDUCATION/TRAINING PROGRAM

## 2023-10-15 PROCEDURE — 258N000003 HC RX IP 258 OP 636: Performed by: FAMILY MEDICINE

## 2023-10-15 PROCEDURE — 85025 COMPLETE CBC W/AUTO DIFF WBC: CPT | Performed by: FAMILY MEDICINE

## 2023-10-15 PROCEDURE — 99223 1ST HOSP IP/OBS HIGH 75: CPT | Performed by: FAMILY MEDICINE

## 2023-10-15 PROCEDURE — 81001 URINALYSIS AUTO W/SCOPE: CPT | Performed by: EMERGENCY MEDICINE

## 2023-10-15 PROCEDURE — 250N000011 HC RX IP 250 OP 636: Mod: JZ | Performed by: FAMILY MEDICINE

## 2023-10-15 RX ORDER — CEFEPIME HYDROCHLORIDE 2 G/1
2 INJECTION, POWDER, FOR SOLUTION INTRAVENOUS EVERY 12 HOURS
Status: DISCONTINUED | OUTPATIENT
Start: 2023-10-15 | End: 2023-10-16

## 2023-10-15 RX ORDER — AMOXAPINE 50 MG/1
50 TABLET ORAL AT BEDTIME
COMMUNITY
End: 2023-11-14

## 2023-10-15 RX ORDER — CEFEPIME HYDROCHLORIDE 1 G/1
1 INJECTION, POWDER, FOR SOLUTION INTRAMUSCULAR; INTRAVENOUS ONCE
Status: COMPLETED | OUTPATIENT
Start: 2023-10-15 | End: 2023-10-15

## 2023-10-15 RX ORDER — LEVOTHYROXINE SODIUM 112 UG/1
112 TABLET ORAL DAILY
Status: DISCONTINUED | OUTPATIENT
Start: 2023-10-15 | End: 2023-10-24 | Stop reason: HOSPADM

## 2023-10-15 RX ORDER — AMLODIPINE BESYLATE 5 MG/1
10 TABLET ORAL DAILY
Status: DISCONTINUED | OUTPATIENT
Start: 2023-10-15 | End: 2023-10-19

## 2023-10-15 RX ORDER — SODIUM CHLORIDE 9 MG/ML
INJECTION, SOLUTION INTRAVENOUS CONTINUOUS
Status: DISCONTINUED | OUTPATIENT
Start: 2023-10-15 | End: 2023-10-15

## 2023-10-15 RX ORDER — ONDANSETRON 4 MG/1
4 TABLET, ORALLY DISINTEGRATING ORAL EVERY 6 HOURS PRN
Status: DISCONTINUED | OUTPATIENT
Start: 2023-10-15 | End: 2023-10-17

## 2023-10-15 RX ORDER — CLONIDINE HYDROCHLORIDE 0.1 MG/1
0.2 TABLET ORAL DAILY
Status: DISCONTINUED | OUTPATIENT
Start: 2023-10-15 | End: 2023-10-24 | Stop reason: HOSPADM

## 2023-10-15 RX ORDER — ESCITALOPRAM OXALATE 20 MG/1
20 TABLET ORAL DAILY
Status: DISCONTINUED | OUTPATIENT
Start: 2023-10-15 | End: 2023-10-23

## 2023-10-15 RX ORDER — ATENOLOL 25 MG/1
100 TABLET ORAL DAILY
Status: DISCONTINUED | OUTPATIENT
Start: 2023-10-15 | End: 2023-10-24 | Stop reason: HOSPADM

## 2023-10-15 RX ORDER — POTASSIUM CHLORIDE 750 MG/1
10 TABLET, EXTENDED RELEASE ORAL DAILY
Status: DISCONTINUED | OUTPATIENT
Start: 2023-10-15 | End: 2023-10-24 | Stop reason: HOSPADM

## 2023-10-15 RX ORDER — LISINOPRIL 20 MG/1
20 TABLET ORAL 2 TIMES DAILY
Status: DISCONTINUED | OUTPATIENT
Start: 2023-10-15 | End: 2023-10-24 | Stop reason: HOSPADM

## 2023-10-15 RX ORDER — LIDOCAINE 40 MG/G
CREAM TOPICAL
Status: DISCONTINUED | OUTPATIENT
Start: 2023-10-15 | End: 2023-10-24 | Stop reason: HOSPADM

## 2023-10-15 RX ORDER — VANCOMYCIN HYDROCHLORIDE 500 MG/10ML
500 INJECTION, POWDER, LYOPHILIZED, FOR SOLUTION INTRAVENOUS EVERY 12 HOURS
Status: DISCONTINUED | OUTPATIENT
Start: 2023-10-15 | End: 2023-10-16

## 2023-10-15 RX ORDER — AMOXAPINE 50 MG/1
50 TABLET ORAL AT BEDTIME
Status: DISCONTINUED | OUTPATIENT
Start: 2023-10-15 | End: 2023-10-15

## 2023-10-15 RX ORDER — ONDANSETRON 2 MG/ML
4 INJECTION INTRAMUSCULAR; INTRAVENOUS EVERY 6 HOURS PRN
Status: DISCONTINUED | OUTPATIENT
Start: 2023-10-15 | End: 2023-10-17

## 2023-10-15 RX ADMIN — LEVOTHYROXINE SODIUM 112 MCG: 0.11 TABLET ORAL at 12:28

## 2023-10-15 RX ADMIN — CEFEPIME HYDROCHLORIDE 1 G: 1 INJECTION, POWDER, FOR SOLUTION INTRAMUSCULAR; INTRAVENOUS at 01:18

## 2023-10-15 RX ADMIN — VANCOMYCIN HYDROCHLORIDE 500 MG: 500 INJECTION, POWDER, LYOPHILIZED, FOR SOLUTION INTRAVENOUS at 16:22

## 2023-10-15 RX ADMIN — CEFEPIME HYDROCHLORIDE 2 G: 2 INJECTION, POWDER, FOR SOLUTION INTRAVENOUS at 21:05

## 2023-10-15 RX ADMIN — POTASSIUM CHLORIDE 10 MEQ: 750 TABLET, EXTENDED RELEASE ORAL at 12:28

## 2023-10-15 RX ADMIN — ESCITALOPRAM OXALATE 20 MG: 20 TABLET ORAL at 12:28

## 2023-10-15 RX ADMIN — PANTOPRAZOLE SODIUM 40 MG: 40 INJECTION, POWDER, FOR SOLUTION INTRAVENOUS at 07:41

## 2023-10-15 RX ADMIN — PANTOPRAZOLE SODIUM 40 MG: 40 INJECTION, POWDER, FOR SOLUTION INTRAVENOUS at 01:18

## 2023-10-15 RX ADMIN — SODIUM CHLORIDE: 9 INJECTION, SOLUTION INTRAVENOUS at 21:09

## 2023-10-15 RX ADMIN — LISINOPRIL 20 MG: 20 TABLET ORAL at 21:08

## 2023-10-15 RX ADMIN — ATENOLOL 100 MG: 25 TABLET ORAL at 12:28

## 2023-10-15 RX ADMIN — SODIUM CHLORIDE: 9 INJECTION, SOLUTION INTRAVENOUS at 08:47

## 2023-10-15 RX ADMIN — CLONIDINE HYDROCHLORIDE 0.2 MG: 0.1 TABLET ORAL at 12:28

## 2023-10-15 RX ADMIN — SODIUM CHLORIDE: 9 INJECTION, SOLUTION INTRAVENOUS at 01:09

## 2023-10-15 RX ADMIN — PERMETHRIN: 1 LOTION TOPICAL at 03:33

## 2023-10-15 RX ADMIN — VANCOMYCIN HYDROCHLORIDE 500 MG: 500 INJECTION, POWDER, LYOPHILIZED, FOR SOLUTION INTRAVENOUS at 06:07

## 2023-10-15 RX ADMIN — PANTOPRAZOLE SODIUM 40 MG: 40 INJECTION, POWDER, FOR SOLUTION INTRAVENOUS at 21:05

## 2023-10-15 RX ADMIN — AMITRIPTYLINE HYDROCHLORIDE 50 MG: 25 TABLET, FILM COATED ORAL at 21:12

## 2023-10-15 RX ADMIN — CEFEPIME HYDROCHLORIDE 2 G: 2 INJECTION, POWDER, FOR SOLUTION INTRAVENOUS at 11:20

## 2023-10-15 RX ADMIN — LISINOPRIL 20 MG: 20 TABLET ORAL at 12:28

## 2023-10-15 ASSESSMENT — ACTIVITIES OF DAILY LIVING (ADL)
ADLS_ACUITY_SCORE: 20
ADLS_ACUITY_SCORE: 41
ADLS_ACUITY_SCORE: 20
ADLS_ACUITY_SCORE: 41
ADLS_ACUITY_SCORE: 35
ADLS_ACUITY_SCORE: 41
ADLS_ACUITY_SCORE: 41
ADLS_ACUITY_SCORE: 35
ADLS_ACUITY_SCORE: 41
ADLS_ACUITY_SCORE: 37

## 2023-10-15 NOTE — CONSULTS
GI CONSULT NOTE      Name: Keyanna Palafox    Medical Record #: [id number]    YOB: 1955    Date: 10/15/2023    CC: We were consulted by Cedric Toscano MD to see Keyanna Palafox in regards to nausea, vomiting, CT showing gastric ulcer, and anemia.     HPI: This is a 68 year old female with a history of HTN, hypothyroidism and MDD who was admitted on 10/15/2023 after being brought in by paramedics due to sepsis of unknown source, abdominal pain, nausea and vomiting.  She reports nausea, vomiting, abdominal pain for the past 6 weeks.  Denies any hematemesis or coffee-ground emesis.  Hemoglobin 8.7 on admission versus 12.3 on May 31, 2023. Lactic acid 5.1 - which has not corrected. Denies any red or black-colored stools.  Contrast-enhanced CT scan showed wall thickening of the gastric body with ulceration and some mildly prominent surrounding lymph nodes.  She has difficulty giving a history and has difficulty stating what year it is  and difficulty recalling details such as when her  passed away.  Reports former tobacco use and quit smoking 5 years ago.  Denies history of heavy alcohol use.  Paramedics noted her house was very unkempt and patient was covered in urine and feces.  Staff here noted flea infestation while combing her hair.  Labs show initial troponin mildly up at 15 on admit though came down to 12 (normal).  No ST changes on EKG; is on telemetry.  Denies having had EGD nor colonoscopy in the past.    Past medical history  HTN, hypothyroidism, MDD.    Family history - neg for GI malignancy that she is aware of.   Family History   Problem Relation Age of Onset    Cerebral aneurysm Mother 83.00    Acute Myocardial Infarction Father          from the 8th MI    Alcoholism Daughter         Social history  Social History     Tobacco Use    Smoking status: Every Day     Types: Cigarettes    Smokeless tobacco: Former       Medications:   Current Outpatient Medications  "  Medication Sig Dispense Refill    amLODIPine (NORVASC) 10 MG tablet TAKE 1 TABLET BY MOUTH DAILY 90 tablet 3    amoxapine (ASENDIN) 50 MG tablet Take 50 mg by mouth at bedtime      atenolol (TENORMIN) 100 MG tablet TAKE 1 TABLET BY MOUTH DAILY 90 tablet 3    cloNIDine (CATAPRES) 0.2 MG tablet TAKE 1 TABLET BY MOUTH DAILY 90 tablet 3    escitalopram (LEXAPRO) 20 MG tablet TAKE 1 TABLET BY MOUTH DAILY 90 tablet 3    indomethacin (INDOCIN) 25 MG capsule TAKE 1 CAPSULE BY MOUTH 3 TIMES  DAILY AS NEEDED FOR HEADACHE(S) 270 capsule 3    levothyroxine (SYNTHROID/LEVOTHROID) 112 MCG tablet TAKE 1 TABLET BY MOUTH DAILY 90 tablet 3    lisinopril (ZESTRIL) 20 MG tablet Take 1 tablet (20 mg) by mouth 2 times daily 180 tablet 3    potassium chloride ER (KLOR-CON M) 10 MEQ CR tablet TAKE 1 TABLET BY MOUTH DAILY 90 tablet 3    tiZANidine (ZANAFLEX) 4 MG tablet Take 1 tablet (4 mg) by mouth 2 times daily as needed for muscle spasms (headache) 180 tablet 1        Allergies:    Allergies   Allergen Reactions    Ace Inhibitors Cough     NOTE:  patient doesn't recall this reaction; she is currently on Lisinopril without problems!!      Ampicillin Itching    Sulfa (Sulfonamide Antibiotics) [Sulfa Antibiotics] Nausea      REVIEW OF SYSTEMS (ROS): Review of systems is as per HPI.  Remainder of complete review of systems is negative.      PHYSICAL EXAMINATION:      /70   Pulse 72   Temp 97.8  F (36.6  C) (Axillary)   Resp 16   Ht 1.575 m (5' 2\")   Wt 53.1 kg (117 lb)   SpO2 100%   BMI 21.40 kg/m    GEN: Well developed, well nourished 68 year old female in no acute distress.  HEENT: sclera anicteric, moist mucous membranes, neck soft and supple.  LYMPH: No cervical lymphadenopathy  PULM: lungs clear to auscultation bilaterally.  CARDIO: Regular rate and rhythm  GI: Non-distended.  Bowel sounds positive.  Soft.  Non-tender to palpation.  No guarding.  EXT: warm, no lower extremity edema  NEURO: Confused.  No asterixis flap.  " Takes time to process questions and given an answer.   PSYCH: Mental status appropriate, mood and affect normal.      LABS and IMAGING  Recent Labs   Lab 10/15/23  0713 10/14/23  1950   WBC 15.2* 14.6*   RBC 3.23* 3.34*   HGB 8.4* 8.7*   HCT 27.8* 28.7*   MCV 86 86   MCH 26.0* 26.0*   MCHC 30.2* 30.3*   RDW 15.4* 15.4*   * 603*      Recent Labs   Lab 10/15/23  0713 10/14/23  1950    138   CO2 20* 17*   BUN 22.0 29.7*     Recent Labs   Lab 10/15/23  0713 10/14/23  1950   ALKPHOS 83 95   AST 10 11   ALT 6 7       CT Abdomen Pelvis w Contrast    Result Date: 10/14/2023  EXAM: CT ABDOMEN PELVIS W CONTRAST LOCATION: [facility] DATE: 10/14/2023 INDICATION: Abdominal pain, vomiting. COMPARISON: None. TECHNIQUE: CT scan of the abdomen and pelvis was performed following injection of IV contrast.   IMPRESSION: 1.  Wall thickening distal gastric body with ulceration anteriorly. There are a few mildly prominent surrounding lymph nodes. Diagnostic considerations include carcinoma versus gastritis. 2.  Tiny non obstructing stone within the left kidney. No ureteric stone or hydronephrosis. 3.  Mild cylindrical bronchiectasis in the lung bases and lingula. Mild consolidation in the lingula.     ASSESSMENT  This is a 69 yo female admitted on 10/15/2023 due to sepsis of unknown source, abdominal pain, nausea, vomiting and anemia.     1. Abd pain, nausea, vomiting - Reports symptoms present for 6 weeks.  CT with contrast showing thickened gastric body with ulceration and mildly prominent surrounding adenopathy.  Further eval with EGD once mental status improves.   -LFT's and imaging not suggestive of underlying hepatobiliary process.  2. Anemia - Hgb 8.7 on admit versus 12.3 on 5/31/2023.  3. Altered mental status - will be sure to check thyroid.  No asterixis flap on exam but also not very cooperative when checking. Flu and COVID neg. Cultures pending.   4. Leukocytosis  - 15K.  On Cefepime.  Work-up in progress.  5. Flea infestation - Permethrin.    Patient Active Problem List   Diagnosis    Viral Syndrome    Acute Bronchitis    Muscle Aches, Generalized (Myalgias)    Pain During Urination (Dysuria)    Closed Fracture Of The Distal End Of The Radius    Acute Sinusitis    Allergies    Abnormal Weight Loss    Hypothyroidism    Migraine Headache    Impingement Of The Right Shoulder    Hypertension    Internal Derangement Of Left Knee    Syncope    Moderate episode of recurrent major depressive disorder (H)    Periumbilical abdominal pain    Anemia, unspecified type     PLAN  1. PPI BID.  2. Antiemetics PRN.  3. EGD once further work-up has been done for sepsis, leukocytosis, and altered mental status has improved.   4. Blood culture pending.   5. Check thyroid studies.     A total of 50 min was spent on today's care. This included chart review, eval and examination of the patient, discussion with Dr Hodgson, and formulation of assessment and plan.   Thank you for asking to consult on this patient.    Dane Palafox PA-C   10/15/2023 2:27 PM  [facility]  [phone]

## 2023-10-15 NOTE — PHARMACY-VANCOMYCIN DOSING SERVICE
Pharmacy Vancomycin Initial Note  Date of Service October 15, 2023  Patient's  1955  68 year old, female    Indication: Sepsis    Current estimated CrCl = Estimated Creatinine Clearance: 39.4 mL/min (A) (based on SCr of 1.13 mg/dL (H)).    Creatinine for last 3 days  10/14/2023:  7:50 PM Creatinine 1.13 mg/dL    Recent Vancomycin Level(s) for last 3 days  No results found for requested labs within last 3 days.      Vancomycin IV Administrations (past 72 hours)                     vancomycin (VANCOCIN) 1,250 mg in sodium chloride 0.9 % 250 mL intermittent infusion (mg) 1,250 mg New Bag 10/14/23 2156                    Nephrotoxins and other renal medications (From now, onward)      None            Contrast Orders - past 72 hours (72h ago, onward)      Start     Dose/Rate Route Frequency Stop    10/14/23 2300  iopamidol (ISOVUE-370) solution 80 mL         80 mL Intravenous ONCE 10/14/23 2248            Ins[employer] Prediction of Planned Initial Vancomycin Regimen  Loading dose: 1250 mg IV on 10/14/23 at 21:56  Regimen: 500 mg IV every 12 hours.  Start time: 05:00 on 10/15/2023  Exposure target: AUC24 (range)400-600 mg/L.hr   AUC24,ss: 512 mg/L.hr  Probability of AUC24 > 400: 77 %  Ctrough,ss: 17.8 mg/L  Probability of Ctrough,ss > 20: 38 %  Probability of nephrotoxicity (Lodise JESUS ): 14 %        Plan:  Start vancomycin  500 mg IV q12h. This regimen is timed to start at 05:00, ~7 hours after loading dose. This timing will minimize subtherapeutic intervals and achieve steady-state faster  Vancomycin monitoring method: AUC  Vancomycin therapeutic monitoring goal: 400-600 mg*h/L  Pharmacy will check vancomycin levels as appropriate in 1-3 Days.    Serum creatinine levels will be ordered daily for the first week of therapy and at least twice weekly for subsequent weeks.      Norma Hdz, [facility]

## 2023-10-15 NOTE — INTERIM SUMMARY
Brief hospitalist note  68-year-old female patient admitted for possible sepsis of unknown source.  Is getting IV antibiotics.  lactate has trended down.  Vital signs are WNL.  GI consult requested for concern with gastritis versus carcinoma.  Patient was in ED boarding waiting for bed assignment.  She states she is feeling relatively better now.  But admits having easy fatigability.    Plan  Continue management as per admitting physician    Noé Yu MD

## 2023-10-15 NOTE — PHARMACY-VANCOMYCIN DOSING SERVICE
Pharmacy Vancomycin Initial Note  Date of Service 2023  Patient's  1955  68 year old, female    Indication: Sepsis    Current estimated CrCl = Estimated Creatinine Clearance: 39.4 mL/min (A) (based on SCr of 1.13 mg/dL (H)).    Creatinine for last 3 days  10/14/2023:  7:50 PM Creatinine 1.13 mg/dL    Recent Vancomycin Level(s) for last 3 days  No results found for requested labs within last 3 days.      Vancomycin IV Administrations (past 72 hours)        No vancomycin orders with administrations in past 72 hours.                    Nephrotoxins and other renal medications (From now, onward)      Start     Dose/Rate Route Frequency Ordered Stop    10/14/23 2130  vancomycin (VANCOCIN) 1,250 mg in sodium chloride 0.9 % 250 mL intermittent infusion         1,250 mg  over 90 Minutes Intravenous ONCE 10/14/23 2121              Contrast Orders - past 72 hours (72h ago, onward)      None            Plan:  Start vancomycin  1250 mg IV once for loading dose (23.5 mg/kg)   This is a one time order for ED only. Please consult pharmacy again if vancomycin to be continued on admission.     Fawn Calixto, PharmD

## 2023-10-15 NOTE — ED TRIAGE NOTES
Pt arrived via [facility] ems with c/o abdominal pain, n/v and generalized weakness for past few weeks. Pt lives at home alone, per EMS pt's living space, belongsings and self is covered in feces, urine and what appears to be fleas. Pt denies dark brown or black emesis or stool.   Pt is alert and oriented times 4.      Triage Assessment (Adult)       Row Name 10/14/23 1942          Triage Assessment    Airway WDL WDL        Respiratory WDL    Respiratory WDL WDL        Skin Circulation/Temperature WDL    Skin Circulation/Temperature WDL WDL        Cardiac WDL    Cardiac WDL WDL        Peripheral/Neurovascular WDL    Peripheral Neurovascular WDL WDL        Cognitive/Neuro/Behavioral WDL    Cognitive/Neuro/Behavioral WDL WDL

## 2023-10-15 NOTE — ED NOTES
Hair washed with shampoo, shampoo cap and permethrin lotion rinse according to directions on bottle and rinsed thoroughly. Hair combed through and extracted several fleas. Complete bed change and complete bed bath given. New gown and linens provided.   Pt remains on monitor.

## 2023-10-15 NOTE — ED NOTES
Bed: JNED-  Expected date: 10/14/23  Expected time: 7:26 PM  Means of arrival: Ambulance  Comments:  SP24- 68yof n/v house and pt covered in urine and feces

## 2023-10-15 NOTE — ED NOTES
Patient unable to void, reported she felt like she had to go but could not. Bladder scanned for 673 mL. MD paged with update.

## 2023-10-15 NOTE — PHARMACY-ADMISSION MEDICATION HISTORY
"Pharmacist Admission Medication History    Admission medication history is complete. The information provided in this note is only as accurate as the sources available at the time of the update.    Information Source(s): Patient and CareEverywhere/SureScripts via in-person    Pertinent Information: Patient was a little confused and unable to complete full interview. Did state that she last took meds \"day before yesterday\" and that she has two pill boxes for AM and PM that she sets up. Could not recall which medications she took at each time of day but stated she follows what is on the bottles and takes most in the AM. Medication list and timing of administrations based on clinic note from 5/31/2023.    Changes made to PTA medication list:  Added: None  Deleted: None  Changed: None    Medication Affordability:  Not including over the counter (OTC) medications, was there a time in the past 3 months when you did not take your medications as prescribed because of cost?: No    Allergies reviewed with patient and updates made in EHR: unable to assess    Medication History Completed By: Liz Garcia [facility] 10/15/2023 8:10 AM    PTA Med List   Medication Sig Last Dose    amLODIPine (NORVASC) 10 MG tablet TAKE 1 TABLET BY MOUTH DAILY 10/13/2023 at AM    amoxapine (ASENDIN) 50 MG tablet Take 50 mg by mouth at bedtime 10/13/2023 at PM    atenolol (TENORMIN) 100 MG tablet TAKE 1 TABLET BY MOUTH DAILY 10/13/2023 at AM    cloNIDine (CATAPRES) 0.2 MG tablet TAKE 1 TABLET BY MOUTH DAILY 10/13/2023 at AM    escitalopram (LEXAPRO) 20 MG tablet TAKE 1 TABLET BY MOUTH DAILY 10/13/2023 at AM    indomethacin (INDOCIN) 25 MG capsule TAKE 1 CAPSULE BY MOUTH 3 TIMES  DAILY AS NEEDED FOR HEADACHE(S) Unknown at PRN    levothyroxine (SYNTHROID/LEVOTHROID) 112 MCG tablet TAKE 1 TABLET BY MOUTH DAILY 10/13/23 at AM    lisinopril (ZESTRIL) 20 MG tablet Take 1 tablet (20 mg) by mouth 2 times daily 10/13/2023 at PM    potassium chloride ER " (KLOR-CON M) 10 MEQ CR tablet TAKE 1 TABLET BY MOUTH DAILY 10/13/2023 at PM    tiZANidine (ZANAFLEX) 4 MG tablet Take 1 tablet (4 mg) by mouth 2 times daily as needed for muscle spasms (headache) Unknown at PRN

## 2023-10-15 NOTE — ED NOTES
"[facility] ED Handoff Report    ED Chief Complaint: Abdominal pain, N/V/D    ED Diagnosis:  (R10.33) Periumbilical abdominal pain  Comment:   Plan: GI consult     (D64.9) Anemia, unspecified type  Comment:   Plan: monitor        PMH:  History reviewed. No pertinent past medical history.     Code Status:  Full Code     Falls Risk: Yes Band: Applied    Current Living Situation/Residence: lives alone     Elimination Status: Continent: indwelling catheter     Activity Level: Total assist/lift    Patients Preferred Language:  English     Needed: No    Vital Signs:  /58   Pulse 72   Temp 97.8  F (36.6  C) (Axillary)   Resp 16   Ht 1.575 m (5' 2\")   Wt 53.1 kg (117 lb)   SpO2 99%   BMI 21.40 kg/m       Cardiac Rhythm: SR 80s    Pain Score: 0/10    Is the Patient Confused:  No    Last Food or Drink: 10/15/23 at 1200    Focused Assessment:  Patient is drowsy, opens eyes spontaneously but states she is tired and just wants to sleep. A/O x3, unsure of what happened yesterday to land her in ER. Very weak in all extremities, but rolls side to side independently. PT did not see her today. Desats 88% when deep asleep, 2L NC. Denies cough. LS dim, clear. Patient with poor appetite. A few sips at lunch. Unable to void today, villarreal placed. Pale. Lice treatment provided for fleas overnight and patient bathed- various scabs and bruising noted. What looks like flea bites noted to arms, back, scalp. NS @ 100, IV vanco running.    Tests Performed: Done: Labs and Imaging    Treatments Provided:  IVF, antibiotics, villarreal.     Family Dynamics/Concerns: No Grandson Juan did wellness check, called EMS. Phone number [phone]    Family Updated On Visitor Policy: No    Plan of Care Communicated to Family: No    Who Was Updated about Plan of Care: patient. No family present or available for update.     Belongings Checklist Done and Signed by Patient: Yes    Medications sent with patient: none     Covid: " asymptomatic , n/a    Additional Information:     RN: La Gabriel RN 10/15/2023 4:47 PM

## 2023-10-15 NOTE — H&P
[facility]    History and Physical - Hospitalist Service       Date of Admission:  10/15/2023    Assessment & Plan      Keyanna Palafox is a 68 year old female with PMH significant for HTN, hypothyroidism and MDD who is admitted on 10/15/2023 due to Sepsis of unknown source, abdominal pain, nausea and vomiting.    # Sepsis of Unknown Source- Admit patient. Lactic= 5.1-->2.5. WBC=14.6. AMS/lethargy. Urinalysis pending at time of admission. CXR no PNA. S/p Cefepime and Vancomycin in ED. Follow up Urinalysis and blood cultures. Monitor vitals closely. Will make further recommendations based on clinical course.    # Abdominal Pain- CT abd/pel report reviewed. Thickened gastric body with ulceration noted. Concern for gastritis vs carcinoma. GI consult ordered. PPI BID. Clear liquid diet.    # Nausea & Vomiting- CT abd/pel concerning for gastritis vs carcinoma. GI consult ordered. PRN Zofran. PPI BID. Continue IV hydration. Repeat electrolytes in a.m.    # Anemia- Hb=8.7. CT abd pel reveals thickened distal gastric body with ulceration anteriorly. Consult Gastroenterology to rule out GI bleed.    # Leukocytosis- continue empiric Cefepime for Sepsis of unknown source. Repeat CBC in a.m.    # DELMI- Likley prerenal. Continue IV hydration. Avoid nephrotoxic agents. Repeat renal function in a.m.    # Elevated troponin- Improved. Initial troponin= 15-->12. Possible demand ischemia vs DELMI. EKG reviewed, no ST changes appreciated. Continue telemetry monitoring.    # Flea infestation- Contact isolation. Trial of Permethrin lotion.        Diet:  CLD  DVT Prophylaxis: Pneumatic Compression Devices  Pina Catheter: Not present  Lines: None     Cardiac Monitoring: None  Code Status:  Full Code    Clinically Significant Risk Factors Present on Admission             # Anion Gap Metabolic Acidosis: Highest Anion Gap = 24 mmol/L in last 2 days, will monitor and treat as appropriate  # Hypoalbuminemia: Lowest  "albumin = 3.2 g/dL at 10/14/2023  7:50 PM, will monitor as appropriate     # Hypertension: Noted on problem list                 Disposition Plan Anticipate discharge home vs TCU in greater than 2 days.     Expected Discharge Date: 10/15/2023                  Adenike Swift MD  Hospitalist Service  [facility]  Securely message with [employer] (more info)  Text page via [employer] Paging/Directory     ______________________________________________________________________    Chief Complaint   Abdominal pain, nausea, vomiting    History is obtained from the patient, ED Physician and chart review.    History of Present Illness   Keyanna Palafox is a 68 year old female with PMH significant for HTN, hypothyroidism and MDD who  presents to ED due to abdominal pain, nausea and vomiting. Patient is lethargic and a poor historian at this time.  She says that she has been vomiting for 6 weeks. She reports the abdominal pain as generalized and says that it has been present \"for a few weeks\". She is unable to characterize or quantify the abdominal pain. She denies diarrhea, fever, chills, dysuria, chest pain or shortness of breath. Per chart review, patient arrived covered in feces, urine and fleas in her hair. Per ED Physician, EMS reported unsanitary home when she was picked up.      Past Medical History    HTN  Hypothyroidism  MDD    Past Surgical History   Past Surgical History:   Procedure Laterality Date    OPEN REDUCTION INTERNAL FIXATION FOOT Right     dislocation (fell off ladder)    TONSILLECTOMY      TUBAL LIGATION         Prior to Admission Medications   Prior to Admission Medications   Prescriptions Last Dose Informant Patient Reported? Taking?   amLODIPine (NORVASC) 10 MG tablet 10/12/2023 at unknown  No No   Sig: TAKE 1 TABLET BY MOUTH DAILY   Patient taking differently: Take 10 mg by mouth daily   amoxapine (ASENDIN) 50 MG tablet   No No   Sig: Take 1 tablet (50 mg) by mouth At Bedtime "   atenolol (TENORMIN) 100 MG tablet   No No   Sig: TAKE 1 TABLET BY MOUTH DAILY   cloNIDine (CATAPRES) 0.2 MG tablet   No No   Sig: TAKE 1 TABLET BY MOUTH DAILY   escitalopram (LEXAPRO) 20 MG tablet   No No   Sig: TAKE 1 TABLET BY MOUTH DAILY   indomethacin (INDOCIN) 25 MG capsule   No No   Sig: TAKE 1 CAPSULE BY MOUTH 3 TIMES  DAILY AS NEEDED FOR HEADACHE(S)   levothyroxine (SYNTHROID/LEVOTHROID) 112 MCG tablet   No No   Sig: TAKE 1 TABLET BY MOUTH DAILY   lisinopril (ZESTRIL) 20 MG tablet   No No   Sig: Take 1 tablet (20 mg) by mouth 2 times daily   potassium chloride ER (KLOR-CON M) 10 MEQ CR tablet   No No   Sig: TAKE 1 TABLET BY MOUTH DAILY   tiZANidine (ZANAFLEX) 4 MG tablet   No No   Sig: Take 1 tablet (4 mg) by mouth 2 times daily as needed for muscle spasms (headache)      Facility-Administered Medications: None        Review of Systems    The 10 point Review of Systems is negative other than noted in the HPI.    Social History   I have reviewed this patient's social history and updated it with pertinent information if needed.  Social History     Tobacco Use    Smoking status: Every Day     Types: Cigarettes    Smokeless tobacco: Former         Family History   I have reviewed this patient's family history and updated it with pertinent information if needed.  Family History   Problem Relation Age of Onset    Cerebral aneurysm Mother 83.00    Acute Myocardial Infarction Father          from the 8th MI    Alcoholism Daughter         Physical Exam   Vital Signs: Temp: 97.6  F (36.4  C) Temp src: Oral BP: 97/50 Pulse: 78   Resp: 15 SpO2: 100 %      Weight: 117 lbs 0 oz    General Appearance: Lethargic. Disheveled with soiled finger nails. Current wearing hospital surgical cap due to extensive fleas (per report). Poor dentition. Appears older than stated age.  Respiratory: Poor inspiratory effort. No wheezes or rhonchi. Diminished breath sounds in bases.  Cardiovascular: Regular rate, S1S2  GI: + BS, soft,  NT, ND  Skin: Scattered excoriation and punctate eschar.  Other: No pedal edema     Medical Decision Making       60 MINUTES SPENT BY ME on the date of service doing chart review, history, exam, documentation & further activities per the note.      Data     I have personally reviewed the following data over the past 24 hrs:    14.6 (H)  \   8.7 (L)   / 603 (H)     138 97 (L) 29.7 (H) /  140 (H)   4.1 17 (L) 1.13 (H) \     ALT: 7 AST: 11 AP: 95 TBILI: 0.2   ALB: 3.2 (L) TOT PROTEIN: 6.6 LIPASE: 20     Trop: 12 BNP: N/A     Procal: N/A CRP: N/A Lactic Acid: 2.5 (H)         Imaging results reviewed over the past 24 hrs:   Recent Results (from the past 24 hour(s))   XR Chest Port 1 View    Narrative    EXAM: XR CHEST PORT 1 VIEW  LOCATION: [facility]  DATE: 10/14/2023    INDICATION: cough elevated lactic acid evaluate for pneumonia  COMPARISON: 10/03/2016      Impression    IMPRESSION: Heart size within normal limits for portable technique. Given patient's leftward rotation. Calcified aortic arch. Mildly hyperinflated lungs. No focal airspace consolidation. No visible pneumothorax or pleural effusion. Osteopenia.   CT Abdomen Pelvis w Contrast    Narrative    EXAM: CT ABDOMEN PELVIS W CONTRAST  LOCATION: [facility]  DATE: 10/14/2023    INDICATION: Abdominal pain, vomiting.  COMPARISON: None.  TECHNIQUE: CT scan of the abdomen and pelvis was performed following injection of IV contrast. Multiplanar reformats were obtained. Dose reduction techniques were used.  CONTRAST: 75 mL Isovue 370.    FINDINGS:   LOWER CHEST: Cylindrical bronchiectasis in the lung bases. Mild bronchiectasis and consolidation within the lingula.    HEPATOBILIARY: Normal.    PANCREAS: Normal.    SPLEEN: Normal.    ADRENAL GLANDS: Normal.    KIDNEYS/BLADDER: Tiny nonobstructive stone left mid kidney.    BOWEL: Wall thickening distal gastric body with ulceration anteriorly. A few mildly prominent  surrounding lymph nodes. Diagnostic considerations include carcinoma versus gastritis. Normal appendix.    LYMPH NODES: Normal.    VASCULATURE: Unremarkable.    PELVIC ORGANS: Normal.    MUSCULOSKELETAL: Normal.      Impression    IMPRESSION:   1.  Wall thickening distal gastric body with ulceration anteriorly. There are a few mildly prominent surrounding lymph nodes. Diagnostic considerations include carcinoma versus gastritis.    2.  Tiny nonobstructing stone within the left kidney. No ureteric stone or hydronephrosis.    3.  Mild cylindrical bronchiectasis in the lung bases and lingula. Mild consolidation in the lingula.

## 2023-10-15 NOTE — ED PROVIDER NOTES
EMERGENCY DEPARTMENT NOTE     Name: Keyanna Palafox    Age/Sex: 68 year old female   MRN: [id number]   Evaluation Date & Time:  10/14/2023  7:40 PM    PCP:    Abby Joshi   ED Provider: Kartik Juarez D.O.       CHIEF COMPLAINT    Abdominal Pain and Nausea, Vomiting, & Diarrhea       DIAGNOSIS & DISPOSITION/MEDICAL DECISION MAKING   No diagnosis found.    Keyanna Palafox is a 68 year old female with relevant past history of hypertension and major depressive disorder who presents to the emergency department for evaluation of abdominal pain, nausea and vomiting.     Differential  diagnosis considered included but not limited to ACS, bowel obstruction or perforation, cholecystitis, choledocholithiasis, pancreatitis, obstructing urolithiasis, urinary tract infection, pyelonephritis, appendicitis or other referred cardio pulmonary process including pneumonia    Medical Decision Making  Patient on initial exam had periumbilical tenderness without guarding or peritoneal signs.  Laboratory evaluation demonstrated WBC 14.6 with lactic acid 5.1.  Hemoglobin was 8.7 decreased from most recent value of 12 hypochromic and microcytic.  LFTs and lipase were normal.  BUN was 30 with a creatinine of 1.1, glucose 140 with CO2 17 and anion gap 24  EKG was nonischemic, initial troponin 15 on repeat 12  Chest x-ray without acute process  CT of the abdomen pelvis showed no evidence of bowel obstruction or perforation.  She had thickening of the gastric area in terms of possible ulcer.  Nonobstructing stone in the kidney.  Mild infiltrate in the left lower lobe  COVID and influenza PCR negative, urinalysis without pyuria or bacteriuria.    Patient initially given antibiotic coverage with Zosyn and vancomycin.  After IV fluids lactic acids normalized and may have been secondary to volume depletion .  abdominal pain and vomiting may be secondary to gastric ulcer based on CT.  No evidence of perforation.  She does  "have anemia but denied symptoms of melena or hematemesis but may have slow GI bleeding.  Patient received IV Protonix.  Patient has not poor social situation and will admit for further evaluation.  Case was discussed with the hospitalist service.    Interventions: IV fluids, vancomycin, Zosyn, Protonix  Discharge Vital Signs:/56   Pulse 84   Temp 98  F (36.7  C) (Oral)   Resp 18   Ht 1.575 m (5' 2\")   Wt 53.1 kg (117 lb)   SpO2 99%   BMI 21.40 kg/m       DISPOSITION: Admit to [facility]    Diagnostic studies:  Imaging:  CT Abdomen Pelvis w Contrast   Final Result   IMPRESSION:    1.  Wall thickening distal gastric body with ulceration anteriorly. There are a few mildly prominent surrounding lymph nodes. Diagnostic considerations include carcinoma versus gastritis.      2.  Tiny nonobstructing stone within the left kidney. No ureteric stone or hydronephrosis.      3.  Mild cylindrical bronchiectasis in the lung bases and lingula. Mild consolidation in the lingula.      XR Chest Port 1 View   Final Result   IMPRESSION: Heart size within normal limits for portable technique. Given patient's leftward rotation. Calcified aortic arch. Mildly hyperinflated lungs. No focal airspace consolidation. No visible pneumothorax or pleural effusion. Osteopenia.         Lab:  Labs Ordered and Resulted from Time of ED Arrival to Time of ED Departure   BASIC METABOLIC PANEL - Abnormal       Result Value    Sodium 138      Potassium 4.1      Chloride 97 (*)     Carbon Dioxide (CO2) 17 (*)     Anion Gap 24 (*)     Urea Nitrogen 29.7 (*)     Creatinine 1.13 (*)     GFR Estimate 52 (*)     Calcium 9.0      Glucose 140 (*)    HEPATIC FUNCTION PANEL - Abnormal    Protein Total 6.6      Albumin 3.2 (*)     Bilirubin Total 0.2      Alkaline Phosphatase 95      AST 11      ALT 7      Bilirubin Direct <0.20     LACTIC ACID WHOLE BLOOD - Abnormal    Lactic Acid 5.1 (*)    TROPONIN T, HIGH SENSITIVITY - Abnormal    Troponin T, High " Sensitivity 15 (*)    CBC WITH PLATELETS AND DIFFERENTIAL - Abnormal    WBC Count 14.6 (*)     RBC Count 3.34 (*)     Hemoglobin 8.7 (*)     Hematocrit 28.7 (*)     MCV 86      MCH 26.0 (*)     MCHC 30.3 (*)     RDW 15.4 (*)     Platelet Count 603 (*)     % Neutrophils 85      % Lymphocytes 9      % Monocytes 4      Mids % (Monos, Eos, Basos)        % Eosinophils 0      % Basophils 0      % Immature Granulocytes 2      NRBCs per 100 WBC 0      Absolute Neutrophils 12.3 (*)     Absolute Lymphocytes 1.3      Absolute Monocytes 0.6      Mids Abs (Monos, Eos, Basos)        Absolute Eosinophils 0.0      Absolute Basophils 0.1      Absolute Immature Granulocytes 0.3      Absolute NRBCs 0.0     LACTIC ACID WHOLE BLOOD - Abnormal    Lactic Acid 2.5 (*)    LIPASE - Normal    Lipase 20     INFLUENZA A/B, RSV, & SARS-COV2 PCR - Normal    Influenza A PCR Negative      Influenza B PCR Negative      RSV PCR Negative      SARS CoV2 PCR Negative     TROPONIN T, HIGH SENSITIVITY - Normal    Troponin T, High Sensitivity 12     ROUTINE UA WITH MICROSCOPIC REFLEX TO CULTURE   BLOOD CULTURE   BLOOD CULTURE               Triage note reviewed:Pt arrived via [facility] ems with c/o abdominal pain, n/v and generalized weakness for past few weeks. Pt lives at home alone, per EMS pt's living space, belongsings and self is covered in feces, urine and what appears to be fleas. Pt denies dark brown or black emesis or stool.   Pt is alert and oriented times 4.      Triage Assessment (Adult)       Row Name 10/14/23 1942          Triage Assessment    Airway WDL WDL        Respiratory WDL    Respiratory WDL WDL        Skin Circulation/Temperature WDL    Skin Circulation/Temperature WDL WDL        Cardiac WDL    Cardiac WDL WDL        Peripheral/Neurovascular WDL    Peripheral Neurovascular WDL WDL        Cognitive/Neuro/Behavioral WDL    Cognitive/Neuro/Behavioral WDL WDL                         History:  Supplemental history from: Documented in chart,  if applicable and Other: Patient's nurse patient's grandson  External Record(s) reviewed: Primary care office visit May 31, 2023    Work Up:  Chart documentation includes differential considered and any EKGs or imaging independently interpreted by provider, where specified.  In additional to work up documented, I considered the following work up: Documented in chart, if applicable.    External consultation:  Discussion of management with another provider: Hospitalist    Complicating factors:  Care impacted by chronic illness: Hypertension and Mental Health  Care affected by social determinants of health: N/A    Disposition considerations: Admit.    At the conclusion of the encounter I discussed the results of all of the tests and the disposition. The questions were answered. The patient or family acknowledged understanding and was agreeable with the care plan.    TOTAL CRITICAL CARE TIME (EXCLUDING PROCEDURES): Not applicable    PROCEDURES:   None    EMERGENCY DEPARTMENT COURSE   8:10 PM I met with the patient to gather history and to perform my initial exam.  We discussed treatment options and the plan for care while in the Emergency Department.    ED INTERVENTIONS     Medications   sodium chloride 0.9 % infusion ( Intravenous $New Bag 10/14/23 2255)   vancomycin (VANCOCIN) 1,250 mg in sodium chloride 0.9 % 250 mL intermittent infusion (1,250 mg Intravenous $New Bag 10/14/23 2156)   morphine (PF) injection 4 mg (4 mg Intravenous $Given 10/14/23 2040)   ondansetron (ZOFRAN) injection 4 mg (4 mg Intravenous $Given 10/14/23 2039)   sodium chloride 0.9% BOLUS 1,000 mL (0 mLs Intravenous Stopped 10/14/23 2246)   ceFEPIme (MAXIPIME) 1 g vial to attach to  mL bag for ADULTS or NS 50 mL bag for PEDS (0 g Intravenous Stopped 10/14/23 2156)   iopamidol (ISOVUE-370) solution 80 mL (75 mLs Intravenous $Given 10/14/23 2248)       DISCHARGE MEDICATIONS        Review of your medicines        UNREVIEWED medicines. Ask your  doctor about these medicines        Dose / Directions   amLODIPine 10 MG tablet  Commonly known as: NORVASC  Used for: Essential hypertension      TAKE 1 TABLET BY MOUTH DAILY  Quantity: 90 tablet  Refills: 3     amoxapine 50 MG tablet  Commonly known as: ASENDIN  Used for: Moderate episode of recurrent major depressive disorder (H)      Dose: 50 mg  Take 1 tablet (50 mg) by mouth At Bedtime  Quantity: 90 tablet  Refills: 3     atenolol 100 MG tablet  Commonly known as: TENORMIN  Used for: Essential hypertension      TAKE 1 TABLET BY MOUTH DAILY  Quantity: 90 tablet  Refills: 3     cloNIDine 0.2 MG tablet  Commonly known as: CATAPRES  Used for: Essential hypertension      TAKE 1 TABLET BY MOUTH DAILY  Quantity: 90 tablet  Refills: 3     escitalopram 20 MG tablet  Commonly known as: LEXAPRO  Used for: Moderate episode of recurrent major depressive disorder (H)      TAKE 1 TABLET BY MOUTH DAILY  Quantity: 90 tablet  Refills: 3     indomethacin 25 MG capsule  Commonly known as: INDOCIN  Used for: Chronic migraine without aura without status migrainosus, not intractable      TAKE 1 CAPSULE BY MOUTH 3 TIMES  DAILY AS NEEDED FOR HEADACHE(S)  Quantity: 270 capsule  Refills: 3     levothyroxine 112 MCG tablet  Commonly known as: SYNTHROID/LEVOTHROID  Used for: Hypothyroidism, unspecified type      TAKE 1 TABLET BY MOUTH DAILY  Quantity: 90 tablet  Refills: 3     lisinopril 20 MG tablet  Commonly known as: ZESTRIL  Used for: Essential hypertension, Encounter for medication refill      Dose: 20 mg  Take 1 tablet (20 mg) by mouth 2 times daily  Quantity: 180 tablet  Refills: 3     potassium chloride ER 10 MEQ CR tablet  Commonly known as: KLOR-CON M  Used for: Essential hypertension      TAKE 1 TABLET BY MOUTH DAILY  Quantity: 90 tablet  Refills: 3     tiZANidine 4 MG tablet  Commonly known as: ZANAFLEX  Used for: Moderate episode of recurrent major depressive disorder (H)      Dose: 4 mg  Take 1 tablet (4 mg) by mouth 2 times  daily as needed for muscle spasms (headache)  Quantity: 180 tablet  Refills: 1                INFORMATION SOURCE AND LIMITATIONS    History/Exam limitations: N/A  Patient information was obtained from: the patient, patient's nurse  Use of : N/A    HISTORY OF PRESENT ILLNESS   Keyanna Palafox is a 68 year old year old female with a relevant past history of hypertension and major depressive disorder who presents to this ED by EMS for evaluation of abdominal pain, nausea and vomiting. Per the patient's nurse, the patient was found with fecal matter in her finger nails and what appeared to be fleas in her hair.     Per the patient, she has had abdominal pain, left-sided chest pain, a cough with some phlegm, difficulties breathing and vomiting for the past few weeks. She is unsure of how many times a day she has been vomiting. Additionally, she is unsure of if she has a fever. The patient denies having changes to her bowel or bladder habits, coughing up blood, vomiting blood or having black stool. She is taking blood pressure medications but has not history of heart attacks or other cardiac issues. Of note, she believes that she has an amoxicillin allergy.     REVIEW OF SYSTEMS:   All other systems reviewed and are negative except as noted above in HPI.    PATIENT HISTORY   History reviewed. No pertinent past medical history.  Patient Active Problem List   Diagnosis     Viral Syndrome     Acute Bronchitis     Muscle Aches, Generalized (Myalgias)     Pain During Urination (Dysuria)     Closed Fracture Of The Distal End Of The Radius     Acute Sinusitis     Allergies     Abnormal Weight Loss     Hypothyroidism     Migraine Headache     Impingement Of The Right Shoulder     Hypertension     Internal Derangement Of Left Knee     Syncope     Moderate episode of recurrent major depressive disorder (H)     Past Surgical History:   Procedure Laterality Date     OPEN REDUCTION INTERNAL FIXATION FOOT Right      dislocation (fell off ladder)     TONSILLECTOMY       TUBAL LIGATION         Allergies   Allergen Reactions     Ace Inhibitors Cough     NOTE:  patient doesn't recall this reaction; she is currently on Lisinopril without problems!!       Ampicillin Itching     Sulfa (Sulfonamide Antibiotics) [Sulfa Antibiotics] Nausea       OUTPATIENT MEDICATIONS     New Prescriptions    No medications on file      Vitals:    10/14/23 2214 10/14/23 2234 10/14/23 2245 10/14/23 2300   BP: 105/55 104/57 106/53 103/53   Pulse: 79 84 87 86   Resp: 26 13 13 14   Temp:       TempSrc:       SpO2: 90% 92% 93% 90%   Weight:       Height:           Physical Exam   Constitutional: Oriented to person, place, and time. Appears well-developed and well-nourished.   HEENT:    Head: Atraumatic.   Neck: Normal range of motion. Neck supple.   Cardiovascular: Normal rate, regular rhythm and normal heart sounds.    Pulmonary/Chest: Normal effort  and breath sounds normal.   Abdominal: Soft. Bowel sounds are normal. Periumbilical tenderness.   Musculoskeletal: Normal range of motion.   Neurological: Alert and oriented to person, place, and time. Normal strength. No sensory deficit. No cranial nerve deficit.  Skin: Skin is warm and dry.   Psychiatric: Normal mood and affect. Behavior is normal. Thought content normal.     DIAGNOSTICS    LABORATORY FINDINGS (REVIEWED AND INTERPRETED):  Labs Ordered and Resulted from Time of ED Arrival to Time of ED Departure   BASIC METABOLIC PANEL - Abnormal       Result Value    Sodium 138      Potassium 4.1      Chloride 97 (*)     Carbon Dioxide (CO2) 17 (*)     Anion Gap 24 (*)     Urea Nitrogen 29.7 (*)     Creatinine 1.13 (*)     GFR Estimate 52 (*)     Calcium 9.0      Glucose 140 (*)    HEPATIC FUNCTION PANEL - Abnormal    Protein Total 6.6      Albumin 3.2 (*)     Bilirubin Total 0.2      Alkaline Phosphatase 95      AST 11      ALT 7      Bilirubin Direct <0.20     LACTIC ACID WHOLE BLOOD - Abnormal    Lactic  Acid 5.1 (*)    TROPONIN T, HIGH SENSITIVITY - Abnormal    Troponin T, High Sensitivity 15 (*)    CBC WITH PLATELETS AND DIFFERENTIAL - Abnormal    WBC Count 14.6 (*)     RBC Count 3.34 (*)     Hemoglobin 8.7 (*)     Hematocrit 28.7 (*)     MCV 86      MCH 26.0 (*)     MCHC 30.3 (*)     RDW 15.4 (*)     Platelet Count 603 (*)     % Neutrophils 85      % Lymphocytes 9      % Monocytes 4      Mids % (Monos, Eos, Basos)        % Eosinophils 0      % Basophils 0      % Immature Granulocytes 2      NRBCs per 100 WBC 0      Absolute Neutrophils 12.3 (*)     Absolute Lymphocytes 1.3      Absolute Monocytes 0.6      Mids Abs (Monos, Eos, Basos)        Absolute Eosinophils 0.0      Absolute Basophils 0.1      Absolute Immature Granulocytes 0.3      Absolute NRBCs 0.0     LACTIC ACID WHOLE BLOOD - Abnormal    Lactic Acid 2.5 (*)    LIPASE - Normal    Lipase 20     INFLUENZA A/B, RSV, & SARS-COV2 PCR - Normal    Influenza A PCR Negative      Influenza B PCR Negative      RSV PCR Negative      SARS CoV2 PCR Negative     TROPONIN T, HIGH SENSITIVITY - Normal    Troponin T, High Sensitivity 12     ROUTINE UA WITH MICROSCOPIC REFLEX TO CULTURE   BLOOD CULTURE   BLOOD CULTURE         IMAGING (REVIEWED AND INTERPRETED):  CT Abdomen Pelvis w Contrast   Final Result   IMPRESSION:    1.  Wall thickening distal gastric body with ulceration anteriorly. There are a few mildly prominent surrounding lymph nodes. Diagnostic considerations include carcinoma versus gastritis.      2.  Tiny nonobstructing stone within the left kidney. No ureteric stone or hydronephrosis.      3.  Mild cylindrical bronchiectasis in the lung bases and lingula. Mild consolidation in the lingula.      XR Chest Port 1 View   Final Result   IMPRESSION: Heart size within normal limits for portable technique. Given patient's leftward rotation. Calcified aortic arch. Mildly hyperinflated lungs. No focal airspace consolidation. No visible pneumothorax or pleural  effusion. Osteopenia.          ECG (REVIEWED AND INTERPRETED):   ECG:   Performed at: 8:47 PM, 10/14/2023  HR:  80 bpm  Rhythm: Sinus  Axis: - 88  QRS duration: 110 ms  QTC: 489 ms  ST changes: No ST segment elevation or depression, no T wave inversion,No Q wave  Interpretation: Normal sinus rhythm. Right bundle branch block.   Compared to most recent ECG from 1/30/2013, no acute changes.     I have reviewed the patient's ECG, with comments made as listed above. Please see scanned image for full interpretation.       I, Christie Dyer , am serving as a scribe to document services personally performed by Kartik Juarez D.O., based on my observation and the provider s statements to me.    I, Kartik Juarez D.O., attest that Christie Dyer is acting in a scribe capacity, has observed my performance of the services and has documented them in accordance with my direction.    Kartik Juarez D.O.  EMERGENCY MEDICINE   10/14/23  [facility] EMERGENCY DEPARTMENT  [address] [place] [id number]  [phone]  Dept: [phone]     Kartik Juarez DO  10/15/23 0350

## 2023-10-15 NOTE — ED NOTES
Pt straight cathed per MD Juarez verbal order. Pt reports not feeling urge to urinate with 750 mL drained from bladder. Urine is moriah in color. No complication note, marcia LEMUS assisting, pt tolerated well.

## 2023-10-16 LAB
ALBUMIN SERPL BCG-MCNC: 2.8 G/DL (ref 3.5–5.2)
ALP SERPL-CCNC: 74 U/L (ref 35–104)
ALT SERPL W P-5'-P-CCNC: 6 U/L (ref 0–50)
ANION GAP SERPL CALCULATED.3IONS-SCNC: 11 MMOL/L (ref 7–15)
AST SERPL W P-5'-P-CCNC: 14 U/L (ref 0–45)
ATRIAL RATE - MUSE: 80 BPM
BILIRUB SERPL-MCNC: 0.2 MG/DL
BUN SERPL-MCNC: 7.3 MG/DL (ref 8–23)
CALCIUM SERPL-MCNC: 8 MG/DL (ref 8.8–10.2)
CHLORIDE SERPL-SCNC: 106 MMOL/L (ref 98–107)
CREAT SERPL-MCNC: 0.57 MG/DL (ref 0.51–0.95)
DEPRECATED HCO3 PLAS-SCNC: 24 MMOL/L (ref 22–29)
DIASTOLIC BLOOD PRESSURE - MUSE: 55 MMHG
EGFRCR SERPLBLD CKD-EPI 2021: >90 ML/MIN/1.73M2
ERYTHROCYTE [DISTWIDTH] IN BLOOD BY AUTOMATED COUNT: 15.8 % (ref 10–15)
FERRITIN SERPL-MCNC: 110 NG/ML (ref 11–328)
GLUCOSE SERPL-MCNC: 86 MG/DL (ref 70–99)
HCT VFR BLD AUTO: 24.2 % (ref 35–47)
HGB BLD-MCNC: 7.2 G/DL (ref 11.7–15.7)
INTERPRETATION ECG - MUSE: NORMAL
IRON BINDING CAPACITY (ROCHE): 219 UG/DL (ref 240–430)
IRON SATN MFR SERPL: 9 % (ref 15–46)
IRON SERPL-MCNC: 20 UG/DL (ref 37–145)
LDH SERPL L TO P-CCNC: 164 U/L (ref 0–250)
MCH RBC QN AUTO: 25.6 PG (ref 26.5–33)
MCHC RBC AUTO-ENTMCNC: 29.8 G/DL (ref 31.5–36.5)
MCV RBC AUTO: 86 FL (ref 78–100)
P AXIS - MUSE: 90 DEGREES
PLATELET # BLD AUTO: 425 10E3/UL (ref 150–450)
POTASSIUM SERPL-SCNC: 3.3 MMOL/L (ref 3.4–5.3)
POTASSIUM SERPL-SCNC: 3.8 MMOL/L (ref 3.4–5.3)
PR INTERVAL - MUSE: 124 MS
PROT SERPL-MCNC: 5.6 G/DL (ref 6.4–8.3)
QRS DURATION - MUSE: 110 MS
QT - MUSE: 424 MS
QTC - MUSE: 489 MS
R AXIS - MUSE: -88 DEGREES
RBC # BLD AUTO: 2.81 10E6/UL (ref 3.8–5.2)
SODIUM SERPL-SCNC: 141 MMOL/L (ref 135–145)
SYSTOLIC BLOOD PRESSURE - MUSE: 107 MMHG
T AXIS - MUSE: 23 DEGREES
VANCOMYCIN SERPL-MCNC: 10.7 UG/ML
VENTRICULAR RATE- MUSE: 80 BPM
VIT B12 SERPL-MCNC: 769 PG/ML (ref 232–1245)
WBC # BLD AUTO: 9.8 10E3/UL (ref 4–11)

## 2023-10-16 PROCEDURE — 999N000248 HC STATISTIC IV INSERT WITH US BY RN

## 2023-10-16 PROCEDURE — 82728 ASSAY OF FERRITIN: CPT | Performed by: STUDENT IN AN ORGANIZED HEALTH CARE EDUCATION/TRAINING PROGRAM

## 2023-10-16 PROCEDURE — 250N000011 HC RX IP 250 OP 636: Performed by: STUDENT IN AN ORGANIZED HEALTH CARE EDUCATION/TRAINING PROGRAM

## 2023-10-16 PROCEDURE — 250N000013 HC RX MED GY IP 250 OP 250 PS 637: Performed by: STUDENT IN AN ORGANIZED HEALTH CARE EDUCATION/TRAINING PROGRAM

## 2023-10-16 PROCEDURE — 83550 IRON BINDING TEST: CPT | Performed by: STUDENT IN AN ORGANIZED HEALTH CARE EDUCATION/TRAINING PROGRAM

## 2023-10-16 PROCEDURE — 250N000011 HC RX IP 250 OP 636: Mod: JZ | Performed by: FAMILY MEDICINE

## 2023-10-16 PROCEDURE — 85027 COMPLETE CBC AUTOMATED: CPT | Performed by: STUDENT IN AN ORGANIZED HEALTH CARE EDUCATION/TRAINING PROGRAM

## 2023-10-16 PROCEDURE — 80202 ASSAY OF VANCOMYCIN: CPT | Performed by: STUDENT IN AN ORGANIZED HEALTH CARE EDUCATION/TRAINING PROGRAM

## 2023-10-16 PROCEDURE — 82607 VITAMIN B-12: CPT | Performed by: STUDENT IN AN ORGANIZED HEALTH CARE EDUCATION/TRAINING PROGRAM

## 2023-10-16 PROCEDURE — 83615 LACTATE (LD) (LDH) ENZYME: CPT | Performed by: STUDENT IN AN ORGANIZED HEALTH CARE EDUCATION/TRAINING PROGRAM

## 2023-10-16 PROCEDURE — 84132 ASSAY OF SERUM POTASSIUM: CPT | Performed by: STUDENT IN AN ORGANIZED HEALTH CARE EDUCATION/TRAINING PROGRAM

## 2023-10-16 PROCEDURE — C9113 INJ PANTOPRAZOLE SODIUM, VIA: HCPCS | Mod: JZ | Performed by: FAMILY MEDICINE

## 2023-10-16 PROCEDURE — 120N000001 HC R&B MED SURG/OB

## 2023-10-16 PROCEDURE — 99233 SBSQ HOSP IP/OBS HIGH 50: CPT | Performed by: STUDENT IN AN ORGANIZED HEALTH CARE EDUCATION/TRAINING PROGRAM

## 2023-10-16 PROCEDURE — 80053 COMPREHEN METABOLIC PANEL: CPT | Performed by: STUDENT IN AN ORGANIZED HEALTH CARE EDUCATION/TRAINING PROGRAM

## 2023-10-16 PROCEDURE — 36415 COLL VENOUS BLD VENIPUNCTURE: CPT | Performed by: STUDENT IN AN ORGANIZED HEALTH CARE EDUCATION/TRAINING PROGRAM

## 2023-10-16 RX ORDER — POTASSIUM CHLORIDE 1500 MG/1
20 TABLET, EXTENDED RELEASE ORAL ONCE
Status: COMPLETED | OUTPATIENT
Start: 2023-10-16 | End: 2023-10-16

## 2023-10-16 RX ORDER — NALOXONE HYDROCHLORIDE 0.4 MG/ML
0.4 INJECTION, SOLUTION INTRAMUSCULAR; INTRAVENOUS; SUBCUTANEOUS
Status: DISCONTINUED | OUTPATIENT
Start: 2023-10-16 | End: 2023-10-24 | Stop reason: HOSPADM

## 2023-10-16 RX ORDER — CEFAZOLIN SODIUM 1 G/50ML
750 SOLUTION INTRAVENOUS EVERY 12 HOURS
Status: DISCONTINUED | OUTPATIENT
Start: 2023-10-16 | End: 2023-10-16

## 2023-10-16 RX ORDER — NALOXONE HYDROCHLORIDE 0.4 MG/ML
0.2 INJECTION, SOLUTION INTRAMUSCULAR; INTRAVENOUS; SUBCUTANEOUS
Status: DISCONTINUED | OUTPATIENT
Start: 2023-10-16 | End: 2023-10-24 | Stop reason: HOSPADM

## 2023-10-16 RX ORDER — ACETAMINOPHEN 325 MG/1
650 TABLET ORAL EVERY 6 HOURS PRN
Status: DISCONTINUED | OUTPATIENT
Start: 2023-10-16 | End: 2023-10-24 | Stop reason: HOSPADM

## 2023-10-16 RX ORDER — OXYCODONE HYDROCHLORIDE 5 MG/1
5 TABLET ORAL EVERY 4 HOURS PRN
Status: DISCONTINUED | OUTPATIENT
Start: 2023-10-16 | End: 2023-10-24 | Stop reason: HOSPADM

## 2023-10-16 RX ORDER — CEFEPIME HYDROCHLORIDE 2 G/1
2 INJECTION, POWDER, FOR SOLUTION INTRAVENOUS EVERY 8 HOURS
Status: DISCONTINUED | OUTPATIENT
Start: 2023-10-16 | End: 2023-10-18

## 2023-10-16 RX ORDER — ACETAMINOPHEN 650 MG/1
650 SUPPOSITORY RECTAL EVERY 6 HOURS PRN
Status: DISCONTINUED | OUTPATIENT
Start: 2023-10-16 | End: 2023-10-24 | Stop reason: HOSPADM

## 2023-10-16 RX ADMIN — VANCOMYCIN HYDROCHLORIDE 500 MG: 500 INJECTION, POWDER, LYOPHILIZED, FOR SOLUTION INTRAVENOUS at 05:41

## 2023-10-16 RX ADMIN — OXYCODONE HYDROCHLORIDE 5 MG: 5 TABLET ORAL at 14:03

## 2023-10-16 RX ADMIN — CEFEPIME HYDROCHLORIDE 2 G: 2 INJECTION, POWDER, FOR SOLUTION INTRAVENOUS at 23:51

## 2023-10-16 RX ADMIN — LISINOPRIL 20 MG: 20 TABLET ORAL at 09:06

## 2023-10-16 RX ADMIN — PANTOPRAZOLE SODIUM 40 MG: 40 INJECTION, POWDER, FOR SOLUTION INTRAVENOUS at 19:28

## 2023-10-16 RX ADMIN — LISINOPRIL 20 MG: 20 TABLET ORAL at 19:26

## 2023-10-16 RX ADMIN — ATENOLOL 100 MG: 25 TABLET ORAL at 09:06

## 2023-10-16 RX ADMIN — AMITRIPTYLINE HYDROCHLORIDE 50 MG: 25 TABLET, FILM COATED ORAL at 21:30

## 2023-10-16 RX ADMIN — ACETAMINOPHEN 650 MG: 325 TABLET ORAL at 14:03

## 2023-10-16 RX ADMIN — CEFEPIME HYDROCHLORIDE 2 G: 2 INJECTION, POWDER, FOR SOLUTION INTRAVENOUS at 18:21

## 2023-10-16 RX ADMIN — ESCITALOPRAM OXALATE 20 MG: 20 TABLET ORAL at 08:45

## 2023-10-16 RX ADMIN — PANTOPRAZOLE SODIUM 40 MG: 40 INJECTION, POWDER, FOR SOLUTION INTRAVENOUS at 08:45

## 2023-10-16 RX ADMIN — POTASSIUM CHLORIDE 20 MEQ: 1500 TABLET, EXTENDED RELEASE ORAL at 09:08

## 2023-10-16 RX ADMIN — POTASSIUM CHLORIDE 10 MEQ: 750 TABLET, EXTENDED RELEASE ORAL at 08:45

## 2023-10-16 RX ADMIN — LEVOTHYROXINE SODIUM 112 MCG: 0.11 TABLET ORAL at 08:52

## 2023-10-16 RX ADMIN — CEFEPIME HYDROCHLORIDE 2 G: 2 INJECTION, POWDER, FOR SOLUTION INTRAVENOUS at 08:43

## 2023-10-16 ASSESSMENT — ACTIVITIES OF DAILY LIVING (ADL)
ADLS_ACUITY_SCORE: 19
ADLS_ACUITY_SCORE: 21
ADLS_ACUITY_SCORE: 19
ADLS_ACUITY_SCORE: 19
ADLS_ACUITY_SCORE: 20
ADLS_ACUITY_SCORE: 19

## 2023-10-16 NOTE — PLAN OF CARE
Problem: Adult Inpatient Plan of Care  Goal: Plan of Care Review  Description: The Plan of Care Review/Shift note should be completed every shift.  The Outcome Evaluation is a brief statement about your assessment that the patient is improving, declining, or no change.  This information will be displayed automatically on your shift  note.  Outcome: Progressing   Goal Outcome Evaluation:    Patient is alert and oriented when awake. She slept majority of the night. Remains on contact precautions. Denies pain. Left PIV with NaCl infusing at 100mL/hr. Vancomycin infusing this morning. Pina in place with adequate output. Tele: NSR. Vital signs stable. Strict bedrest. Clear liquid diet.      Aracely Blakely RN

## 2023-10-16 NOTE — PROGRESS NOTES
[facility]    Medicine Progress Note - Hospitalist Service    Date of Admission:  10/14/2023    Assessment & Plan   Keyanna Palafox is a 68 year old female with PMH significant for HTN, hypothyroidism and MDD who is admitted on 10/15/2023 due to Sepsis of unknown source, abdominal pain, nausea and vomiting.     Sepsis of Unknown Source   Admit patient. Lactic= 5.1-->2.5. WBC=14.6. AMS/lethargy.   Urinalysis: Unremarkable  CXR no PNA. S/p Cefepime and Vancomycin in ED.   - Discontinue vancomycin and continue with cefepime for now  -BC: NGTD     Abdominal Pain, improving  Abdominal CAT scan reported with thickened gastric body with ulceration noted. Concern for gastritis vs carcinoma.   Appreciate GI consult  Considering endoscopy  Continue pantoprazole 40 mg IV twice daily    Nausea & Vomiting  - CT abd/pel concerning for gastritis vs carcinoma.  PRN Zofran. PPI BID.   -Improving    Anemia  - Hb=8.7. CT abd pel reveals thickened distal gastric body with ulceration anteriorly. Consult Gastroenterology to rule out GI bleed.     Leukocytosis  - continue empiric Cefepime for Sepsis of unknown source.  - trending down     DELMI  - [place] prerenal. Continue IV hydration. Avoid nephrotoxic agents.      Elevated troponin  - Improved. Initial troponin= 15-->12. Possible demand ischemia vs DELMI.      Flea infestation  - Contact isolation. Trial of Permethrin lotion.    Hypokalemia  -Replace per protocol        Diet: Advance Diet as Tolerated: Regular Diet Adult    DVT Prophylaxis: Pneumatic Compression Devices  Pina Catheter: PRESENT, indication: Retention  Lines: None     Cardiac Monitoring: None  Code Status: Full Code      Clinically Significant Risk Factors        # Hypokalemia: Lowest K = 3.3 mmol/L in last 2 days, will replace as needed      # Anion Gap Metabolic Acidosis: Highest Anion Gap = 24 mmol/L in last 2 days, will monitor and treat as appropriate  # Hypoalbuminemia: Lowest albumin =  2.8 g/dL at 10/16/2023  5:09 AM, will monitor as appropriate     # Hypertension: Noted on problem list                   Disposition Plan      Expected Discharge Date: 10/19/2023    Discharge Delays: Voiding/Eating Trial needed  Procedure Pending (enter procedure & time in comments)  Placement - LTC  PT New Consult needed                Gigi Umanzor MD  Hospitalist Service  [facility]  Securely message with [employer] (more info)  Text page via [employer] Paging/Directory   ______________________________________________________________________    Interval History   Patient sitting on chair at the bedside.  No distress noted.  She states abdominal pain and nausea has improved.  Management plan discussed with the patient and she expressed understanding.    Physical Exam   Vital Signs: Temp: 97.9  F (36.6  C) Temp src: Oral BP: 100/53 Pulse: 69   Resp: 17 SpO2: 98 % O2 Device: Nasal cannula Oxygen Delivery: 1 LPM  Weight: 108 lbs 7.46 oz    General Appearance: No distress noted  Respiratory: Good air entry bilaterally  Cardiovascular: S1 and S2 well heard, no murmur or gallop  GI: Soft abdomen, no tenderness, normoactive bowel sounds  Skin: Intact and warm       Medical Decision Making       50 MINUTES SPENT BY ME on the date of service doing chart review, history, exam, documentation & further activities per the note.      Data

## 2023-10-16 NOTE — PLAN OF CARE
Problem: Gas Exchange Impaired  Goal: Optimal Gas Exchange  Outcome: Progressing     Problem: Adult Inpatient Plan of Care  Goal: Absence of Hospital-Acquired Illness or Injury  Intervention: Identify and Manage Fall Risk  Recent Flowsheet Documentation  Taken 10/15/2023 1854 by Fawn Jauregui, RN  Safety Promotion/Fall Prevention:   increased rounding and observation   toileting scheduled  Goal: Readiness for Transition of Care  Intervention: Mutually Develop Transition Plan  Recent Flowsheet Documentation  Taken 10/15/2023 2000 by Fawn Jauregui, RN  Equipment Currently Used at Home: none     Problem: Pain Acute  Goal: Optimal Pain Control and Function  Outcome: Progressing  Intervention: Prevent or Manage Pain  Recent Flowsheet Documentation  Taken 10/15/2023 1854 by Fawn Jauregui, RN  Medication Review/Management: medications reviewed   Goal Outcome Evaluation:  Pt has been lethargic and flat all shift.  IV Cefepime tolerated well.  IV NS running at 75 ml/hr.  Pt denies pain.  She admits she is not able to care for herself at home.  Clear liquids tolerated but she only drank one apple juice rejecting anything else.  Pina draining clear yellow urine.

## 2023-10-16 NOTE — CONSULTS
Care Management Initial Consult    General Information  Assessment completed with: Patient in person and grandson by phone  Type of CM/SW Visit: Initial Assessment    Primary Care Provider verified and updated as needed:   no  Readmission within the last 30 days: no previous admission in last 30 days      Reason for Consult: discharge planning, community resources, emotional/coping/adjustment concerns, health care directive  Advance Care Planning:     requested        Communication Assessment  Patient's communication style: spoken language (English or Bilingual)    Hearing Difficulty or Deaf: no   Wear Glasses or Blind: no    Cognitive  Cognitive/Neuro/Behavioral: .WDL except, arousability  Level of Consciousness: alert  Arousal Level: arouses to touch/gentle shaking  Orientation: disoriented to, time  Mood/Behavior: calm, cooperative  Best Language: 0 - No aphasia  Speech: clear    Living Environment:   People in home: alone     Current living Arrangements:        Able to return to prior arrangements:         Family/Social Support:  Care provided by: self, other (see comments) (Nayeli Jamarcus)  Provides care for: pet(s)  Marital Status:   Other (specify) (nayeli Lebron)          Description of Support System: Other (see comments) (gaining more support)         Current Resources:   Patient receiving home care services: No     Community Resources:    Equipment currently used at home: none  Supplies currently used at home: None    Employment/Financial:  Employment Status: retired ([employer])        Financial Concerns: other (see comments) (fleas and dirty home concerns)           Does the patient's insurance plan have a 3 day qualifying hospital stay waiver?  No    Lifestyle & Psychosocial Needs:  Social Determinants of Health     Food Insecurity: Not on file   Depression: Not at risk (5/31/2023)    PHQ-2     PHQ-2 Score: 0   Housing Stability: Not on file   Tobacco Use: High Risk  (10/14/2023)    Patient History     Smoking Tobacco Use: Every Day     Smokeless Tobacco Use: Former     Passive Exposure: Not on file   Financial Resource Strain: Not on file   Alcohol Use: Not on file   Transportation Needs: Not on file   Physical Activity: Not on file   Interpersonal Safety: Not on file   Stress: Not on file   Social Connections: Not on file       Functional Status:  Prior to admission patient needed assistance:   Dependent ADLs:: Independent          Mental Health Status:         Patient was falling asleep during assessment and did not report any needs.    Chemical Dependency Status:            Patient was falling asleep during assessment and did not report any needs.    Values/Beliefs:  Spiritual, Cultural Beliefs, Hoahaoism Practices, Values that affect care: other (see comments)       Cultural/Hoahaoism Practices Patient Routinely Participates In:  (fell asleep while asking)       Additional Information:  Assessed    I met with patient who was very sleepy and falling asleep during interview.    Patient reports that she lives alone in her house with three cats. Her   in . Patient reports that she retired from [employer] as a . She receives social security income and pension.  Patient reports no concerns from home. This writer addressed the fleas and reported dirty home- patient reported that her grandson was helping her with this. He bug bombed the house today and is clearing out trash. She gave permission to this writer to call the grandson Lebron- [phone]  Patient reports that her grandson has been giving her more support but that he does not drive. Patient reports that she still drives. She will need a cab home from the hospital. Patient was unable to name others in her support network. Patient was falling asleep while trying to discuss mental health, chemical dependency and other supports.  Patient reported no equipment use or supplies. Patient reported that  she currently owns all the equipment because her  used it. She denies any home care support or informal ADL supports other than her grandson. Patient requests that grandson be given a copy of the honoring choices advanced directive paperwork. She would like help filling it out and notarizing it.      PC to Lebron who was guarded to share too much information but reported that his grandmother would be coming to live with him in his home upon discharge and that he would be taking care of things for her. He gave his email address for the advance directive honoring choices paperwork- [email or web address].     Ragini Claros, CHLOE  3:44 PM   10/16/23

## 2023-10-16 NOTE — PHARMACY-VANCOMYCIN DOSING SERVICE
Pharmacy Vancomycin Note  Date of Service 2023  Patient's  1955   68 year old, female    Indication: Sepsis  Day of Therapy: 3  Current vancomycin regimen:  750 mg IV q12h  Current vancomycin monitoring method: AUC  Current vancomycin therapeutic monitoring goal: 400-600 mg*h/L    Insight[employer] Prediction of Current Vancomycin Regimen  Loading dose: N/A  Regimen: 500 mg IV every 12 hours.  Start time: 17:41 on 10/16/2023  Exposure target: AUC24 (range)400-600 mg/L.hr   AUC24,ss: 295 mg/L.hr  Probability of AUC24 > 400: 11 %  Ctrough,ss: 8.8 mg/L  Probability of Ctrough,ss > 20: 1 %  Probability of nephrotoxicity (Lodise JESUS ): 5 %    Current estimated CrCl = Estimated Creatinine Clearance: 72.3 mL/min (based on SCr of 0.57 mg/dL).    Creatinine for last 3 days  10/14/2023:  7:50 PM Creatinine 1.13 mg/dL  10/15/2023:  7:13 AM Creatinine 0.86 mg/dL  10/16/2023:  5:09 AM Creatinine 0.57 mg/dL    Recent Vancomycin Levels (past 3 days)  10/16/2023:  5:09 AM Vancomycin 10.7 ug/mL    Vancomycin IV Administrations (past 72 hours)                     vancomycin (VANCOCIN) 500 mg vial to attach to  mL bag (mg) 500 mg New Bag 10/16/23 0541     500 mg New Bag 10/15/23 1622     500 mg New Bag  0607    vancomycin (VANCOCIN) 1,250 mg in sodium chloride 0.9 % 250 mL intermittent infusion (mg) 1,250 mg New Bag 10/14/23 2156                    Nephrotoxins and other renal medications (From now, onward)      Start     Dose/Rate Route Frequency Ordered Stop    10/15/23 1200  lisinopril (ZESTRIL) tablet 20 mg        Note to Pharmacy: PTA Sig:Take 1 tablet (20 mg) by mouth 2 times daily      20 mg Oral 2 TIMES DAILY 10/15/23 1151      10/15/23 0500  vancomycin (VANCOCIN) 500 mg vial to attach to  mL bag         500 mg  over 1 Hours Intravenous EVERY 12 HOURS 10/15/23 0114                 Contrast Orders - past 72 hours (72h ago, onward)      Start     Dose/Rate Route Frequency Stop    10/14/23 0951   iopamidol (ISOVUE-370) solution 80 mL         80 mL Intravenous ONCE 10/14/23 2248            Interpretation of levels and current regimen:  Vancomycin level is reflective of AUC less than 400    Has serum creatinine changed greater than 50% in last 72 hours: No    Urine output:  good urine output    Renal Function: Improving    InsightRX Prediction of Planned New Vancomycin Regimen  Loading dose: N/A  Regimen: 750 mg IV every 12 hours.  Start time: 17:41 on 10/16/2023  Exposure target: AUC24 (range)400-600 mg/L.hr   AUC24,ss: 438 mg/L.hr  Probability of AUC24 > 400: 65 %  Ctrough,ss: 13 mg/L  Probability of Ctrough,ss > 20: 9 %  Probability of nephrotoxicity (Lodise JESUS 2009): 8 %      Plan:  Increase Dose to 750 mg every 12 hours.  Vancomycin monitoring method: AUC  Vancomycin therapeutic monitoring goal: 400-600 mg*h/L  Pharmacy will check vancomycin levels as appropriate in 1-3 Days.  Serum creatinine levels will be ordered daily for the first week of therapy and at least twice weekly for subsequent weeks.    Nicollette McMann, [facility]

## 2023-10-16 NOTE — PROGRESS NOTES
"CLINICAL NUTRITION SERVICES - ASSESSMENT NOTE     Nutrition Prescription    RECOMMENDATIONS FOR MDs/PROVIDERS TO ORDER:  100 mg thiamine daily x 7 days  Multivitamin with minerals daily  Monitor and replace k, mg, phos as needed due to possibility of refeeding syndrome    Malnutrition Status:    TBD - suspected    Recommendations already ordered by Registered Dietitian (RD):  Ensure vanilla daily    Future/Additional Recommendations:  Will monitor po, wt, labs, and obtain nutrition/wt hx and nfpe as able     REASON FOR ASSESSMENT  Keyanna Palafox is a/an 68 year old female assessed by the dietitian for admission risk screen eating poorly with decreased appetite and weight loss    Per chart, pt with PMH significant for HTN, hypothyroidism and MDD who is admitted on 10/15/2023 due to Sepsis of unknown source, abdominal pain, nausea and vomiting.  Paramedics report pt covered in urine and feces. Flea infested. Pt is a poor historian. AMS. Per GI, CT show thickened gastric body with ulceration and mildly prominent surrounding adenopathy, plan for EDG when mental status clears.      NUTRITION HISTORY  Per chart - nausea and vomiting x 6 weeks  Pt busy receiving cares    CURRENT NUTRITION ORDERS  Diet: Regular  Intake/Tolerance: 150 mL po fluids recorded today    LABS  Labs reviewed  K 3.3 L  Hgb 7.2 L    MEDICATIONS  Medications reviewed  IV abx  Levothyroxine  Kcl  K replacement protocol    ANTHROPOMETRICS  Height: 157.5 cm (5' 2\")  Most Recent Weight: 49.2 kg (108 lb 7.5 oz)  7.6% loss in 5 months - may be more recent due to altered gi function   IBW: 50 kg  BMI: Normal BMI  Weight History:   Wt Readings from Last 3 Encounters:   10/15/23 49.2 kg (108 lb 7.5 oz)   05/31/23 53.1 kg (117 lb)   01/28/22 52.8 kg (116 lb 8 oz)       Dosing Weight: 49.2 kg    ASSESSED NUTRITION NEEDS  Estimated Energy Needs: 1475 - 1725 kcals/day (30 - 35 kcals/kg )  Justification: Increased needs, weight loss  Estimated Protein Needs: " 59 - 74 grams protein/day (1.2 - 1.5 grams of pro/kg)  Justification: Increased needs  Estimated Fluid Needs: 1475 mL/day (30 mL/kg), or per provider   Justification: Maintenance    PHYSICAL FINDINGS  GI: WDL, no BM yet    MALNUTRITION:  % Weight Loss:  Does not meet criteria with current information - need weight hx from pt in case weight loss is recent  % Intake:  need nutrition hx - intake obviously down to abdominal pain, n/v  Subcutaneous Fat Loss:  unable to assess  Muscle Loss:  Unable to assess  Fluid Retention:  None noted    Malnutrition Diagnosis: Unable to determine due to need nutrition/wt hx and nfpe    NUTRITION DIAGNOSIS  Inadequate oral intake related to altered gi function as evidenced by abdominal pain, c/o nausea and vomiting x 6 weeks, weight loss      INTERVENTIONS  Implementation  Medical food supplement therapy     Goals  Patient to consume % of nutritionally adequate meals three times per day, or the equivalent with supplements/snacks.     Monitoring/Evaluation  Progress toward goals will be monitored and evaluated per protocol.

## 2023-10-16 NOTE — PROGRESS NOTES
Patient alert and oriented.  Denies pain initially but then c/o severe neck pain after sitting up for lunch.  MD updated and patient given oxycodone 5mg and acetaminophen which was effective with patient rating pain at a 2.  Pina patent and draining.  Up with stby assist. Tolerating regular diet well.  Had young man visiting today.  K recheck in AM for protocol.  NPO at midnight for EGD

## 2023-10-16 NOTE — PROGRESS NOTES
"[facility] Progress Note     IMPRESSION:  This is a 69 yo female admitted on 10/15/2023 due to sepsis of unknown source, abdominal pain, nausea, vomiting and anemia.      # Abd pain, nausea, vomiting - Reports symptoms present for 6 weeks.  CT with contrast showing thickened gastric body with ulceration and mildly prominent surrounding adenopathy.  Further eval with EGD once mental status improves.     # Anemia - Hgb 8.7 on admit versus 12.3 on 2023. No reported hx of GI blood loss. Iron studies low (iron sat 9%, iron level 20), Ferritin and B12 pending    # Altered mental status - Appears improved although this is my first day with her. TSH normal. No asterixis flap on exam. Flu and COVID neg. Cultures pending.     # Leukocytosis  - improved. Initially WBC 15K, now normalized.  On abx. Work-up in progress. Blood cultures 10/14 NGTD. MRSA negative. Influenza negative. Lactic acid 5.1 at admission -> now 1.6.     # Flea infestation - Permethrin.    RECOMMENDATIONS:  - PPI BID.  - Antiemetics PRN.  - Blood culture pending.  - Will discuss with attending GI if able to pursue EGD tomorrow.  Will update once I know more.        Approximately 15 minutes of total time was spent providing patient care, including patient evaluation, reviewing documentation/test results, and     Jesus Singh PA-C  [facility]  [phone]  ________________________________________________________________________      SUBJECTIVE:    Grandson present this AM. Alert and oriented. Feeling ok this morning, overall feels weak and tired. Some lower abdominal pains. No nausea or vomiting. No evidence of GI blood loss.      OBJECTIVE:  /57 (BP Location: Right arm)   Pulse 71   Temp 98  F (36.7  C) (Oral)   Resp 17   Ht 1.575 m (5' 2\")   Wt 49.2 kg (108 lb 7.5 oz)   SpO2 98%   BMI 19.84 kg/m    Temp (24hrs), Av  F (36.7  C), Min:98  F (36.7  C), Max:98  F (36.7  C)    Patient Vitals for the " past 72 hrs:   Weight   10/15/23 1849 49.2 kg (108 lb 7.5 oz)   10/14/23 1945 53.1 kg (117 lb)       Intake/Output Summary (Last 24 hours) at 10/16/2023 0847  Last data filed at 10/16/2023 0809  Gross per 24 hour   Intake 1471.66 ml   Output 1550 ml   Net -78.34 ml        PHYSICAL EXAM  GEN: Alert, oriented x3, communicative and in NAD.    LYMPH: No LAD noted.  HRT: RRR  LUNGS: CTA  ABD:  ND, +BS, no guarding or pain to palpation, no rebound, no HSM.  SKIN: No rash, jaundice or spider angiomata      LABS:  I have reviewed the patient's new clinical lab results:     Recent Labs   Lab Test 10/16/23  0509 10/15/23  0713 10/14/23  1951   WBC 9.8 15.2* 14.6*   HGB 7.2* 8.4* 8.7*   MCV 86 86 86    532* 603*     Recent Labs   Lab Test 10/16/23  0509 10/15/23  0713 10/14/23  1951   POTASSIUM 3.3* 4.4 4.1   CHLORIDE 106 105 97*   CO2 24 20* 17*   BUN 7.3* 22.0 29.7*   CR 0.57 0.86 1.13*   ANIONGAP 11 15 24*     Recent Labs   Lab Test 10/16/23  0509 10/15/23  0713 10/15/23  0107 10/14/23  1951   ALBUMIN 2.8* 3.0*  --  3.2*   BILITOTAL 0.2 0.2  --  0.2   ALT 6 6  --  7   AST 14 10  --  11   PROTEIN  --   --  20*  --    LIPASE  --   --   --  20         IMAGING:  reviewed

## 2023-10-17 ENCOUNTER — ANESTHESIA EVENT (OUTPATIENT)
Dept: SURGERY | Facility: HOSPITAL | Age: 68
DRG: 871 | End: 2023-10-17
Payer: COMMERCIAL

## 2023-10-17 ENCOUNTER — ANESTHESIA (OUTPATIENT)
Dept: SURGERY | Facility: HOSPITAL | Age: 68
DRG: 871 | End: 2023-10-17
Payer: COMMERCIAL

## 2023-10-17 LAB
ANION GAP SERPL CALCULATED.3IONS-SCNC: 10 MMOL/L (ref 7–15)
BUN SERPL-MCNC: 8.8 MG/DL (ref 8–23)
CALCIUM SERPL-MCNC: 8.4 MG/DL (ref 8.8–10.2)
CHLORIDE SERPL-SCNC: 104 MMOL/L (ref 98–107)
CREAT SERPL-MCNC: 0.55 MG/DL (ref 0.51–0.95)
DEPRECATED HCO3 PLAS-SCNC: 25 MMOL/L (ref 22–29)
EGFRCR SERPLBLD CKD-EPI 2021: >90 ML/MIN/1.73M2
ERYTHROCYTE [DISTWIDTH] IN BLOOD BY AUTOMATED COUNT: 15.9 % (ref 10–15)
FOLATE SERPL-MCNC: 3.2 NG/ML (ref 4.6–34.8)
GLUCOSE SERPL-MCNC: 91 MG/DL (ref 70–99)
HCT VFR BLD AUTO: 29.2 % (ref 35–47)
HGB BLD-MCNC: 8.9 G/DL (ref 11.7–15.7)
MCH RBC QN AUTO: 26.1 PG (ref 26.5–33)
MCHC RBC AUTO-ENTMCNC: 30.5 G/DL (ref 31.5–36.5)
MCV RBC AUTO: 86 FL (ref 78–100)
PLATELET # BLD AUTO: 439 10E3/UL (ref 150–450)
POTASSIUM SERPL-SCNC: 3.6 MMOL/L (ref 3.4–5.3)
RBC # BLD AUTO: 3.41 10E6/UL (ref 3.8–5.2)
SODIUM SERPL-SCNC: 139 MMOL/L (ref 135–145)
UPPER GI ENDOSCOPY: NORMAL
WBC # BLD AUTO: 12.9 10E3/UL (ref 4–11)

## 2023-10-17 PROCEDURE — 999N000141 HC STATISTIC PRE-PROCEDURE NURSING ASSESSMENT: Performed by: INTERNAL MEDICINE

## 2023-10-17 PROCEDURE — 82746 ASSAY OF FOLIC ACID SERUM: CPT | Performed by: STUDENT IN AN ORGANIZED HEALTH CARE EDUCATION/TRAINING PROGRAM

## 2023-10-17 PROCEDURE — 250N000011 HC RX IP 250 OP 636: Performed by: STUDENT IN AN ORGANIZED HEALTH CARE EDUCATION/TRAINING PROGRAM

## 2023-10-17 PROCEDURE — 120N000001 HC R&B MED SURG/OB

## 2023-10-17 PROCEDURE — 272N000001 HC OR GENERAL SUPPLY STERILE: Performed by: INTERNAL MEDICINE

## 2023-10-17 PROCEDURE — 99233 SBSQ HOSP IP/OBS HIGH 50: CPT | Performed by: STUDENT IN AN ORGANIZED HEALTH CARE EDUCATION/TRAINING PROGRAM

## 2023-10-17 PROCEDURE — 370N000017 HC ANESTHESIA TECHNICAL FEE, PER MIN: Performed by: INTERNAL MEDICINE

## 2023-10-17 PROCEDURE — 88305 TISSUE EXAM BY PATHOLOGIST: CPT | Mod: TC | Performed by: INTERNAL MEDICINE

## 2023-10-17 PROCEDURE — 250N000013 HC RX MED GY IP 250 OP 250 PS 637: Performed by: PAIN MEDICINE

## 2023-10-17 PROCEDURE — 85027 COMPLETE CBC AUTOMATED: CPT | Performed by: STUDENT IN AN ORGANIZED HEALTH CARE EDUCATION/TRAINING PROGRAM

## 2023-10-17 PROCEDURE — 360N000075 HC SURGERY LEVEL 2, PER MIN: Performed by: INTERNAL MEDICINE

## 2023-10-17 PROCEDURE — 99221 1ST HOSP IP/OBS SF/LOW 40: CPT | Performed by: PAIN MEDICINE

## 2023-10-17 PROCEDURE — 250N000011 HC RX IP 250 OP 636: Mod: JZ | Performed by: NURSE ANESTHETIST, CERTIFIED REGISTERED

## 2023-10-17 PROCEDURE — C9113 INJ PANTOPRAZOLE SODIUM, VIA: HCPCS | Mod: JZ | Performed by: FAMILY MEDICINE

## 2023-10-17 PROCEDURE — 250N000009 HC RX 250: Performed by: NURSE ANESTHETIST, CERTIFIED REGISTERED

## 2023-10-17 PROCEDURE — 250N000011 HC RX IP 250 OP 636: Mod: JZ | Performed by: FAMILY MEDICINE

## 2023-10-17 PROCEDURE — 80048 BASIC METABOLIC PNL TOTAL CA: CPT | Performed by: STUDENT IN AN ORGANIZED HEALTH CARE EDUCATION/TRAINING PROGRAM

## 2023-10-17 PROCEDURE — 0DB68ZX EXCISION OF STOMACH, VIA NATURAL OR ARTIFICIAL OPENING ENDOSCOPIC, DIAGNOSTIC: ICD-10-PCS | Performed by: INTERNAL MEDICINE

## 2023-10-17 PROCEDURE — 258N000003 HC RX IP 258 OP 636: Performed by: NURSE ANESTHETIST, CERTIFIED REGISTERED

## 2023-10-17 PROCEDURE — 36415 COLL VENOUS BLD VENIPUNCTURE: CPT | Performed by: STUDENT IN AN ORGANIZED HEALTH CARE EDUCATION/TRAINING PROGRAM

## 2023-10-17 PROCEDURE — 250N000013 HC RX MED GY IP 250 OP 250 PS 637: Performed by: STUDENT IN AN ORGANIZED HEALTH CARE EDUCATION/TRAINING PROGRAM

## 2023-10-17 RX ORDER — SODIUM CHLORIDE, SODIUM LACTATE, POTASSIUM CHLORIDE, CALCIUM CHLORIDE 600; 310; 30; 20 MG/100ML; MG/100ML; MG/100ML; MG/100ML
INJECTION, SOLUTION INTRAVENOUS CONTINUOUS
Status: DISCONTINUED | OUTPATIENT
Start: 2023-10-17 | End: 2023-10-17 | Stop reason: HOSPADM

## 2023-10-17 RX ORDER — LIDOCAINE HYDROCHLORIDE 10 MG/ML
INJECTION, SOLUTION INFILTRATION; PERINEURAL PRN
Status: DISCONTINUED | OUTPATIENT
Start: 2023-10-17 | End: 2023-10-17

## 2023-10-17 RX ORDER — LIDOCAINE 40 MG/G
CREAM TOPICAL
Status: CANCELLED | OUTPATIENT
Start: 2023-10-17

## 2023-10-17 RX ORDER — ONDANSETRON 2 MG/ML
4 INJECTION INTRAMUSCULAR; INTRAVENOUS EVERY 30 MIN PRN
Status: DISCONTINUED | OUTPATIENT
Start: 2023-10-17 | End: 2023-10-17 | Stop reason: HOSPADM

## 2023-10-17 RX ORDER — ONDANSETRON 4 MG/1
4 TABLET, ORALLY DISINTEGRATING ORAL EVERY 30 MIN PRN
Status: DISCONTINUED | OUTPATIENT
Start: 2023-10-17 | End: 2023-10-17 | Stop reason: HOSPADM

## 2023-10-17 RX ORDER — GLYCOPYRROLATE 0.2 MG/ML
INJECTION, SOLUTION INTRAMUSCULAR; INTRAVENOUS PRN
Status: DISCONTINUED | OUTPATIENT
Start: 2023-10-17 | End: 2023-10-17

## 2023-10-17 RX ORDER — SODIUM CHLORIDE, SODIUM LACTATE, POTASSIUM CHLORIDE, CALCIUM CHLORIDE 600; 310; 30; 20 MG/100ML; MG/100ML; MG/100ML; MG/100ML
INJECTION, SOLUTION INTRAVENOUS CONTINUOUS
Status: CANCELLED | OUTPATIENT
Start: 2023-10-17

## 2023-10-17 RX ORDER — PROPOFOL 10 MG/ML
INJECTION, EMULSION INTRAVENOUS CONTINUOUS PRN
Status: DISCONTINUED | OUTPATIENT
Start: 2023-10-17 | End: 2023-10-17

## 2023-10-17 RX ORDER — ONDANSETRON 2 MG/ML
INJECTION INTRAMUSCULAR; INTRAVENOUS PRN
Status: DISCONTINUED | OUTPATIENT
Start: 2023-10-17 | End: 2023-10-17

## 2023-10-17 RX ORDER — SODIUM CHLORIDE, SODIUM LACTATE, POTASSIUM CHLORIDE, CALCIUM CHLORIDE 600; 310; 30; 20 MG/100ML; MG/100ML; MG/100ML; MG/100ML
INJECTION, SOLUTION INTRAVENOUS CONTINUOUS PRN
Status: DISCONTINUED | OUTPATIENT
Start: 2023-10-17 | End: 2023-10-17

## 2023-10-17 RX ORDER — PROPOFOL 10 MG/ML
INJECTION, EMULSION INTRAVENOUS PRN
Status: DISCONTINUED | OUTPATIENT
Start: 2023-10-17 | End: 2023-10-17

## 2023-10-17 RX ORDER — LIDOCAINE 50 MG/G
OINTMENT TOPICAL 3 TIMES DAILY
Status: DISCONTINUED | OUTPATIENT
Start: 2023-10-17 | End: 2023-10-24 | Stop reason: HOSPADM

## 2023-10-17 RX ADMIN — CEFEPIME HYDROCHLORIDE 2 G: 2 INJECTION, POWDER, FOR SOLUTION INTRAVENOUS at 17:16

## 2023-10-17 RX ADMIN — ESCITALOPRAM OXALATE 20 MG: 20 TABLET ORAL at 11:56

## 2023-10-17 RX ADMIN — GLYCOPYRROLATE 0.1 MG: 0.2 INJECTION INTRAMUSCULAR; INTRAVENOUS at 14:28

## 2023-10-17 RX ADMIN — ONDANSETRON 4 MG: 2 INJECTION INTRAMUSCULAR; INTRAVENOUS at 14:23

## 2023-10-17 RX ADMIN — CEFEPIME HYDROCHLORIDE 2 G: 2 INJECTION, POWDER, FOR SOLUTION INTRAVENOUS at 11:57

## 2023-10-17 RX ADMIN — LIDOCAINE HYDROCHLORIDE 30 MG: 10 INJECTION, SOLUTION INFILTRATION; PERINEURAL at 14:27

## 2023-10-17 RX ADMIN — GLYCOPYRROLATE 0.1 MG: 0.2 INJECTION INTRAMUSCULAR; INTRAVENOUS at 14:24

## 2023-10-17 RX ADMIN — ATENOLOL 100 MG: 25 TABLET ORAL at 11:56

## 2023-10-17 RX ADMIN — LISINOPRIL 20 MG: 20 TABLET ORAL at 20:30

## 2023-10-17 RX ADMIN — PROPOFOL 10 MG: 10 INJECTION, EMULSION INTRAVENOUS at 14:27

## 2023-10-17 RX ADMIN — PANTOPRAZOLE SODIUM 40 MG: 40 INJECTION, POWDER, FOR SOLUTION INTRAVENOUS at 12:11

## 2023-10-17 RX ADMIN — PANTOPRAZOLE SODIUM 40 MG: 40 INJECTION, POWDER, FOR SOLUTION INTRAVENOUS at 20:31

## 2023-10-17 RX ADMIN — PROPOFOL 150 MCG/KG/MIN: 10 INJECTION, EMULSION INTRAVENOUS at 14:24

## 2023-10-17 RX ADMIN — DICLOFENAC 2 G: 10 GEL TOPICAL at 20:36

## 2023-10-17 RX ADMIN — AMITRIPTYLINE HYDROCHLORIDE 50 MG: 25 TABLET, FILM COATED ORAL at 21:54

## 2023-10-17 RX ADMIN — SODIUM CHLORIDE, POTASSIUM CHLORIDE, SODIUM LACTATE AND CALCIUM CHLORIDE: 600; 310; 30; 20 INJECTION, SOLUTION INTRAVENOUS at 14:15

## 2023-10-17 RX ADMIN — ACETAMINOPHEN 650 MG: 325 TABLET ORAL at 17:42

## 2023-10-17 RX ADMIN — LEVOTHYROXINE SODIUM 112 MCG: 0.11 TABLET ORAL at 11:57

## 2023-10-17 ASSESSMENT — ACTIVITIES OF DAILY LIVING (ADL)
ADLS_ACUITY_SCORE: 21
ADLS_ACUITY_SCORE: 22
ADLS_ACUITY_SCORE: 21
ADLS_ACUITY_SCORE: 26

## 2023-10-17 ASSESSMENT — LIFESTYLE VARIABLES: TOBACCO_USE: 1

## 2023-10-17 NOTE — ANESTHESIA PREPROCEDURE EVALUATION
Anesthesia Pre-Procedure Evaluation    Patient: Keyanna Palafox   MRN: [id number] : 1955        Procedure : Procedure(s):  ESOPHAGOGASTRODUODENOSCOPY          History reviewed. No pertinent past medical history.   Past Surgical History:   Procedure Laterality Date    OPEN REDUCTION INTERNAL FIXATION FOOT Right     dislocation (fell off ladder)    TONSILLECTOMY      TUBAL LIGATION        Allergies   Allergen Reactions    Ace Inhibitors Cough     NOTE:  patient doesn't recall this reaction; she is currently on Lisinopril without problems!!      Ampicillin Itching    Sulfa (Sulfonamide Antibiotics) [Sulfa Antibiotics] Nausea      Social History     Tobacco Use    Smoking status: Every Day     Types: Cigarettes    Smokeless tobacco: Former   Substance Use Topics    Alcohol use: Not on file      Wt Readings from Last 1 Encounters:   10/15/23 49.2 kg (108 lb 7.5 oz)        Anesthesia Evaluation   Pt has had prior anesthetic.     No history of anesthetic complications       ROS/MED HX  ENT/Pulmonary:     (+)                tobacco use, Past use,                      Neurologic:     (+)      migraines,                          Cardiovascular:     (+)  hypertension- -   -  - -                                      METS/Exercise Tolerance: >4 METS    Hematologic:     (+)      anemia,          Musculoskeletal:       GI/Hepatic:  - neg GI/hepatic ROS     Renal/Genitourinary:  - neg Renal ROS     Endo: Comment: admitted on 10/15/2023 due to Sepsis of unknown source    (+)          thyroid problem, hypothyroidism,           Psychiatric/Substance Use:     (+) psychiatric history depression       Infectious Disease: Comment: Flee infestation, contact precautions, treated with medicated lotion        Malignancy:       Other:            Physical Exam    Airway        Mallampati: II   TM distance: > 3 FB   Neck ROM: full   Mouth opening: > 3 cm    Respiratory Devices and Support         Dental       (+) Multiple visibly  "decayed, broken teeth      Cardiovascular   cardiovascular exam normal          Pulmonary   pulmonary exam normal                OUTSIDE LABS:  CBC:   Lab Results   Component Value Date    WBC 12.9 (H) 10/17/2023    WBC 9.8 10/16/2023    HGB 8.9 (L) 10/17/2023    HGB 7.2 (L) 10/16/2023    HCT 29.2 (L) 10/17/2023    HCT 24.2 (L) 10/16/2023     10/17/2023     10/16/2023     BMP:   Lab Results   Component Value Date     10/17/2023     10/16/2023    POTASSIUM 3.6 10/17/2023    POTASSIUM 3.8 10/16/2023    CHLORIDE 104 10/17/2023    CHLORIDE 106 10/16/2023    CO2 25 10/17/2023    CO2 24 10/16/2023    BUN 8.8 10/17/2023    BUN 7.3 (L) 10/16/2023    CR 0.55 10/17/2023    CR 0.57 10/16/2023    GLC 91 10/17/2023    GLC 86 10/16/2023     COAGS: No results found for: \"PTT\", \"INR\", \"FIBR\"  POC: No results found for: \"BGM\", \"HCG\", \"HCGS\"  HEPATIC:   Lab Results   Component Value Date    ALBUMIN 2.8 (L) 10/16/2023    PROTTOTAL 5.6 (L) 10/16/2023    ALT 6 10/16/2023    AST 14 10/16/2023    ALKPHOS 74 10/16/2023    BILITOTAL 0.2 10/16/2023     OTHER:   Lab Results   Component Value Date    LACT 1.6 10/15/2023    KAREN 8.4 (L) 10/17/2023    LIPASE 20 10/14/2023    TSH 0.77 10/15/2023    T4 1.96 (H) 05/31/2023       Anesthesia Plan    ASA Status:  3    NPO Status:  NPO Appropriate    Anesthesia Type: MAC.              Consents    Anesthesia Plan(s) and associated risks, benefits, and realistic alternatives discussed. Questions answered and patient/representative(s) expressed understanding.     - Discussed: Risks, Benefits and Alternatives for BOTH SEDATION and the PROCEDURE were discussed     - Discussed with:  Patient      - Extended Intubation/Ventilatory Support Discussed: No.      - Patient is DNR/DNI Status: No          Postoperative Care    Pain management: Multi-modal analgesia.   PONV prophylaxis: Ondansetron (or other 5HT-3)     Comments:    Other Comments: Pt interviewed in OR due to isolation/contact " precautions for flee infestation.    Propofol sedation    Contact precautions due to h/o flees                Cesar Maldonado MD

## 2023-10-17 NOTE — PROGRESS NOTES
[facility]    Medicine Progress Note - Hospitalist Service    Date of Admission:  10/14/2023    Assessment & Plan   Keyanna Palafox is a 68 year old female with PMH significant for HTN, hypothyroidism and MDD who is admitted on 10/15/2023 due to Sepsis of unknown source, abdominal pain, nausea and vomiting.     Sepsis of Unknown Source   Admit patient. Lactic= 5.1-->2.5. WBC=14.6. AMS/lethargy.   Urinalysis: Unremarkable  CXR no PNA. S/p Cefepime and Vancomycin in ED.   - Discontinue vancomycin and continue with cefepime for now  -Consider switching antibiotics to oral by tomorrow  -Clinically appears stable.  No tachycardia.  WBC is trending down  -BC: NGTD. UC : was not  sent     Abdominal Pain, improving  Abdominal CAT scan reported with thickened gastric body with ulceration noted. Concern for gastritis vs carcinoma.   Appreciate GI consult  S/p upper GI endoscopy on 10/17  -Esophagitis with no bleeding, non-bleeding gastric ulcer with no stigmata of bleeding.  Has been biopsied  -Follow pathology result  -Plan to repeat EGD in 3 to 4 months to reassess gastric ulcer  -If patient develops large-volume hematemesis or melena suggestive of upper GI bleeding, she may benefit from IR angiogram as per gastroenterologist  Continue pantoprazole 40 mg IV twice daily    Nausea & Vomiting  - CT abd/pel concerning for gastritis vs carcinoma.  PRN Zofran. PPI BID.   -Improving    Anemia  - Hb=8.7. CT abd pel reveals thickened distal gastric body with ulceration anteriorly.   -Patient was found to have nonbleeding gastric ulcer.   -Monitor hemoglobin     Leukocytosis  - continue empiric Cefepime for Sepsis of unknown source.  - trending down     DELMI  - Likley prerenal.  Improving.  Avoid nephrotoxic agents.      Elevated troponin  - Improved. Initial troponin= 15-->12. Possible demand ischemia vs DELMI.      Flea infestation  - Contact isolation. Trial of Permethrin lotion.    Hypokalemia  -Replace  per protocol    Chronic neck pain  -Most likely muscle pain  -Tylenol, oral and IV opioid as needed  -Lidocaine patch  -Pain medicine consult        Diet: Snacks/Supplements Adult: Ensure Enlive; With Meals  NPO for Medical/Clinical Reasons Except for: Meds    DVT Prophylaxis: Pneumatic Compression Devices  Pina Catheter: PRESENT, indication: Retention  Lines: None     Cardiac Monitoring: None  Code Status: Full Code      Clinically Significant Risk Factors        # Hypokalemia: Lowest K = 3.3 mmol/L in last 2 days, will replace as needed       # Hypoalbuminemia: Lowest albumin = 2.8 g/dL at 10/16/2023  5:09 AM, will monitor as appropriate       # Hypertension: Noted on problem list            # Financial/Environmental Concerns: other (see comments) (fleas and dirty home concerns)         Disposition Plan      Expected Discharge Date: 10/19/2023    Discharge Delays: Voiding/Eating Trial needed  Procedure Pending (enter procedure & time in comments)  Placement - LTC  PT New Consult needed                Gigi Umanzor MD  Hospitalist Service  [facility]  Securely message with C-sam (more info)  Text page via [employer] Paging/Directory   ______________________________________________________________________    Interval History   Patient awaiting to be called for EGD.  Reports having dry mouth.  No distress noted.  Management plan discussed with the patient and she expressed understanding.    Physical Exam   Vital Signs: Temp: 98.6  F (37  C) Temp src: Oral BP: 110/78 Pulse: 82   Resp: 14 SpO2: 100 % O2 Device: Nasal cannula Oxygen Delivery: 1 LPM  Weight: 108 lbs 7.46 oz    General Appearance: No distress noted  Respiratory: Good air entry bilaterally  Cardiovascular: S1 and S2 well heard, no murmur or gallop  GI: Soft abdomen, no tenderness, normoactive bowel sounds  Skin: Intact and warm       Medical Decision Making       50 MINUTES SPENT BY ME on the date of service doing chart  review, history, exam, documentation & further activities per the note.      Data
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
## 2023-10-17 NOTE — PROGRESS NOTES
Pt a/o x3, forgetful re date, but aware of mo, year, thought it was oct 10. Using call light appropriately. Pt denies pain and nausea. In bed watching tv this am. Turning self. Sat at edge of bed while taking pills. Said gets a little dizzy when walking. Silvana patent, draining. Pt going to surgery for EGD at this time via stretcher, report given to OR RN

## 2023-10-17 NOTE — PLAN OF CARE
NURSING PROGRESS NOTE  Shift Summary      Date: October 17, 2023   [id number]    Neuro/Musculoskeletal:  A&Ox4 but forgetful at times. Flat affect. Calm and pleasant towards writer and other staff. Receptive to all cares.   Cardiac:  Not on TELE monitoring: Apical pulse regular. VSS. Denied and offered no c/o CP.      Respiratory:  LS: CTA, absent of any adventitious sounds. SpO2: 94-98% on 1ltrs O2 via NC. Trialed off of O2 but dropped to 87% while asleep.   GI/: BS: normoactive x4Q. Last BM: 10/14, per Pt report. Pina catheter remained patent. Adequate urine output.   Diet/Appetite:  Tolerating a regular diet. NPO after midnight for EGD in the AM.   Activity:  Bedrest per order  Pain: Denied and offered no c/o pain, absent of non-verbal indicators.   Skin:  No new deficits noted. On contact precautions for flea infestation.   LDAs + Drips/IVF:  PIV to RUE: SL. Abx: IV cefepime   Protocols/Labs:  K+ protocol, redraw in the AM.       Goal Outcome Evaluation:         Problem: Gas Exchange Impaired  Goal: Optimal Gas Exchange  Outcome: Progressing  Intervention: Optimize Oxygenation and Ventilation  Recent Flowsheet Documentation  Taken 10/17/2023 0300 by Jesus Cancino, RN  Head of Bed (HOB) Positioning: HOB at 20-30 degrees  Taken 10/16/2023 1926 by Jesus Cancino, RN  Head of Bed (HOB) Positioning: HOB at 20-30 degrees

## 2023-10-17 NOTE — ANESTHESIA POSTPROCEDURE EVALUATION
Patient: Keyanna Palafox    Procedure: Procedure(s):  ESOPHAGOGASTRODUODENOSCOPY WITH BIOPSIES       Anesthesia Type:  MAC    Note:  Disposition: Inpatient   Postop Pain Control: Uneventful            Sign Out: Well controlled pain   PONV: No   Neuro/Psych: Uneventful            Sign Out: Acceptable/Baseline neuro status   Airway/Respiratory: Uneventful            Sign Out: Acceptable/Baseline resp. status   CV/Hemodynamics: Uneventful            Sign Out: Acceptable CV status; No obvious hypovolemia; No obvious fluid overload   Other NRE: NONE   DID A NON-ROUTINE EVENT OCCUR? No           Last vitals:  Vitals:    10/17/23 1456 10/17/23 1515 10/17/23 1545   BP: 110/78 113/62 125/74   Pulse: 82 83 77   Resp: 14 16 17   Temp: 37  C (98.6  F) 36.3  C (97.4  F) 36.7  C (98  F)   SpO2: 100% 97% 92%       Electronically Signed By: Cesar Maldonado MD  October 17, 2023  4:43 PM

## 2023-10-17 NOTE — ANESTHESIA CARE TRANSFER NOTE
Patient: Keyanna Palafox    Procedure: Procedure(s):  ESOPHAGOGASTRODUODENOSCOPY WITH BIOPSIES       Diagnosis: Anemia, unspecified type [D64.9]  Diagnosis Additional Information: No value filed.    Anesthesia Type:   No value filed.     Note:    Oropharynx: oropharynx clear of all foreign objects and spontaneously breathing  Level of Consciousness: awake  Oxygen Supplementation: nasal cannula  Level of Supplemental Oxygen (L/min / FiO2): 2  Independent Airway: airway patency satisfactory and stable  Dentition: dentition unchanged  Vital Signs Stable: post-procedure vital signs reviewed and stable  Report to RN Given: handoff report given  Patient transferred to: Medical/Surgical Unit  Comments: .  .  Awake and alert. Meets criteria to transfer back to inpatient hospital room for phase 2 post MAC recovery.  Handoff Report: Identifed the Patient, Identified the Reponsible Provider, Reviewed the pertinent medical history, Discussed the surgical course, Reviewed Intra-OP anesthesia mangement and issues during anesthesia, Set expectations for post-procedure period and Allowed opportunity for questions and acknowledgement of understanding      Vitals:  Vitals Value Taken Time   /78 10/17/23 1456   Temp 37  C (98.6  F) 10/17/23 1456   Pulse 82 10/17/23 1456   Resp 14 10/17/23 1456   SpO2 100 % 10/17/23 1456       Electronically Signed By: SAUNDRA Mehta CRNA  October 17, 2023  2:56 PM

## 2023-10-17 NOTE — PLAN OF CARE
Problem: Adult Inpatient Plan of Care  Goal: Absence of Hospital-Acquired Illness or Injury  Outcome: Progressing  Intervention: Identify and Manage Fall Risk  Recent Flowsheet Documentation  Taken 10/17/2023 0915 by Jaja Wild RN  Safety Promotion/Fall Prevention:   activity supervised   assistive device/personal items within reach   clutter free environment maintained   lighting adjusted   nonskid shoes/slippers when out of bed   patient and family education   room organization consistent   safety round/check completed  Intervention: Prevent Skin Injury  Recent Flowsheet Documentation  Taken 10/17/2023 1220 by Jaja Wild RN  Body Position: position changed independently  Taken 10/17/2023 1215 by Jaja Wild RN  Body Position: position changed independently  Taken 10/17/2023 0915 by Jaja Wild RN  Body Position: position changed independently  Taken 10/17/2023 0830 by Jaja Wild RN  Body Position: position changed independently  Intervention: Prevent Infection  Recent Flowsheet Documentation  Taken 10/17/2023 0915 by Jaja Wild RN  Infection Prevention:   equipment surfaces disinfected   hand hygiene promoted   personal protective equipment utilized   rest/sleep promoted   single patient room provided  Goal: Readiness for Transition of Care  Outcome: Progressing     Problem: Gas Exchange Impaired  Goal: Optimal Gas Exchange  Outcome: Progressing  Intervention: Optimize Oxygenation and Ventilation  Recent Flowsheet Documentation  Taken 10/17/2023 1220 by Jaja Wild RN  Head of Bed (HOB) Positioning: HOB at 30 degrees  Taken 10/17/2023 0915 by Jaja Wild RN  Head of Bed (HOB) Positioning: HOB at 20-30 degrees  Taken 10/17/2023 0830 by Jaja Wild RN  Head of Bed (HOB) Positioning: HOB at 20-30 degrees     Problem: Pain Acute  Goal: Optimal Pain Control and Function  Outcome: Progressing  Intervention: Prevent or Manage Pain  Recent Flowsheet Documentation  Taken 10/17/2023  0915 by Jaja Wild, RN  Medication Review/Management: medications reviewed     Problem: Oral Intake Inadequate  Goal: Improved Oral Intake  Outcome: Progressing   Goal Outcome Evaluation:         Pt returned from EGD at approx 1500, awake, alert, cooperative. Staff settled pt back in bed. On 02 1L. Denied pain. Skin inspected earlier this shift and a few scratches noted on forearms, pt states is from her cats. Said her neck at the bottom of her hairline was a little itchy, writer inspected, no abnormalities noted. Reminded pt to use call light for needs. Bed alarm on. Report given to aguila christie.

## 2023-10-18 LAB
ANION GAP SERPL CALCULATED.3IONS-SCNC: 8 MMOL/L (ref 7–15)
BASO+EOS+MONOS # BLD AUTO: ABNORMAL 10*3/UL
BASO+EOS+MONOS NFR BLD AUTO: ABNORMAL %
BASOPHILS # BLD AUTO: 0 10E3/UL (ref 0–0.2)
BASOPHILS NFR BLD AUTO: 0 %
BUN SERPL-MCNC: 13.1 MG/DL (ref 8–23)
CALCIUM SERPL-MCNC: 8 MG/DL (ref 8.8–10.2)
CHLORIDE SERPL-SCNC: 103 MMOL/L (ref 98–107)
CREAT SERPL-MCNC: 0.56 MG/DL (ref 0.51–0.95)
DEPRECATED HCO3 PLAS-SCNC: 25 MMOL/L (ref 22–29)
EGFRCR SERPLBLD CKD-EPI 2021: >90 ML/MIN/1.73M2
EOSINOPHIL # BLD AUTO: 0 10E3/UL (ref 0–0.7)
EOSINOPHIL NFR BLD AUTO: 0 %
ERYTHROCYTE [DISTWIDTH] IN BLOOD BY AUTOMATED COUNT: 16 % (ref 10–15)
GLUCOSE SERPL-MCNC: 91 MG/DL (ref 70–99)
HCT VFR BLD AUTO: 28.7 % (ref 35–47)
HGB BLD-MCNC: 8.6 G/DL (ref 11.7–15.7)
IMM GRANULOCYTES # BLD: 0.1 10E3/UL
IMM GRANULOCYTES NFR BLD: 2 %
LYMPHOCYTES # BLD AUTO: 1.2 10E3/UL (ref 0.8–5.3)
LYMPHOCYTES NFR BLD AUTO: 18 %
MCH RBC QN AUTO: 25.7 PG (ref 26.5–33)
MCHC RBC AUTO-ENTMCNC: 30 G/DL (ref 31.5–36.5)
MCV RBC AUTO: 86 FL (ref 78–100)
MONOCYTES # BLD AUTO: 0.4 10E3/UL (ref 0–1.3)
MONOCYTES NFR BLD AUTO: 6 %
NEUTROPHILS # BLD AUTO: 4.9 10E3/UL (ref 1.6–8.3)
NEUTROPHILS NFR BLD AUTO: 74 %
NRBC # BLD AUTO: 0 10E3/UL
NRBC BLD AUTO-RTO: 0 /100
PLATELET # BLD AUTO: 424 10E3/UL (ref 150–450)
POTASSIUM SERPL-SCNC: 3.3 MMOL/L (ref 3.4–5.3)
POTASSIUM SERPL-SCNC: 3.3 MMOL/L (ref 3.4–5.3)
POTASSIUM SERPL-SCNC: 4.1 MMOL/L (ref 3.4–5.3)
RBC # BLD AUTO: 3.35 10E6/UL (ref 3.8–5.2)
SODIUM SERPL-SCNC: 136 MMOL/L (ref 135–145)
WBC # BLD AUTO: 6.7 10E3/UL (ref 4–11)

## 2023-10-18 PROCEDURE — 36415 COLL VENOUS BLD VENIPUNCTURE: CPT | Performed by: STUDENT IN AN ORGANIZED HEALTH CARE EDUCATION/TRAINING PROGRAM

## 2023-10-18 PROCEDURE — G0008 ADMIN INFLUENZA VIRUS VAC: HCPCS | Performed by: STUDENT IN AN ORGANIZED HEALTH CARE EDUCATION/TRAINING PROGRAM

## 2023-10-18 PROCEDURE — 99233 SBSQ HOSP IP/OBS HIGH 50: CPT | Performed by: INTERNAL MEDICINE

## 2023-10-18 PROCEDURE — 250N000013 HC RX MED GY IP 250 OP 250 PS 637: Performed by: INTERNAL MEDICINE

## 2023-10-18 PROCEDURE — 80048 BASIC METABOLIC PNL TOTAL CA: CPT | Performed by: STUDENT IN AN ORGANIZED HEALTH CARE EDUCATION/TRAINING PROGRAM

## 2023-10-18 PROCEDURE — 84132 ASSAY OF SERUM POTASSIUM: CPT | Performed by: INTERNAL MEDICINE

## 2023-10-18 PROCEDURE — C9113 INJ PANTOPRAZOLE SODIUM, VIA: HCPCS | Mod: JZ | Performed by: FAMILY MEDICINE

## 2023-10-18 PROCEDURE — 120N000001 HC R&B MED SURG/OB

## 2023-10-18 PROCEDURE — 85025 COMPLETE CBC W/AUTO DIFF WBC: CPT | Performed by: STUDENT IN AN ORGANIZED HEALTH CARE EDUCATION/TRAINING PROGRAM

## 2023-10-18 PROCEDURE — 250N000011 HC RX IP 250 OP 636: Mod: JZ | Performed by: INTERNAL MEDICINE

## 2023-10-18 PROCEDURE — 250N000013 HC RX MED GY IP 250 OP 250 PS 637: Performed by: PHYSICIAN ASSISTANT

## 2023-10-18 PROCEDURE — 250N000009 HC RX 250: Performed by: PAIN MEDICINE

## 2023-10-18 PROCEDURE — 258N000003 HC RX IP 258 OP 636: Performed by: INTERNAL MEDICINE

## 2023-10-18 PROCEDURE — 250N000013 HC RX MED GY IP 250 OP 250 PS 637: Performed by: STUDENT IN AN ORGANIZED HEALTH CARE EDUCATION/TRAINING PROGRAM

## 2023-10-18 PROCEDURE — 250N000011 HC RX IP 250 OP 636: Mod: JZ | Performed by: FAMILY MEDICINE

## 2023-10-18 PROCEDURE — 36415 COLL VENOUS BLD VENIPUNCTURE: CPT | Performed by: INTERNAL MEDICINE

## 2023-10-18 PROCEDURE — 84132 ASSAY OF SERUM POTASSIUM: CPT | Performed by: STUDENT IN AN ORGANIZED HEALTH CARE EDUCATION/TRAINING PROGRAM

## 2023-10-18 PROCEDURE — 90662 IIV NO PRSV INCREASED AG IM: CPT | Performed by: STUDENT IN AN ORGANIZED HEALTH CARE EDUCATION/TRAINING PROGRAM

## 2023-10-18 PROCEDURE — 250N000011 HC RX IP 250 OP 636: Performed by: STUDENT IN AN ORGANIZED HEALTH CARE EDUCATION/TRAINING PROGRAM

## 2023-10-18 RX ORDER — CEFDINIR 300 MG/1
300 CAPSULE ORAL EVERY 12 HOURS SCHEDULED
Qty: 10 CAPSULE | Refills: 0 | Status: COMPLETED | OUTPATIENT
Start: 2023-10-18 | End: 2023-10-22

## 2023-10-18 RX ORDER — PANTOPRAZOLE SODIUM 40 MG/1
40 TABLET, DELAYED RELEASE ORAL
Status: DISCONTINUED | OUTPATIENT
Start: 2023-10-18 | End: 2023-10-24 | Stop reason: HOSPADM

## 2023-10-18 RX ORDER — POTASSIUM CHLORIDE 1500 MG/1
20 TABLET, EXTENDED RELEASE ORAL ONCE
Status: COMPLETED | OUTPATIENT
Start: 2023-10-18 | End: 2023-10-18

## 2023-10-18 RX ORDER — FOLIC ACID 1 MG/1
1 TABLET ORAL DAILY
Status: DISCONTINUED | OUTPATIENT
Start: 2023-10-18 | End: 2023-10-24 | Stop reason: HOSPADM

## 2023-10-18 RX ADMIN — POTASSIUM CHLORIDE 20 MEQ: 1500 TABLET, EXTENDED RELEASE ORAL at 08:07

## 2023-10-18 RX ADMIN — LISINOPRIL 20 MG: 20 TABLET ORAL at 08:06

## 2023-10-18 RX ADMIN — CEFEPIME HYDROCHLORIDE 2 G: 2 INJECTION, POWDER, FOR SOLUTION INTRAVENOUS at 08:07

## 2023-10-18 RX ADMIN — DICLOFENAC 2 G: 10 GEL TOPICAL at 13:03

## 2023-10-18 RX ADMIN — INFLUENZA A VIRUS A/VICTORIA/4897/2022 IVR-238 (H1N1) ANTIGEN (FORMALDEHYDE INACTIVATED), INFLUENZA A VIRUS A/DARWIN/9/2021 SAN-010 (H3N2) ANTIGEN (FORMALDEHYDE INACTIVATED), INFLUENZA B VIRUS B/PHUKET/3073/2013 ANTIGEN (FORMALDEHYDE INACTIVATED), AND INFLUENZA B VIRUS B/MICHIGAN/01/2021 ANTIGEN (FORMALDEHYDE INACTIVATED) 0.7 ML: 60; 60; 60; 60 INJECTION, SUSPENSION INTRAMUSCULAR at 10:49

## 2023-10-18 RX ADMIN — IRON SUCROSE 200 MG: 20 INJECTION, SOLUTION INTRAVENOUS at 18:00

## 2023-10-18 RX ADMIN — CEFEPIME HYDROCHLORIDE 2 G: 2 INJECTION, POWDER, FOR SOLUTION INTRAVENOUS at 00:50

## 2023-10-18 RX ADMIN — LEVOTHYROXINE SODIUM 112 MCG: 0.11 TABLET ORAL at 09:07

## 2023-10-18 RX ADMIN — PANTOPRAZOLE SODIUM 40 MG: 40 TABLET, DELAYED RELEASE ORAL at 16:21

## 2023-10-18 RX ADMIN — ATENOLOL 100 MG: 25 TABLET ORAL at 08:05

## 2023-10-18 RX ADMIN — LIDOCAINE: 50 OINTMENT TOPICAL at 13:29

## 2023-10-18 RX ADMIN — POTASSIUM CHLORIDE 20 MEQ: 1500 TABLET, EXTENDED RELEASE ORAL at 16:21

## 2023-10-18 RX ADMIN — LIDOCAINE: 50 OINTMENT TOPICAL at 20:48

## 2023-10-18 RX ADMIN — CEFDINIR 300 MG: 300 CAPSULE ORAL at 10:49

## 2023-10-18 RX ADMIN — ESCITALOPRAM OXALATE 20 MG: 20 TABLET ORAL at 08:06

## 2023-10-18 RX ADMIN — FOLIC ACID 1 MG: 1 TABLET ORAL at 16:21

## 2023-10-18 RX ADMIN — POTASSIUM CHLORIDE 10 MEQ: 750 TABLET, EXTENDED RELEASE ORAL at 08:07

## 2023-10-18 RX ADMIN — CLONIDINE HYDROCHLORIDE 0.2 MG: 0.1 TABLET ORAL at 08:05

## 2023-10-18 RX ADMIN — AMITRIPTYLINE HYDROCHLORIDE 50 MG: 25 TABLET, FILM COATED ORAL at 20:48

## 2023-10-18 RX ADMIN — CEFDINIR 300 MG: 300 CAPSULE ORAL at 20:48

## 2023-10-18 RX ADMIN — PANTOPRAZOLE SODIUM 40 MG: 40 INJECTION, POWDER, FOR SOLUTION INTRAVENOUS at 08:07

## 2023-10-18 ASSESSMENT — ACTIVITIES OF DAILY LIVING (ADL)
ADLS_ACUITY_SCORE: 26
ADLS_ACUITY_SCORE: 27
ADLS_ACUITY_SCORE: 26
ADLS_ACUITY_SCORE: 27
ADLS_ACUITY_SCORE: 27
ADLS_ACUITY_SCORE: 26

## 2023-10-18 NOTE — PROGRESS NOTES
[facility] Progress Note     IMPRESSION:  This is a 69 yo female admitted on 10/15/2023 due to sepsis of unknown source, abdominal pain, nausea, vomiting and anemia.      # Abd pain, nausea, vomiting - Reports symptoms present for 6 weeks.  CT with contrast showing thickened gastric body with ulceration and mildly prominent surrounding adenopathy.  Further eval with EGD once mental status improves.     # Anemia - Hgb 8.7 on admit versus 12.3 on 5/31/2023. No reported hx of GI blood loss. Iron studies low (iron sat 9%, iron level 20), Ferritin and B12 pending  - EGD on 10/17 noted LA Grade C esophagitis without bleeding, gastric ulcer without bleeding. Biopsies pending to rule out malignancy.     # Altered mental status - Appears improved although this is my first day with her. TSH normal. No asterixis flap on exam. Flu and COVID neg. Cultures pending, NGTD.      # Leukocytosis  - improved. Initially WBC 15K, now normalized.  On abx. Work-up in progress. Blood cultures 10/14 NGTD. MRSA negative. Influenza negative. Lactic acid 5.1 at admission -> now 1.6.     # Flea infestation - Permethrin.    RECOMMENDATIONS:  - PPI BID.  - Antiemetics PRN.  - If she has large volume hematemesis or melena, would recommend CTA and IR intervention.     - Await gastric ulcer biopsies  - [facility] will help coordinate outpatient EGD in about 3 months to reassess for healing of gastric ulcer.     GI will sign off at this time. Thank you for consulting us on this pleasant patient. Please call if questions arise or the patient's status changes.       Approximately 15 minutes of total time was spent providing patient care, including patient evaluation, reviewing documentation/test results, and     Jesus Singh PA-C  [facility]  [phone]  ________________________________________________________________________      SUBJECTIVE:    No concerns today.      OBJECTIVE:  /72 (BP Location: Right arm)    "Pulse 85   Temp 98.3  F (36.8  C) (Oral)   Resp 18   Ht 1.575 m (5' 2\")   Wt 49.2 kg (108 lb 7.5 oz)   SpO2 93%   BMI 19.84 kg/m    Temp (24hrs), Av  F (36.7  C), Min:98  F (36.7  C), Max:98  F (36.7  C)    Patient Vitals for the past 72 hrs:   Weight   10/15/23 1849 49.2 kg (108 lb 7.5 oz)       Intake/Output Summary (Last 24 hours) at 10/16/2023 0847  Last data filed at 10/16/2023 0809  Gross per 24 hour   Intake 1471.66 ml   Output 1550 ml   Net -78.34 ml        PHYSICAL EXAM  GEN: Alert, oriented, flat affect, in NAD.    LYMPH: No LAD noted.  HRT: RRR  LUNGS: CTA  ABD:  ND, +BS, no guarding or pain to palpation, no rebound, no HSM.  SKIN: No rash, jaundice or spider angiomata      LABS:  I have reviewed the patient's new clinical lab results:     Recent Labs   Lab Test 10/18/23  0535 10/17/23  0532 10/16/23  0509   WBC 6.7 12.9* 9.8   HGB 8.6* 8.9* 7.2*   MCV 86 86 86    439 425     Recent Labs   Lab Test 10/18/23  0535 10/17/23  0532 10/16/23  1425 10/16/23  0509   POTASSIUM 3.3* 3.6 3.8 3.3*   CHLORIDE 103 104  --  106   CO2 25 25  --  24   BUN 13.1 8.8  --  7.3*   CR 0.56 0.55  --  0.57   ANIONGAP 8 10  --  11     Recent Labs   Lab Test 10/16/23  0509 10/15/23  0713 10/15/23  0107 10/14/23  1950   ALBUMIN 2.8* 3.0*  --  3.2*   BILITOTAL 0.2 0.2  --  0.2   ALT 6 6  --  7   AST 14 10  --  11   PROTEIN  --   --  20*  --    LIPASE  --   --   --  20         IMAGING:  reviewed  EGD 10/17/2023:  Impression:            - Z-line regular, 34 cm from the incisors.                          - LA Grade C esophagitis with no bleeding.                          - Non-bleeding gastric ulcer with no stigmata of                          bleeding. Biopsied.                          - Normal stomach. Biopsied.                          - Normal examined duodenum.   Recommendation:        - Await pathology results.                          - IV PPI bid while in the hospital, PO bid when going                         "  home at least until                          - Would recommend follow up EGD in 3-4 months to                          re-assess gastric ulcer if biopsies negative for                          malignancy. Might need a procedure sooner if there is                          continued clinical concern for malignancy.                          - If she has large volume hematemesis or melena                          suggestive of UGIB, might benefit from IR-angiogram                          given the possibility of the ulcer obscuring a large                          vessel.

## 2023-10-18 NOTE — PROGRESS NOTES
Daily Progress Note        CODE STATUS:  Full Code    10/18/23  Assessment/Plan:    Keyanna Palafox is a 68 year old female with PMH significant for HTN, hypothyroidism and MDD who is admitted on 10/15/2023 due to Sepsis of unknown source, abdominal pain, nausea and vomiting.     Sepsis of Unknown Source  -Admit patient. Lactic= 5.1-->2.5. WBC=14.6. AMS/lethargy.   -Urinalysis: Unremarkable  -CXR no PNA. S/p Cefepime and Vancomycin in ED.   -Empirically treated with IV vancomycin/cefepime on admission. Vanco discontinued. Cefepime switched to PO Cefdinir today  -Clinically appears stable.  No tachycardia.  WBC is trending down  -BC: NGTD. UC : was not  sent     Abdominal Pain, improving  -Abdominal CAT scan reported with thickened gastric body with ulceration noted. Concern for gastritis vs carcinoma.   -Appreciate GI consult. S/p upper GI endoscopy on 10/17. Findings- esophagitis with no bleeding, non-bleeding gastric ulcer with no stigmata of bleeding.  Has been biopsied, pathology result pending  -Plan to repeat EGD in 3 to 4 months to reassess gastric ulcer  -Continue pantoprazole 40 mg IV twice daily     Nausea & Vomiting  - CT abd/pel concerning for gastritis vs carcinoma.  PRN Zofran. PPI BID.   -Improving     Anemia  -Hb=8.7 on admission. It was 12.3 in June 2023. CT abd pel reveals thickened distal gastric body with ulceration anteriorly.   -Patient was found to have nonbleeding gastric ulcer.   -Monitor hemoglobin  -Iron study reveals iron deficiency. Will infuse IV iron 200mg x 1 today  -Borderline folate deficiency noted. Started PO folic acid     Leukocytosis  -Resolved  -Antibiotics as above     DELMI  -Likely prerenal.  Improved.  Avoid nephrotoxic agents.      Elevated troponin  - Improved. Initial troponin= 15-->12. Possible demand ischemia vs DELMI.      Flea infestation  - Contact isolation. Trial of Permethrin lotion.     Hypokalemia  - Replace per protocol     Chronic neck pain  -Most likely  muscle pain  -Tylenol, oral and IV opioid as needed  -Lidocaine patch  -Pain medicine consult appreciated      Generalized weakness  -PT/OT eval    Diet: Regular    DVT Prophylaxis: Pneumatic Compression Devices  Pina Catheter: PRESENT, indication: Retention  Lines: None     Cardiac Monitoring: None  Code Status: Full Code      Disposition; Home vs TCU in 1-2 days  Barrier to discharge; Clinical progress     LOS: 3 days     Subjective:  Interval History: Patient seen and examined. Notes, labs, imaging reports personally reviewed. Patient is new to me today. No new complaints overnight. Reports no black stools or hematochezia.     Review of Systems:   As mentioned in subjective.    Patient Active Problem List   Diagnosis    Viral Syndrome    Acute Bronchitis    Muscle Aches, Generalized (Myalgias)    Pain During Urination (Dysuria)    Closed Fracture Of The Distal End Of The Radius    Acute Sinusitis    Allergies    Abnormal Weight Loss    Hypothyroidism    Migraine Headache    Impingement Of The Right Shoulder    Hypertension    Internal Derangement Of Left Knee    Syncope    Moderate episode of recurrent major depressive disorder (H)    Periumbilical abdominal pain    Anemia, unspecified type       Scheduled Meds:   amitriptyline  50 mg Oral At Bedtime    [Held by provider] amLODIPine  10 mg Oral Daily    atenolol  100 mg Oral Daily    cefdinir  300 mg Oral Q12H [facility] (08/20)    cloNIDine  0.2 mg Oral Daily    diclofenac  2 g Topical 4x Daily    escitalopram  20 mg Oral Daily    folic acid  1 mg Oral Daily    iron sucrose  200 mg Intravenous Once    levothyroxine  112 mcg Oral Daily    lidocaine   Topical TID    lisinopril  20 mg Oral BID    pantoprazole  40 mg Oral BID AC    potassium chloride ER  10 mEq Oral Daily    potassium chloride  20 mEq Oral Once    sodium chloride (PF)  3 mL Intracatheter Q8H     Continuous Infusions:  PRN Meds:.acetaminophen **OR** acetaminophen, lidocaine 4%, lidocaine (buffered or not  buffered), naloxone **OR** naloxone **OR** naloxone **OR** naloxone, oxyCODONE, oxyCODONE IR, sodium chloride (PF), tiZANidine    Objective:  Vital signs in last 24 hours:  Temp:  [97.8  F (36.6  C)-98.4  F (36.9  C)] 98.4  F (36.9  C)  Pulse:  [66-85] 68  Resp:  [16-18] 18  BP: ()/(50-72) 90/50  SpO2:  [91 %-94 %] 91 %        Intake/Output Summary (Last 24 hours) at 10/18/2023 1602  Last data filed at 10/18/2023 1457  Gross per 24 hour   Intake 360 ml   Output 650 ml   Net -290 ml       Physical Exam:    General: Not in obvious distress.  HEENT: NC, AT. Pale conjunctiva   Chest: Clear to auscultation bilaterally  Heart: S1S2 normal, regular. No M/R/G  Abdomen: Soft. NT, ND. Bowel sounds- active.  Extremities: No legs swelling  Neuro: Alert and awake, grossly non-focal      Lab Results:(I have personally reviewed the results)    Recent Results (from the past 24 hour(s))   Basic metabolic panel    Collection Time: 10/18/23  5:35 AM   Result Value Ref Range    Sodium 136 135 - 145 mmol/L    Potassium 3.3 (L) 3.4 - 5.3 mmol/L    Chloride 103 98 - 107 mmol/L    Carbon Dioxide (CO2) 25 22 - 29 mmol/L    Anion Gap 8 7 - 15 mmol/L    Urea Nitrogen 13.1 8.0 - 23.0 mg/dL    Creatinine 0.56 0.51 - 0.95 mg/dL    GFR Estimate >90 >60 mL/min/1.73m2    Calcium 8.0 (L) 8.8 - 10.2 mg/dL    Glucose 91 70 - 99 mg/dL   CBC with platelets and differential    Collection Time: 10/18/23  5:35 AM   Result Value Ref Range    WBC Count 6.7 4.0 - 11.0 10e3/uL    RBC Count 3.35 (L) 3.80 - 5.20 10e6/uL    Hemoglobin 8.6 (L) 11.7 - 15.7 g/dL    Hematocrit 28.7 (L) 35.0 - 47.0 %    MCV 86 78 - 100 fL    MCH 25.7 (L) 26.5 - 33.0 pg    MCHC 30.0 (L) 31.5 - 36.5 g/dL    RDW 16.0 (H) 10.0 - 15.0 %    Platelet Count 424 150 - 450 10e3/uL    % Neutrophils 74 %    % Lymphocytes 18 %    % Monocytes 6 %    Mids % (Monos, Eos, Basos)      % Eosinophils 0 %    % Basophils 0 %    % Immature Granulocytes 2 %    NRBCs per 100 WBC 0 <1 /100    Absolute  "Neutrophils 4.9 1.6 - 8.3 10e3/uL    Absolute Lymphocytes 1.2 0.8 - 5.3 10e3/uL    Absolute Monocytes 0.4 0.0 - 1.3 10e3/uL    Mids Abs (Monos, Eos, Basos)      Absolute Eosinophils 0.0 0.0 - 0.7 10e3/uL    Absolute Basophils 0.0 0.0 - 0.2 10e3/uL    Absolute Immature Granulocytes 0.1 <=0.4 10e3/uL    Absolute NRBCs 0.0 10e3/uL   Potassium    Collection Time: 10/18/23 11:53 AM   Result Value Ref Range    Potassium 3.3 (L) 3.4 - 5.3 mmol/L       All laboratory and imaging data in the past 24 hours reviewed  Serum Glucose range:   Recent Labs   Lab 10/18/23  0535 10/17/23  0532 10/16/23  0509 10/15/23  0713   GLC 91 91 86 109*     ABG: No lab results found in last 7 days.  CBC:   Recent Labs   Lab 10/18/23  0535 10/17/23  0532 10/16/23  0509 10/15/23  0713 10/14/23  1950   WBC 6.7 12.9* 9.8 15.2* 14.6*   HGB 8.6* 8.9* 7.2* 8.4* 8.7*   HCT 28.7* 29.2* 24.2* 27.8* 28.7*   MCV 86 86 86 86 86    439 425 532* 603*   NEUTROPHIL 74  --   --  85 85   LYMPH 18  --   --  7 9   MONOCYTE 6  --   --  5 4   EOSINOPHIL 0  --   --  0 0     Chemistry:   Recent Labs   Lab 10/18/23  1153 10/18/23  0535 10/17/23  0532 10/16/23  1425 10/16/23  0509 10/15/23  0713 10/14/23  1950   NA  --  136 139  --  141 140 138   POTASSIUM 3.3* 3.3* 3.6   < > 3.3* 4.4 4.1   CHLORIDE  --  103 104  --  106 105 97*   CO2  --  25 25  --  24 20* 17*   BUN  --  13.1 8.8  --  7.3* 22.0 29.7*   CR  --  0.56 0.55  --  0.57 0.86 1.13*   GFRESTIMATED  --  >90 >90  --  >90 73 52*   KAREN  --  8.0* 8.4*  --  8.0* 8.0* 9.0   PROTTOTAL  --   --   --   --  5.6* 6.1* 6.6   ALBUMIN  --   --   --   --  2.8* 3.0* 3.2*   AST  --   --   --   --  14 10 11   ALT  --   --   --   --  6 6 7   ALKPHOS  --   --   --   --  74 83 95   BILITOTAL  --   --   --   --  0.2 0.2 0.2    < > = values in this interval not displayed.     Coags:  No results for input(s): \"INR\", \"PROTIME\", \"PTT\" in the last 168 hours.    Invalid input(s): \"APTT\"  Cardiac Markers:  No results for input(s): " "\"CKTOTAL\", \"TROPONINI\" in the last 168 hours.       CT Abdomen Pelvis w Contrast    Result Date: 10/14/2023  EXAM: CT ABDOMEN PELVIS W CONTRAST LOCATION: [facility] DATE: 10/14/2023 INDICATION: Abdominal pain, vomiting. COMPARISON: None. TECHNIQUE: CT scan of the abdomen and pelvis was performed following injection of IV contrast. Multiplanar reformats were obtained. Dose reduction techniques were used. CONTRAST: 75 mL Isovue 370. FINDINGS: LOWER CHEST: Cylindrical bronchiectasis in the lung bases. Mild bronchiectasis and consolidation within the lingula. HEPATOBILIARY: Normal. PANCREAS: Normal. SPLEEN: Normal. ADRENAL GLANDS: Normal. KIDNEYS/BLADDER: Tiny nonobstructive stone left mid kidney. BOWEL: Wall thickening distal gastric body with ulceration anteriorly. A few mildly prominent surrounding lymph nodes. Diagnostic considerations include carcinoma versus gastritis. Normal appendix. LYMPH NODES: Normal. VASCULATURE: Unremarkable. PELVIC ORGANS: Normal. MUSCULOSKELETAL: Normal.     IMPRESSION: 1.  Wall thickening distal gastric body with ulceration anteriorly. There are a few mildly prominent surrounding lymph nodes. Diagnostic considerations include carcinoma versus gastritis. 2.  Tiny nonobstructing stone within the left kidney. No ureteric stone or hydronephrosis. 3.  Mild cylindrical bronchiectasis in the lung bases and lingula. Mild consolidation in the lingula.    XR Chest Port 1 View    Result Date: 10/14/2023  EXAM: XR CHEST PORT 1 VIEW LOCATION: [facility] DATE: 10/14/2023 INDICATION: cough elevated lactic acid evaluate for pneumonia COMPARISON: 10/03/2016     IMPRESSION: Heart size within normal limits for portable technique. Given patient's leftward rotation. Calcified aortic arch. Mildly hyperinflated lungs. No focal airspace consolidation. No visible pneumothorax or pleural effusion. Osteopenia.      Latest radiology report personally " reviewed.    Note created using dragon voice recognition software so sounds alike errors may have escaped editing.      10/18/2023   Nico Vela MD  Hospitalist, [facility]  Pager: [phone]

## 2023-10-18 NOTE — UTILIZATION REVIEW
Admission Status; Secondary Review Determination for insurance denial  Under the authority of the Utilization Management Committee, the utilization review process indicated a secondary review on Keyanna Palafox. The review outcome is based on review of the medical records, discussions with staff, and applying clinical experience noted on the date of the review.   (x) Inpatient Status Appropriate - This patient's medical care is consistent with medical management for inpatient care and reasonable inpatient medical practice.     RATIONALE FOR DETERMINATION   68-year-old female admitted with lethargy, abdominal pain, nausea and vomiting and sepsis of unknown source.  On admission lactate elevated at 5.1 with DELMI and had leukocytosis up to 15.2.  Started on IV cefepime and vancomycin.  Vancomycin discontinued yesterday.  GI consulted regarding abdominal pain and abnormal CT results showing gastric body ulceration with concern for gastritis versus carcinoma.  Underwent upper GI endoscopy and found to have gastric ulcer with esophagitis.  Continue to monitor for hematemesis or melena in which case she would need IR angiogram.  Also anemic with hemoglobin as low as 7.2.  Also has flea infestation requiring contact isolation and permethrin.  Has chronic neck pain which has been uncontrolled and pain team has been consulted and is following.    At the time of admission with the information available to the attending physician more than 2 nights Hospital complex care was anticipated, based on patient risk of adverse outcome if treated as outpatient and complex care required. Inpatient admission is appropriate based on the Medicare guidelines.   The information on this document is developed by the utilization review team in order for the business office to ensure compliance. This only denotes the appropriateness of proper admission status and does not reflect the quality of care rendered.   The definitions of Inpatient  Status and Observation Status used in making the determination above are those provided in the CMS Coverage Manual, Chapter 1 and Chapter 6, section 70.4.   Sincerely,   Speedy Gabriel MD  Utilization Review  Physician Advisor  [facility]

## 2023-10-18 NOTE — PROGRESS NOTES
CLINICAL NUTRITION SERVICES     Regular diet  Ate 25% of dinner per flowsheets (ordered soup, juice, jello, cookie)    Called and spoke with pt, better appetite and intake now, was ok PTA. Typically eats 2 meals per day, states has been eating the same or more (question accuracy of this as pt seems somewhat confused). Does drink Ensure, ok with receiving just 1 per day on lunch tray prefers chocolate.    Wt Readings from Last 20 Encounters:   10/15/23 49.2 kg (108 lb 7.5 oz)   05/31/23 53.1 kg (117 lb)   01/28/22 52.8 kg (116 lb 8 oz)   03/26/19 38.9 kg (85 lb 12.8 oz)   12/18/17 46.6 kg (102 lb 11.2 oz)   10/03/16 50.1 kg (110 lb 8 oz)   10/26/15 47 kg (103 lb 9.6 oz)   02/06/15 41.7 kg (92 lb)   01/15/15 41.2 kg (90 lb 12 oz)     Weight loss of 9# (7.7%) x 4+ months    MALNUTRITION:  % Weight Loss:  Weight loss does not meet criteria for malnutrition   % Intake:  Decreased intake does not meet criteria for malnutrition   Subcutaneous Fat Loss:  unable  Muscle Loss:  unable  Fluid Retention:  None noted    Malnutrition Diagnosis: Unable to determine due to need NFPE, continue to assess    Will continue to follow

## 2023-10-18 NOTE — PLAN OF CARE
Problem: Oral Intake Inadequate  Goal: Improved Oral Intake  Outcome: Not Progressing     Problem: Pain Acute  Goal: Optimal Pain Control and Function  Outcome: Progressing  Intervention: Develop Pain Management Plan  Recent Flowsheet Documentation  Taken 10/17/2023 1732 by Rocio Schneider RN  Pain Management Interventions: medication (see MAR)     Problem: Gas Exchange Impaired  Goal: Optimal Gas Exchange  Outcome: Progressing  Intervention: Optimize Oxygenation and Ventilation  Recent Flowsheet Documentation  Taken 10/17/2023 1732 by Rocio Schneider RN  Head of Bed (HOB) Positioning: HOB at 15 degrees     Goal Outcome Evaluation:       Pt presented to the hospital on 10/14 with sepsis of unknown source. Blood cultures showing no growth. Receiving IV abx in the form of Maxipime. WBC is trending down. Pt is on contact precautions for flea infestation. Abdominal CT concerning for gastritis vs carcinoma. GI consulting. EGD performed today which revealed a non bleeding gastric ulcer. Biopsies are pending. She is A&O x4. Affect is flat. Pain team consulting for complaints of right sided neck pain. Received scheduled Voltaren gel and 650 mg of Tylenol at 1735, which were effective. Pt was weaned to RA this shift. Maintaining O2 sats at >92%. On continuous pulse ox. Hgb 8.9. She has been lethargic. Sleeping between cares this shift. Tolerating a regular diet. Appetite is poor. On K+ protocol. Recheck in AM. Pina catheter in place for urinary retention. Patent and draining moriah colored urine.

## 2023-10-18 NOTE — PLAN OF CARE
"  Problem: Adult Inpatient Plan of Care  Goal: Plan of Care Review  Description: The Plan of Care Review/Shift note should be completed every shift.  The Outcome Evaluation is a brief statement about your assessment that the patient is improving, declining, or no change.  This information will be displayed automatically on your shift  note.  Outcome: Progressing  Goal: Patient-Specific Goal (Individualized)  Description: You can add care plan individualizations to a care plan. Examples of Individualization might be:  \"Parent requests to be called daily at 9am for status\", \"I have a hard time hearing out of my right ear\", or \"Do not touch me to wake me up as it startles  me\".  Outcome: Progressing  Goal: Absence of Hospital-Acquired Illness or Injury  Outcome: Progressing  Intervention: Identify and Manage Fall Risk  Recent Flowsheet Documentation  Taken 10/18/2023 0840 by Adeola Flowers, RN  Safety Promotion/Fall Prevention:   lighting adjusted   room organization consistent  Intervention: Prevent Skin Injury  Recent Flowsheet Documentation  Taken 10/18/2023 0840 by Adeola Flowers, RN  Body Position: position changed independently  Goal: Optimal Comfort and Wellbeing  Outcome: Progressing  Goal: Readiness for Transition of Care  Outcome: Progressing     Problem: Pain Acute  Goal: Optimal Pain Control and Function  Outcome: Progressing  Intervention: Prevent or Manage Pain  Recent Flowsheet Documentation  Taken 10/18/2023 0840 by Adeola Flowers RN  Medication Review/Management: medications reviewed     Problem: Oral Intake Inadequate  Goal: Improved Oral Intake  Outcome: Progressing   Goal Outcome Evaluation:  Pt ate breakfast but sleepy during lunch. Fluids encouraged as able. minimal c/o neck pain. Refused neck pain creams this am, accepted afternoon dose. Remains on RA at this time.                       "

## 2023-10-18 NOTE — PLAN OF CARE
Problem: Adult Inpatient Plan of Care  Goal: Absence of Hospital-Acquired Illness or Injury  Intervention: Identify and Manage Fall Risk  Recent Flowsheet Documentation  Taken 10/18/2023 1700 by Fawn Jauregui RN  Safety Promotion/Fall Prevention:   lighting adjusted   room organization consistent  Intervention: Prevent Infection  Recent Flowsheet Documentation  Taken 10/18/2023 1700 by Fawn Jauregui RN  Infection Prevention: single patient room provided     Problem: Pain Acute  Goal: Optimal Pain Control and Function  Intervention: Prevent or Manage Pain  Recent Flowsheet Documentation  Taken 10/18/2023 1700 by Fawn Jauregui RN  Sensory Stimulation Regulation: television on  Medication Review/Management: medications reviewed     Problem: Oral Intake Inadequate  Goal: Improved Oral Intake  Intervention: Promote and Optimize Oral Intake  Recent Flowsheet Documentation  Taken 10/18/2023 1700 by Fawn Jauregui RN  Nutrition Interventions: supplemental drinks provided   Goal Outcome Evaluation: Pt denied acute pain.  Discomfort right neck relieved with Lidocaine ointment and massage.  Pt ate a small dinner.  She continues to have flat affect.  Pina patent draining moriah urine.  Pt has not been out of bed tonight.  Pt had a dose of IV Iron and Potassium 20 mEq replacement for a 3.3 at 12:30.  Follow up Potassium is 4.1 at 2030.  Tolerated PO Cefdinir this shift.  Lisinopril not given for low BP.  Protonix PO given this shift.

## 2023-10-19 ENCOUNTER — APPOINTMENT (OUTPATIENT)
Dept: PHYSICAL THERAPY | Facility: HOSPITAL | Age: 68
DRG: 871 | End: 2023-10-19
Attending: INTERNAL MEDICINE
Payer: COMMERCIAL

## 2023-10-19 ENCOUNTER — APPOINTMENT (OUTPATIENT)
Dept: OCCUPATIONAL THERAPY | Facility: HOSPITAL | Age: 68
DRG: 871 | End: 2023-10-19
Attending: INTERNAL MEDICINE
Payer: COMMERCIAL

## 2023-10-19 LAB
CREAT SERPL-MCNC: 0.61 MG/DL (ref 0.51–0.95)
EGFRCR SERPLBLD CKD-EPI 2021: >90 ML/MIN/1.73M2
HGB BLD-MCNC: 9.2 G/DL (ref 11.7–15.7)
PATH REPORT.COMMENTS IMP SPEC: NORMAL
PATH REPORT.FINAL DX SPEC: NORMAL
PATH REPORT.GROSS SPEC: NORMAL
PATH REPORT.MICROSCOPIC SPEC OTHER STN: NORMAL
PATH REPORT.RELEVANT HX SPEC: NORMAL
PHOTO IMAGE: NORMAL
POTASSIUM SERPL-SCNC: 4.2 MMOL/L (ref 3.4–5.3)

## 2023-10-19 PROCEDURE — 97161 PT EVAL LOW COMPLEX 20 MIN: CPT | Mod: GP

## 2023-10-19 PROCEDURE — 250N000013 HC RX MED GY IP 250 OP 250 PS 637: Performed by: PHYSICIAN ASSISTANT

## 2023-10-19 PROCEDURE — 250N000013 HC RX MED GY IP 250 OP 250 PS 637: Performed by: INTERNAL MEDICINE

## 2023-10-19 PROCEDURE — 97535 SELF CARE MNGMENT TRAINING: CPT | Mod: GO

## 2023-10-19 PROCEDURE — 97116 GAIT TRAINING THERAPY: CPT | Mod: GP

## 2023-10-19 PROCEDURE — 85018 HEMOGLOBIN: CPT | Performed by: INTERNAL MEDICINE

## 2023-10-19 PROCEDURE — 120N000001 HC R&B MED SURG/OB

## 2023-10-19 PROCEDURE — 250N000013 HC RX MED GY IP 250 OP 250 PS 637: Performed by: STUDENT IN AN ORGANIZED HEALTH CARE EDUCATION/TRAINING PROGRAM

## 2023-10-19 PROCEDURE — 88305 TISSUE EXAM BY PATHOLOGIST: CPT | Mod: 26 | Performed by: PATHOLOGY

## 2023-10-19 PROCEDURE — 84132 ASSAY OF SERUM POTASSIUM: CPT | Performed by: INTERNAL MEDICINE

## 2023-10-19 PROCEDURE — 99232 SBSQ HOSP IP/OBS MODERATE 35: CPT | Performed by: INTERNAL MEDICINE

## 2023-10-19 PROCEDURE — 258N000003 HC RX IP 258 OP 636: Performed by: INTERNAL MEDICINE

## 2023-10-19 PROCEDURE — 250N000011 HC RX IP 250 OP 636: Mod: JZ | Performed by: INTERNAL MEDICINE

## 2023-10-19 PROCEDURE — 97165 OT EVAL LOW COMPLEX 30 MIN: CPT | Mod: GO

## 2023-10-19 PROCEDURE — 82565 ASSAY OF CREATININE: CPT | Performed by: FAMILY MEDICINE

## 2023-10-19 PROCEDURE — 97530 THERAPEUTIC ACTIVITIES: CPT | Mod: GO

## 2023-10-19 PROCEDURE — 97110 THERAPEUTIC EXERCISES: CPT | Mod: GP

## 2023-10-19 PROCEDURE — 36415 COLL VENOUS BLD VENIPUNCTURE: CPT | Performed by: INTERNAL MEDICINE

## 2023-10-19 RX ADMIN — AMITRIPTYLINE HYDROCHLORIDE 50 MG: 25 TABLET, FILM COATED ORAL at 20:51

## 2023-10-19 RX ADMIN — PANTOPRAZOLE SODIUM 40 MG: 40 TABLET, DELAYED RELEASE ORAL at 16:06

## 2023-10-19 RX ADMIN — POTASSIUM CHLORIDE 10 MEQ: 750 TABLET, EXTENDED RELEASE ORAL at 09:20

## 2023-10-19 RX ADMIN — DICLOFENAC 2 G: 10 GEL TOPICAL at 11:40

## 2023-10-19 RX ADMIN — DICLOFENAC 2 G: 10 GEL TOPICAL at 20:53

## 2023-10-19 RX ADMIN — LISINOPRIL 20 MG: 20 TABLET ORAL at 09:19

## 2023-10-19 RX ADMIN — LIDOCAINE: 50 OINTMENT TOPICAL at 15:30

## 2023-10-19 RX ADMIN — ESCITALOPRAM OXALATE 20 MG: 20 TABLET ORAL at 09:21

## 2023-10-19 RX ADMIN — DICLOFENAC 2 G: 10 GEL TOPICAL at 16:06

## 2023-10-19 RX ADMIN — CEFDINIR 300 MG: 300 CAPSULE ORAL at 20:51

## 2023-10-19 RX ADMIN — CLONIDINE HYDROCHLORIDE 0.2 MG: 0.1 TABLET ORAL at 09:20

## 2023-10-19 RX ADMIN — CEFDINIR 300 MG: 300 CAPSULE ORAL at 09:20

## 2023-10-19 RX ADMIN — PANTOPRAZOLE SODIUM 40 MG: 40 TABLET, DELAYED RELEASE ORAL at 09:25

## 2023-10-19 RX ADMIN — IRON SUCROSE 200 MG: 20 INJECTION, SOLUTION INTRAVENOUS at 11:31

## 2023-10-19 RX ADMIN — FOLIC ACID 1 MG: 1 TABLET ORAL at 09:20

## 2023-10-19 RX ADMIN — DICLOFENAC 2 G: 10 GEL TOPICAL at 09:23

## 2023-10-19 RX ADMIN — LEVOTHYROXINE SODIUM 112 MCG: 0.11 TABLET ORAL at 05:52

## 2023-10-19 ASSESSMENT — ACTIVITIES OF DAILY LIVING (ADL)
ADLS_ACUITY_SCORE: 27

## 2023-10-19 NOTE — PROGRESS NOTES
Occupational Therapy      10/19/23 0945   Appointment Info   Signing Clinician's Name / Credentials (OT) Chloe Belle OTR/L   Living Environment   People in Home alone   Current Living Arrangements house   Home Accessibility stairs to enter home   Number of Stairs, Main Entrance 7   Stair Railings, Main Entrance railings safe and in good condition   Transportation Anticipated family or friend will provide   Self-Care   Usual Activity Tolerance good   Current Activity Tolerance moderate   Regular Exercise No   Equipment Currently Used at Home other (see comments)  (Pt normally doesnt use device but has fww and cane at home)   Fall history within last six months no   Activity/Exercise/Self-Care Comment Ind. w/ ADL/IADL routine   General Information   Onset of Illness/Injury or Date of Surgery 10/14/23   Referring Physician Gigi Umanzor MD   Additional Occupational Profile Info/Pertinent History of Current Problem 68 year old female with PMH significant for HTN, hypothyroidism and MDD who is admitted on 10/15/2023 due to Sepsis of unknown source, abdominal pain, nausea and vomiting- flea infestation.   Existing Precautions/Restrictions fall   Cognitive Status Examination   Orientation Status orientation to person, place and time   Range of Motion Comprehensive   General Range of Motion no range of motion deficits identified   Transfers   Transfers sit-stand transfer   Sit-Stand Transfer   Sit-Stand Rogers (Transfers) contact guard;supervision;verbal cues   Assistive Device (Sit-Stand Transfers) walker, front-wheeled   Balance   Balance Assessment standing dynamic balance   Activities of Daily Living   BADL Assessment/Intervention lower body dressing   Lower Body Dressing Assessment/Training   Position (Lower Body Dressing) unsupported sitting   Comment, (Lower Body Dressing) Pt requires supervision/SBA for dressing tasks d/t increased dizziness   Rogers Level (Lower Body Dressing) socks;verbal  cues;supervision   Clinical Impression   Criteria for Skilled Therapeutic Interventions Met (OT) Yes, treatment indicated   OT Diagnosis decreased act. tolerance   OT Problem List-Impairments impacting ADL problems related to;activity tolerance impaired;balance;mobility;strength   Assessment of Occupational Performance 1-3 Performance Deficits   Identified Performance Deficits endurance/act. tolerance, toileting, dressing   Planned Therapy Interventions (OT) ADL retraining;balance training;strengthening;transfer training   Clinical Decision Making Complexity (OT) problem focused assessment/low complexity   Risk & Benefits of therapy have been explained evaluation/treatment results reviewed;patient   OT Total Evaluation Time   OT Eval, Low Complexity Minutes (12656) 10   OT Goals   Therapy Frequency (OT) Daily   OT Predicted Duration/Target Date for Goal Attainment 10/26/23   OT Goals Hygiene/Grooming;Transfers;Toilet Transfer/Toileting   OT: Hygiene/Grooming modified independent;while standing   OT: Transfer Modified independent;with assistive device   OT: Toilet Transfer/Toileting Modified independent;using adaptive equipment   Interventions   Interventions Quick Adds Therapeutic Activity   Self-Care/Home Management   Self-Care/Home Mgmt/ADL, Compensatory, Meal Prep Minutes (61283) 10   Treatment Detail/Skilled Intervention Pt reporting increased dizziness since hospitalization- pt additional STS SBA/CGA w/ fww no LOB noted- pt moving well w/in room w/ SBA w/ fww- pt requring occasional CGA d/t reports of mild dizziness w/ functional ambulation w/ fww- no LOB noted, BP intially 92/50s at end of session 102/58- STS toileting SBA fixed GB, up in recliner at end of session alarm on call light w/in reach no further c/o dizziness BLE elevated.   Therapeutic Activities   Therapeutic Activity Minutes (89711) 8   Treatment Detail/Skilled Intervention Pt engaged in dynamic reaching/functional standing balance act. w/  CGA/SBA no LOB noted- pt reporting she needed 1 seated rest break d/t dizziness BP assessed ~102/58,   OT Discharge Planning   OT Plan monitor dizziness, toileting, dressing, endurance   OT Discharge Recommendation (DC Rec) home with assist   OT Rationale for DC Rec Pt moving well w/ SBA/occasional CGA w/ fww d/t reports of mild dizziness- resolved w/ seated rest breaks. Pt reporting she does live alone but her grandson may be able to stay w/ her upon d/c- pending progress w/ ADL ind/mobility pt may be safe for d/c home w/ increased assist w/in home.   OT Brief overview of current status SBA/CGA fww monitor BP   Total Session Time   Timed Code Treatment Minutes 18   Total Session Time (sum of timed and untimed services) 28

## 2023-10-19 NOTE — PLAN OF CARE
Problem: Pain Acute  Goal: Optimal Pain Control and Function  Outcome: Progressing  Intervention: Prevent or Manage Pain  Recent Flowsheet Documentation  Taken 10/19/2023 0040 by Shy Chin, RN  Medication Review/Management: medications reviewed     Problem: Adult Inpatient Plan of Care  Goal: Optimal Comfort and Wellbeing  Outcome: Progressing     Goal Outcome Evaluation:  Blood pressure at midnight vitals check was 89 SBP, recheck by RN ws 92 SBP.  Pt stated she didn't currently  feels dizzy but would sometimes feel dizzy when sitting up.  Blood pressure improved at morning spot check.  Pina in place with adequate output but urine remains darker moriah.

## 2023-10-19 NOTE — PLAN OF CARE
Patient was alert and oriented this shift.  She was having minimal pain in her posterior neck region.  Voltaren ointment was helpful.  IV iron was given as ordered.  Patient had PT/OT evaluations.  Patient appeared to have generalized weakness and needs assist of 1 for transfers.  Vitals stable. Pina discontinued around 1130 this shift.  PVR bladders scans are still pending.   Fawn Quezada RN     Problem: Adult Inpatient Plan of Care  Goal: Absence of Hospital-Acquired Illness or Injury  Outcome: Progressing  Intervention: Prevent Skin Injury  Recent Flowsheet Documentation  Taken 10/19/2023 0900 by Fawn Quezada RN  Body Position: position changed independently  Goal: Optimal Comfort and Wellbeing  Outcome: Progressing  Intervention: Monitor Pain and Promote Comfort  Recent Flowsheet Documentation  Taken 10/19/2023 0900 by Fawn Quezada RN  Pain Management Interventions: (volteren ointment applied to back of neck) medication (see MAR)     Problem: Gas Exchange Impaired  Goal: Optimal Gas Exchange  Outcome: Progressing     Problem: Pain Acute  Goal: Optimal Pain Control and Function  Outcome: Progressing  Intervention: Develop Pain Management Plan  Recent Flowsheet Documentation  Taken 10/19/2023 0900 by Fawn Quezada RN  Pain Management Interventions: (volteren ointment applied to back of neck) medication (see MAR)     Problem: Oral Intake Inadequate  Goal: Improved Oral Intake  Outcome: Progressing   Goal Outcome Evaluation:

## 2023-10-19 NOTE — PLAN OF CARE
Problem: Adult Inpatient Plan of Care  Goal: Absence of Hospital-Acquired Illness or Injury  Intervention: Identify and Manage Fall Risk  Recent Flowsheet Documentation  Taken 10/19/2023 1602 by Fawn Jauregui RN  Safety Promotion/Fall Prevention:   lighting adjusted   room organization consistent  Intervention: Prevent Infection  Recent Flowsheet Documentation  Taken 10/19/2023 1602 by Fawn Jauregui RN  Infection Prevention: single patient room provided     Problem: Pain Acute  Goal: Optimal Pain Control and Function  Intervention: Prevent or Manage Pain  Recent Flowsheet Documentation  Taken 10/19/2023 1602 by Fawn Jauregui RN  Sensory Stimulation Regulation: television on  Medication Review/Management: medications reviewed     Problem: Oral Intake Inadequate  Goal: Improved Oral Intake  Intervention: Promote and Optimize Oral Intake  Recent Flowsheet Documentation  Taken 10/19/2023 1602 by Fawn Jauregui RN  Nutrition Interventions: supplemental drinks provided   Goal Outcome Evaluation:  Pt has ongoing flat affect refusing any activity.  She continues to have low Bps so Lisinopril was held.  Cefdinir PO administered.  Denies GI distress but PO intake is poor.  She sleeps most of the time.  Denies pain affecting her activity but stated enjoyment of neck massage with Voltaren this shift.  Fluids encouraged but she drank less than 120 ml juice and sips of water.  Bladder scanned for 50 ml at 1800.

## 2023-10-19 NOTE — PROGRESS NOTES
10/19/23 1300   Appointment Info   Signing Clinician's Name / Credentials (PT) Deepthi Chen PT   Living Environment   People in Home alone   Current Living Arrangements house  (2 level home. Bedroom is up stairs)   Home Accessibility stairs to enter home;stairs within home   Number of Stairs, Main Entrance greater than 10 stairs   Stair Railings, Main Entrance railings on both sides of stairs   Number of Stairs, Within Home, Primary greater than 10 stairs   Stair Railings, Within Home, Primary railing on left side (ascending)   Transportation Anticipated family or friend will provide   Self-Care   Usual Activity Tolerance good   Current Activity Tolerance moderate   Regular Exercise No   Equipment Currently Used at Home walker, rolling  (FWW)   Fall history within last six months no   Activity/Exercise/Self-Care Comment Indep with mobility without AD, Pt does drive.   General Information   Onset of Illness/Injury or Date of Surgery 10/14/23   Referring Physician Gigi Umanzor   Patient/Family Therapy Goals Statement (PT) Pt wants to go home.   Pertinent History of Current Problem (include personal factors and/or comorbidities that impact the POC) Per the chart, 68 year old female with PMH significant for HTN, hypothyroidism and MDD who is admitted on 10/15/2023 due to Sepsis of unknown source, abdominal pain, nausea and vomiting- flea infestation.   Existing Precautions/Restrictions   (contact for fleas)   Weight-Bearing Status - LLE weight-bearing as tolerated   Weight-Bearing Status - RLE weight-bearing as tolerated   Cognition   Affect/Mental Status (Cognition)   (Pt was sleeping and PT did have to wake her up.  Pt was sleepy during treatment)   Orientation Status (Cognition) oriented x 4   Pain Assessment   Patient Currently in Pain No   Range of Motion (ROM)   Range of Motion ROM is WNL   ROM Comment LEs WFL   Strength (Manual Muscle Testing)   Strength Comments Pt is able to WB bilat LEs for  mobility.   Bed Mobility   Comment, (Bed Mobility) Supine<>sit with SBA,   Transfers   Comment, (Transfers) Sit<>stand with FWW with CGA x 1 .   Gait/Stairs (Locomotion)   [place] Level (Gait) minimum assist (75% patient effort);1 person assist   Assistive Device (Gait) walker, front-wheeled   Distance in Feet (Gait) 10   Pattern (Gait) swing-through   Deviations/Abnormal Patterns (Gait) gait speed decreased   Balance   Balance Comments Min A x 1 with FWW   Sensory Examination   Sensory Perception WNL   Sensory Perception Comments Bilat LE   Clinical Impression   Criteria for Skilled Therapeutic Intervention Yes, treatment indicated   PT Diagnosis (PT) Impaired functional mobility   Influenced by the following impairments dec bal, weakness, dec endurance, Pt is not at her PLOF.   Functional limitations due to impairments transfers, gait and steps   Clinical Presentation (PT Evaluation Complexity) stable   Clinical Presentation Rationale Pt presents medically diagnosed.   Clinical Decision Making (Complexity) low complexity   Planned Therapy Interventions (PT) bed mobility training;balance training;gait training;home exercise program;stair training;strengthening;transfer training   Risk & Benefits of therapy have been explained evaluation/treatment results reviewed;care plan/treatment goals reviewed;risks/benefits reviewed;spouse/significant other;participants voiced agreement with care plan   PT Total Evaluation Time   PT Eval, Low Complexity Minutes (52730) 13   Physical Therapy Goals   PT Frequency Daily   PT Predicted Duration/Target Date for Goal Attainment 10/26/23   PT Goals Bed Mobility;Transfers;Gait;Stairs;PT Goal 1   PT: Bed Mobility Independent;Supine to/from sit;Rolling   PT: Transfers Supervision/stand-by assist;Bed to/from chair;Assistive device;Sit to/from stand   PT: Gait Minimal assist;Rolling walker;150 feet   PT: Stairs 10 stairs;Minimal assist;Rail on both sides   PT: Goal 1 Pt to tyler bilat  LE ex x 10 to 20 reps to increase strength for mobility.  s   Interventions   Interventions Quick Adds Gait Training;Therapeutic Activity;Therapeutic Procedure   Therapeutic Procedure/Exercise   Ther. Procedure: strength, endurance, ROM, flexibillity Minutes (23305) 8   Treatment Detail/Skilled Intervention Seated bilat LE ex x 10 reps with cues for technique.   Therapeutic Activity   Treatment Detail/Skilled Intervention Supine<>sit with SBA with pt using the rail, Sit<>stand with CGA x 1 with the pt using the fWW with cues for hand placement.  .   Gait Training   Gait Training Minutes (04383) 8   Symptoms Noted During/After Treatment (Gait Training) fatigue   Treatment Detail/Skilled Intervention Pt walked 30' x 1 with fWW with min A x1. Pt was really tired and did not want to walk any more.   Distance in Feet 30'   [place] Level (Gait Training) minimum assist (75% patient effort)   Physical Assistance Level (Gait Training) verbal cues;1 person assist   Weight Bearing (Gait Training) weight-bearing as tolerated   Assistive Device (Gait Training) rolling walker  (FWW)   Pattern Analysis (Gait Training) swing-through gait   Gait Analysis Deviations decreased osman;decreased step length   Impairments (Gait Analysis/Training) strength decreased;balance impaired   PT Discharge Planning   PT Plan Gait with FWW, bed mobility, transfers and steps when able.   PT Discharge Recommendation (DC Rec) Transitional Care Facility  (Pending progress with mobility and steps and availability of help at home.)   PT Rationale for DC Rec The pt did not tyler much walking and was tired.  Pt does have steps at home. TCU is reocmmended pending progress with mobility and if she can get assist at home.  PT does recommend she use a FWW at this time.   PT Brief overview of current status PT eval, supine<>sit with SBA and transfers with CGA w FWW and ambulated 40' with FWW with min A x1 and LE ex.   PT Equipment Needed at Discharge  walker, rolling  (FWW)   Total Session Time   Timed Code Treatment Minutes 16   Total Session Time (sum of timed and untimed services) 29

## 2023-10-19 NOTE — PROGRESS NOTES
Care Management Follow Up    Length of Stay (days): 4    Expected Discharge Date: 10/19/2023     Concerns to be Addressed:     Medical progression and discharge planning  Patient plan of care discussed at interdisciplinary rounds: Yes    Anticipated Discharge Disposition:  Likely grandsons home.  Pending PT/OT recommendations     Anticipated Discharge Services:  TBD  Anticipated Discharge DME:  TBD    Patient/family educated on Medicare website which has current facility and service quality ratings:  not at this time.  Education Provided on the Discharge Plan:  yes  Patient/Family in Agreement with the Plan:  yes    Referrals Placed by CM/SW:  none at this time  Private pay costs discussed:  not at this time    Additional Information:  Chart reviewed.  On oral antibiotics.     Patient plans to [facility].  Grandson assists her with ADLs.    PT/OT consult ordered.  Awaiting therapy recommendations.     CM will continue to monitor medical progression and discharge planning.     Jessica Frias RN

## 2023-10-19 NOTE — PROGRESS NOTES
Daily Progress Note        CODE STATUS:  Full Code    10/18/23  Assessment/Plan:    Keyanna Palafox is a 68 year old female with PMH significant for HTN, hypothyroidism and MDD who is admitted on 10/15/2023 due to Sepsis of unknown source, abdominal pain, nausea and vomiting.     Sepsis of Unknown Source  -Admit patient. Lactic= 5.1-->2.5. WBC=14.6. AMS/lethargy.   -Urinalysis: Unremarkable  -CXR no PNA. S/p Cefepime and Vancomycin in ED.   -Empirically treated with IV vancomycin/cefepime on admission. Vanco discontinued. Cefepime switched to PO Cefdinir 10/18  -Clinically appears stable.  No tachycardia.  WBC is trending down  -BC: NGTD. UC : was not  sent     Abdominal Pain, improving  -Abdominal CAT scan reported with thickened gastric body with ulceration noted. Concern for gastritis vs carcinoma.   -Appreciate GI consult. S/p upper GI endoscopy on 10/17. Findings- esophagitis with no bleeding, non-bleeding gastric ulcer with no stigmata of bleeding.  Has been biopsied, pathology result pending  -Plan to repeat EGD in 3 to 4 months to reassess gastric ulcer  -Continue PPI.      Nausea & Vomiting  - CT abd/pel concerning for gastritis vs carcinoma.  PRN Zofran. PPI BID.   -Improving     Anemia  -Hb=8.7 on admission. It was 12.3 in June 2023. CT abd pel reveals thickened distal gastric body with ulceration anteriorly.   -Patient was found to have nonbleeding gastric ulcer.   -Monitor hemoglobin  -Iron study reveals iron deficiency. Will infuse IV iron 200mg x 1 today as well  -Borderline folate deficiency noted. Started PO folic acid     Leukocytosis  -Resolved  -Antibiotics as above     DELMI  -Likely prerenal.  Improved.  Avoid nephrotoxic agents.      Elevated troponin  - Improved. Initial troponin= 15-->12. Possible demand ischemia vs DELMI.      Flea infestation  - Contact isolation. Trial of Permethrin lotion.     Hypokalemia  - Replace per protocol     Chronic neck pain  -Most likely muscle pain  -Tylenol,  oral and IV opioid as needed  -Lidocaine patch  -Pain medicine consult appreciated     Acute urinary retention  -Remove villarreal today and follow bladder protocol     Generalized weakness  -PT/OT eval    Diet: Regular    DVT Prophylaxis: Pneumatic Compression Devices  Villarreal Catheter: PRESENT, indication: Retention  Lines: None     Cardiac Monitoring: None  Code Status: Full Code      Disposition; Home vs TCU tomorrow  Barrier to discharge; Clinical progress. PT/OT eval. Voiding trial      Subjective:  Interval History: Patient seen and examined this morning. Reports doing better. Offered no specific complaints besides feeling weak.    Review of Systems:   As mentioned in subjective.    Patient Active Problem List   Diagnosis    Viral Syndrome    Acute Bronchitis    Muscle Aches, Generalized (Myalgias)    Pain During Urination (Dysuria)    Closed Fracture Of The Distal End Of The Radius    Acute Sinusitis    Allergies    Abnormal Weight Loss    Hypothyroidism    Migraine Headache    Impingement Of The Right Shoulder    Hypertension    Internal Derangement Of Left Knee    Syncope    Moderate episode of recurrent major depressive disorder (H)    Periumbilical abdominal pain    Anemia, unspecified type       Scheduled Meds:   amitriptyline  50 mg Oral At Bedtime    atenolol  100 mg Oral Daily    cefdinir  300 mg Oral Q12H [facility] (08/20)    cloNIDine  0.2 mg Oral Daily    diclofenac  2 g Topical 4x Daily    escitalopram  20 mg Oral Daily    folic acid  1 mg Oral Daily    levothyroxine  112 mcg Oral Daily    lidocaine   Topical TID    lisinopril  20 mg Oral BID    pantoprazole  40 mg Oral BID AC    potassium chloride ER  10 mEq Oral Daily    sodium chloride (PF)  3 mL Intracatheter Q8H     Continuous Infusions:  PRN Meds:.acetaminophen **OR** acetaminophen, lidocaine 4%, lidocaine (buffered or not buffered), naloxone **OR** naloxone **OR** naloxone **OR** naloxone, oxyCODONE, oxyCODONE IR, sodium chloride (PF),  tiZANidine    Objective:  Vital signs in last 24 hours:  Temp:  [97.9  F (36.6  C)-98.2  F (36.8  C)] 98  F (36.7  C)  Pulse:  [77-84] 78  Resp:  [16-20] 20  BP: ()/(54-77) 102/62  SpO2:  [93 %-95 %] 93 %        Intake/Output Summary (Last 24 hours) at 10/18/2023 1602  Last data filed at 10/18/2023 1457  Gross per 24 hour   Intake 360 ml   Output 650 ml   Net -290 ml       Physical Exam:    General: Not in obvious distress.  HEENT: NC, AT. Pale conjunctiva   Chest: Clear to auscultation bilaterally  Heart: S1S2 normal, regular. No M/R/G  Abdomen: Soft. NT, ND. Bowel sounds- active.  Extremities: No legs swelling  Neuro: Alert and awake, grossly non-focal      Lab Results:(I have personally reviewed the results)    Recent Results (from the past 24 hour(s))   Potassium    Collection Time: 10/18/23  8:26 PM   Result Value Ref Range    Potassium 4.1 3.4 - 5.3 mmol/L   Creatinine    Collection Time: 10/19/23  5:50 AM   Result Value Ref Range    Creatinine 0.61 0.51 - 0.95 mg/dL    GFR Estimate >90 >60 mL/min/1.73m2   Hemoglobin    Collection Time: 10/19/23  5:50 AM   Result Value Ref Range    Hemoglobin 9.2 (L) 11.7 - 15.7 g/dL   Potassium    Collection Time: 10/19/23  5:50 AM   Result Value Ref Range    Potassium 4.2 3.4 - 5.3 mmol/L       All laboratory and imaging data in the past 24 hours reviewed  Serum Glucose range:   Recent Labs   Lab 10/18/23  0535 10/17/23  0532 10/16/23  0509 10/15/23  0713   GLC 91 91 86 109*     ABG: No lab results found in last 7 days.  CBC:   Recent Labs   Lab 10/19/23  0550 10/18/23  0535 10/17/23  0532 10/16/23  0509 10/15/23  0713 10/14/23  1950   WBC  --  6.7 12.9* 9.8 15.2* 14.6*   HGB 9.2* 8.6* 8.9* 7.2* 8.4* 8.7*   HCT  --  28.7* 29.2* 24.2* 27.8* 28.7*   MCV  --  86 86 86 86 86   PLT  --  424 439 425 532* 603*   NEUTROPHIL  --  74  --   --  85 85   LYMPH  --  18  --   --  7 9   MONOCYTE  --  6  --   --  5 4   EOSINOPHIL  --  0  --   --  0 0     Chemistry:   Recent Labs   Lab  "10/19/23  0550 10/18/23  2026 10/18/23  1153 10/18/23  0535 10/17/23  0532 10/16/23  1425 10/16/23  0509 10/15/23  0713 10/14/23  1950   NA  --   --   --  136 139  --  141 140 138   POTASSIUM 4.2 4.1 3.3* 3.3* 3.6   < > 3.3* 4.4 4.1   CHLORIDE  --   --   --  103 104  --  106 105 97*   CO2  --   --   --  25 25  --  24 20* 17*   BUN  --   --   --  13.1 8.8  --  7.3* 22.0 29.7*   CR 0.61  --   --  0.56 0.55  --  0.57 0.86 1.13*   GFRESTIMATED >90  --   --  >90 >90  --  >90 73 52*   KAREN  --   --   --  8.0* 8.4*  --  8.0* 8.0* 9.0   PROTTOTAL  --   --   --   --   --   --  5.6* 6.1* 6.6   ALBUMIN  --   --   --   --   --   --  2.8* 3.0* 3.2*   AST  --   --   --   --   --   --  14 10 11   ALT  --   --   --   --   --   --  6 6 7   ALKPHOS  --   --   --   --   --   --  74 83 95   BILITOTAL  --   --   --   --   --   --  0.2 0.2 0.2    < > = values in this interval not displayed.     Coags:  No results for input(s): \"INR\", \"PROTIME\", \"PTT\" in the last 168 hours.    Invalid input(s): \"APTT\"  Cardiac Markers:  No results for input(s): \"CKTOTAL\", \"TROPONINI\" in the last 168 hours.       CT Abdomen Pelvis w Contrast    Result Date: 10/14/2023  EXAM: CT ABDOMEN PELVIS W CONTRAST LOCATION: [facility] DATE: 10/14/2023 INDICATION: Abdominal pain, vomiting. COMPARISON: None. TECHNIQUE: CT scan of the abdomen and pelvis was performed following injection of IV contrast. Multiplanar reformats were obtained. Dose reduction techniques were used. CONTRAST: 75 mL Isovue 370. FINDINGS: LOWER CHEST: Cylindrical bronchiectasis in the lung bases. Mild bronchiectasis and consolidation within the lingula. HEPATOBILIARY: Normal. PANCREAS: Normal. SPLEEN: Normal. ADRENAL GLANDS: Normal. KIDNEYS/BLADDER: Tiny nonobstructive stone left mid kidney. BOWEL: Wall thickening distal gastric body with ulceration anteriorly. A few mildly prominent surrounding lymph nodes. Diagnostic considerations include carcinoma versus gastritis. " Normal appendix. LYMPH NODES: Normal. VASCULATURE: Unremarkable. PELVIC ORGANS: Normal. MUSCULOSKELETAL: Normal.     IMPRESSION: 1.  Wall thickening distal gastric body with ulceration anteriorly. There are a few mildly prominent surrounding lymph nodes. Diagnostic considerations include carcinoma versus gastritis. 2.  Tiny nonobstructing stone within the left kidney. No ureteric stone or hydronephrosis. 3.  Mild cylindrical bronchiectasis in the lung bases and lingula. Mild consolidation in the lingula.    XR Chest Port 1 View    Result Date: 10/14/2023  EXAM: XR CHEST PORT 1 VIEW LOCATION: [facility] DATE: 10/14/2023 INDICATION: cough elevated lactic acid evaluate for pneumonia COMPARISON: 10/03/2016     IMPRESSION: Heart size within normal limits for portable technique. Given patient's leftward rotation. Calcified aortic arch. Mildly hyperinflated lungs. No focal airspace consolidation. No visible pneumothorax or pleural effusion. Osteopenia.      Latest radiology report personally reviewed.    Note created using dragon voice recognition software so sounds alike errors may have escaped editing.      10/19/2023   Nico Vela MD  Hospitalist, [facility]  Pager: [phone]

## 2023-10-20 ENCOUNTER — APPOINTMENT (OUTPATIENT)
Dept: PHYSICAL THERAPY | Facility: HOSPITAL | Age: 68
DRG: 871 | End: 2023-10-20
Payer: COMMERCIAL

## 2023-10-20 ENCOUNTER — APPOINTMENT (OUTPATIENT)
Dept: OCCUPATIONAL THERAPY | Facility: HOSPITAL | Age: 68
DRG: 871 | End: 2023-10-20
Payer: COMMERCIAL

## 2023-10-20 LAB
ANION GAP SERPL CALCULATED.3IONS-SCNC: 10 MMOL/L (ref 7–15)
BACTERIA BLD CULT: NO GROWTH
BACTERIA BLD CULT: NO GROWTH
BUN SERPL-MCNC: 14.4 MG/DL (ref 8–23)
CALCIUM SERPL-MCNC: 8.5 MG/DL (ref 8.8–10.2)
CHLORIDE SERPL-SCNC: 104 MMOL/L (ref 98–107)
CREAT SERPL-MCNC: 0.63 MG/DL (ref 0.51–0.95)
DEPRECATED HCO3 PLAS-SCNC: 23 MMOL/L (ref 22–29)
EGFRCR SERPLBLD CKD-EPI 2021: >90 ML/MIN/1.73M2
GLUCOSE SERPL-MCNC: 91 MG/DL (ref 70–99)
HOLD SPECIMEN: NORMAL
POTASSIUM SERPL-SCNC: 4.4 MMOL/L (ref 3.4–5.3)
POTASSIUM SERPL-SCNC: 4.4 MMOL/L (ref 3.4–5.3)
SODIUM SERPL-SCNC: 137 MMOL/L (ref 135–145)

## 2023-10-20 PROCEDURE — 97535 SELF CARE MNGMENT TRAINING: CPT | Mod: GO

## 2023-10-20 PROCEDURE — 82310 ASSAY OF CALCIUM: CPT | Performed by: INTERNAL MEDICINE

## 2023-10-20 PROCEDURE — 120N000001 HC R&B MED SURG/OB

## 2023-10-20 PROCEDURE — 99232 SBSQ HOSP IP/OBS MODERATE 35: CPT | Performed by: INTERNAL MEDICINE

## 2023-10-20 PROCEDURE — 84132 ASSAY OF SERUM POTASSIUM: CPT | Performed by: STUDENT IN AN ORGANIZED HEALTH CARE EDUCATION/TRAINING PROGRAM

## 2023-10-20 PROCEDURE — 36415 COLL VENOUS BLD VENIPUNCTURE: CPT | Performed by: STUDENT IN AN ORGANIZED HEALTH CARE EDUCATION/TRAINING PROGRAM

## 2023-10-20 PROCEDURE — 258N000003 HC RX IP 258 OP 636: Performed by: INTERNAL MEDICINE

## 2023-10-20 PROCEDURE — 250N000013 HC RX MED GY IP 250 OP 250 PS 637: Performed by: INTERNAL MEDICINE

## 2023-10-20 PROCEDURE — 250N000013 HC RX MED GY IP 250 OP 250 PS 637: Performed by: PHYSICIAN ASSISTANT

## 2023-10-20 PROCEDURE — 250N000013 HC RX MED GY IP 250 OP 250 PS 637: Performed by: STUDENT IN AN ORGANIZED HEALTH CARE EDUCATION/TRAINING PROGRAM

## 2023-10-20 PROCEDURE — 250N000011 HC RX IP 250 OP 636: Mod: JZ | Performed by: INTERNAL MEDICINE

## 2023-10-20 PROCEDURE — 97110 THERAPEUTIC EXERCISES: CPT | Mod: GP

## 2023-10-20 RX ORDER — SODIUM CHLORIDE 9 MG/ML
INJECTION, SOLUTION INTRAVENOUS CONTINUOUS
Status: ACTIVE | OUTPATIENT
Start: 2023-10-20 | End: 2023-10-21

## 2023-10-20 RX ADMIN — FOLIC ACID 1 MG: 1 TABLET ORAL at 09:13

## 2023-10-20 RX ADMIN — ESCITALOPRAM OXALATE 20 MG: 20 TABLET ORAL at 09:13

## 2023-10-20 RX ADMIN — LISINOPRIL 20 MG: 20 TABLET ORAL at 09:13

## 2023-10-20 RX ADMIN — POTASSIUM CHLORIDE 10 MEQ: 750 TABLET, EXTENDED RELEASE ORAL at 09:15

## 2023-10-20 RX ADMIN — SODIUM CHLORIDE: 9 INJECTION, SOLUTION INTRAVENOUS at 16:31

## 2023-10-20 RX ADMIN — ATENOLOL 100 MG: 25 TABLET ORAL at 09:13

## 2023-10-20 RX ADMIN — CLONIDINE HYDROCHLORIDE 0.2 MG: 0.1 TABLET ORAL at 09:13

## 2023-10-20 RX ADMIN — CEFDINIR 300 MG: 300 CAPSULE ORAL at 09:13

## 2023-10-20 RX ADMIN — AMITRIPTYLINE HYDROCHLORIDE 50 MG: 25 TABLET, FILM COATED ORAL at 19:56

## 2023-10-20 RX ADMIN — PANTOPRAZOLE SODIUM 40 MG: 40 TABLET, DELAYED RELEASE ORAL at 16:19

## 2023-10-20 RX ADMIN — LEVOTHYROXINE SODIUM 112 MCG: 0.11 TABLET ORAL at 05:43

## 2023-10-20 RX ADMIN — CEFDINIR 300 MG: 300 CAPSULE ORAL at 19:54

## 2023-10-20 RX ADMIN — SODIUM CHLORIDE 500 ML: 9 INJECTION, SOLUTION INTRAVENOUS at 12:15

## 2023-10-20 RX ADMIN — IRON SUCROSE 200 MG: 20 INJECTION, SOLUTION INTRAVENOUS at 18:20

## 2023-10-20 RX ADMIN — PANTOPRAZOLE SODIUM 40 MG: 40 TABLET, DELAYED RELEASE ORAL at 09:13

## 2023-10-20 ASSESSMENT — ACTIVITIES OF DAILY LIVING (ADL)
ADLS_ACUITY_SCORE: 27

## 2023-10-20 NOTE — PLAN OF CARE
"  Problem: Adult Inpatient Plan of Care  Goal: Plan of Care Review  Description: The Plan of Care Review/Shift note should be completed every shift.  The Outcome Evaluation is a brief statement about your assessment that the patient is improving, declining, or no change.  This information will be displayed automatically on your shift  note.  Outcome: Progressing  Goal: Patient-Specific Goal (Individualized)  Description: You can add care plan individualizations to a care plan. Examples of Individualization might be:  \"Parent requests to be called daily at 9am for status\", \"I have a hard time hearing out of my right ear\", or \"Do not touch me to wake me up as it startles  me\".  Outcome: Progressing  Goal: Absence of Hospital-Acquired Illness or Injury  Outcome: Progressing  Intervention: Identify and Manage Fall Risk  Recent Flowsheet Documentation  Taken 10/20/2023 0900 by Erum Puente, LUCINA  Safety Promotion/Fall Prevention: activity supervised  Goal: Optimal Comfort and Wellbeing  Outcome: Progressing  Goal: Readiness for Transition of Care  Outcome: Progressing     Problem: Fluid Volume Deficit  Goal: Fluid Balance  Outcome: Progressing     Pt alert, oriented, flat and slow to answer questions at times. Denies pain and declined pain creams. Poor intake fluids and food. No output on prior shifts.  Notified Dr. Dane turner asking if fluids would be appropriate. Per MD No new orders at this time. RN encouraged fluids and helped order meals. Pt ate 100% breakfast, drank 120ml water.   Slight dizziness when sitting edge of bed. Sat for few minutes, had Pt take deep breaths. Dizziness subsided. Moved to chair with stand by assist and walker/gaitbelt. This afternoon While working with PT she got up to bathroom and became very dizzy. BP 67/44. Got Pt back to bed and laying flat. BP 77/48 and dizziness continued. MD notified. Pt had received AM BP meds as was above parameters on order. 500ml NS bolus ordered and started. Pt " unable to void. Bladder scanned 387ml. Strait cathed x1, Got 400 ml dark tea/moriah urine out.   Rechecking BP 96/54. Pt stated dizziness subsided. Continue to monitor. Helped Pt order lunch and encouraging fluids/food.  Last recorded BM 10/14. BS active. Requested stool softener order form MD. MD acknowledged, no order yet.     Orthostatic Bps   Lay 92/50  Sit 72/48  Stand 70/43  Reports dizziness with siting and standing. Dr. Vela updated. He will place order for continuous fluids.

## 2023-10-20 NOTE — PROGRESS NOTES
Daily Progress Note        CODE STATUS:  Full Code    10/18/23  Assessment/Plan:    Keyanna Palafox is a 68 year old female with PMH significant for HTN, hypothyroidism and MDD who is admitted on 10/15/2023 due to Sepsis of unknown source, abdominal pain, nausea and vomiting.     Sepsis of Unknown Source  -Admit patient. Lactic= 5.1-->2.5. WBC=14.6. AMS/lethargy.   -Urinalysis: Unremarkable  -CXR no PNA. S/p Cefepime and Vancomycin in ED.   -Empirically treated with IV vancomycin/cefepime on admission. Vanco discontinued. Cefepime switched to PO Cefdinir 10/18  -Clinically appears stable.  No tachycardia.  WBC trended down  -BC: NGTD. UC : was not  sent     Abdominal Pain, improving  -Abdominal CAT scan reported with thickened gastric body with ulceration noted. Concern for gastritis vs carcinoma.   -Appreciate GI consult. S/p upper GI endoscopy on 10/17. Findings- esophagitis with no bleeding, non-bleeding gastric ulcer with no stigmata of bleeding.  Has been biopsied, pathology result pending  -Plan to repeat EGD in 3 to 4 months to reassess gastric ulcer  -Continue PPI.      Nausea & Vomiting  -CT abd/pelvis concerning for gastritis vs carcinoma.  PRN Zofran. PPI BID.   -Improving     Anemia  -Hb=8.7 on admission. It was 12.3 in June 2023. CT abd pel reveals thickened distal gastric body with ulceration anteriorly.   -Patient was found to have nonbleeding gastric ulcer.   -Monitor hemoglobin  -Iron study reveals iron deficiency. Will complete 3rd dose of IV iron today  -Borderline folate deficiency noted. Started PO folic acid     Leukocytosis  -Resolved  -Antibiotics as above     DELMI  -Likely prerenal.  Improved.  Avoid nephrotoxic agents.      Elevated troponin  - Improved. Initial troponin= 15-->12. Possible demand ischemia vs DELMI.      Flea infestation  - Contact isolation. Trial of Permethrin lotion.     Hypokalemia  - Replace per protocol     Chronic neck pain  -Most likely muscle pain  -Tylenol, oral  and IV opioid as needed  -Lidocaine patch  -Pain medicine consult appreciated     Acute urinary retention  -Removed villarreal 10/19. Unable to void. Will follow bladder protocol. Straight cath and villarreal if needed    Hypotension, symptomatic  - Patient felt dizzy when up with therapy. BP was down to 67/44  - Positive orthostasis noted. Not eating/drinking much per nursing staff. Encouraged PO intake  - BP improved with 500ml NS bolus. Cont NS @100ml/hr. Do not suspect new sepsis as she is already on antibiotics. No bleeding. Hold all antihypertensives for now  - Labs tomorrow     Generalized weakness  -PT/OT eval    Diet: Regular    DVT Prophylaxis: Pneumatic Compression Devices  Villarreal Catheter: PRESENT, indication: Retention  Lines: None     Cardiac Monitoring: None  Code Status: Full Code      Disposition; Likely needs TCU. Patient agrees with TCU.   Barrier to discharge; Clinical progress. Hypotension.      Subjective:  Interval History: Patient seen and examined this morning. No new issues. We discussed about home vs TCU discharge. Patient stated she would let us know after she works with therapy. While working with therapy, she became dizzy and hypotensive as above.     Review of Systems:   As mentioned in subjective.    Patient Active Problem List   Diagnosis    Viral Syndrome    Acute Bronchitis    Muscle Aches, Generalized (Myalgias)    Pain During Urination (Dysuria)    Closed Fracture Of The Distal End Of The Radius    Acute Sinusitis    Allergies    Abnormal Weight Loss    Hypothyroidism    Migraine Headache    Impingement Of The Right Shoulder    Hypertension    Internal Derangement Of Left Knee    Syncope    Moderate episode of recurrent major depressive disorder (H)    Periumbilical abdominal pain    Anemia, unspecified type       Scheduled Meds:   amitriptyline  50 mg Oral At Bedtime    [Held by provider] atenolol  100 mg Oral Daily    cefdinir  300 mg Oral Q12H [facility] (08/20)    [Held by provider] cloNIDine   0.2 mg Oral Daily    diclofenac  2 g Topical 4x Daily    escitalopram  20 mg Oral Daily    folic acid  1 mg Oral Daily    levothyroxine  112 mcg Oral Daily    lidocaine   Topical TID    [Held by provider] lisinopril  20 mg Oral BID    pantoprazole  40 mg Oral BID AC    potassium chloride ER  10 mEq Oral Daily    sodium chloride (PF)  3 mL Intracatheter Q8H     Continuous Infusions:   sodium chloride       PRN Meds:.acetaminophen **OR** acetaminophen, lidocaine 4%, lidocaine (buffered or not buffered), naloxone **OR** naloxone **OR** naloxone **OR** naloxone, oxyCODONE, oxyCODONE IR, sodium chloride (PF), tiZANidine    Objective:  Vital signs in last 24 hours:  Temp:  [97  F (36.1  C)-97.7  F (36.5  C)] 97.7  F (36.5  C)  Pulse:  [67-89] 72  Resp:  [16-20] 18  BP: ()/(43-68) 88/53  SpO2:  [90 %-95 %] 94 %        Intake/Output Summary (Last 24 hours) at 10/18/2023 1602  Last data filed at 10/18/2023 1457  Gross per 24 hour   Intake 360 ml   Output 650 ml   Net -290 ml       Physical Exam:    General: Not in obvious distress.  HEENT: NC, AT. Pale conjunctiva   Chest: Clear to auscultation bilaterally  Heart: S1S2 normal, regular. No M/R/G  Abdomen: Soft. NT, ND. Bowel sounds- active.  Extremities: No legs swelling  Neuro: Alert and awake, grossly non-focal      Lab Results:(I have personally reviewed the results)    Recent Results (from the past 24 hour(s))   Extra Purple Top Tube    Collection Time: 10/20/23  6:10 AM   Result Value Ref Range    Hold Specimen JIC    Potassium    Collection Time: 10/20/23  6:27 AM   Result Value Ref Range    Potassium 4.4 3.4 - 5.3 mmol/L   Basic metabolic panel    Collection Time: 10/20/23  6:27 AM   Result Value Ref Range    Sodium 137 135 - 145 mmol/L    Potassium 4.4 3.4 - 5.3 mmol/L    Chloride 104 98 - 107 mmol/L    Carbon Dioxide (CO2) 23 22 - 29 mmol/L    Anion Gap 10 7 - 15 mmol/L    Urea Nitrogen 14.4 8.0 - 23.0 mg/dL    Creatinine 0.63 0.51 - 0.95 mg/dL    GFR Estimate  ">90 >60 mL/min/1.73m2    Calcium 8.5 (L) 8.8 - 10.2 mg/dL    Glucose 91 70 - 99 mg/dL       All laboratory and imaging data in the past 24 hours reviewed  Serum Glucose range:   Recent Labs   Lab 10/20/23  0627 10/18/23  0535 10/17/23  0532 10/16/23  0509   GLC 91 91 91 86     ABG: No lab results found in last 7 days.  CBC:   Recent Labs   Lab 10/19/23  0550 10/18/23  0535 10/17/23  0532 10/16/23  0509 10/15/23  0713 10/14/23  1950   WBC  --  6.7 12.9* 9.8 15.2* 14.6*   HGB 9.2* 8.6* 8.9* 7.2* 8.4* 8.7*   HCT  --  28.7* 29.2* 24.2* 27.8* 28.7*   MCV  --  86 86 86 86 86   PLT  --  424 439 425 532* 603*   NEUTROPHIL  --  74  --   --  85 85   LYMPH  --  18  --   --  7 9   MONOCYTE  --  6  --   --  5 4   EOSINOPHIL  --  0  --   --  0 0     Chemistry:   Recent Labs   Lab 10/20/23  0627 10/19/23  0550 10/18/23  2026 10/18/23  1153 10/18/23  0535 10/17/23  0532 10/16/23  1425 10/16/23  0509 10/15/23  0713 10/14/23  1950     --   --   --  136 139  --  141 140 138   POTASSIUM 4.4  4.4 4.2 4.1   < > 3.3* 3.6   < > 3.3* 4.4 4.1   CHLORIDE 104  --   --   --  103 104  --  106 105 97*   CO2 23  --   --   --  25 25  --  24 20* 17*   BUN 14.4  --   --   --  13.1 8.8  --  7.3* 22.0 29.7*   CR 0.63 0.61  --   --  0.56 0.55  --  0.57 0.86 1.13*   GFRESTIMATED >90 >90  --   --  >90 >90  --  >90 73 52*   KAREN 8.5*  --   --   --  8.0* 8.4*  --  8.0* 8.0* 9.0   PROTTOTAL  --   --   --   --   --   --   --  5.6* 6.1* 6.6   ALBUMIN  --   --   --   --   --   --   --  2.8* 3.0* 3.2*   AST  --   --   --   --   --   --   --  14 10 11   ALT  --   --   --   --   --   --   --  6 6 7   ALKPHOS  --   --   --   --   --   --   --  74 83 95   BILITOTAL  --   --   --   --   --   --   --  0.2 0.2 0.2    < > = values in this interval not displayed.     Coags:  No results for input(s): \"INR\", \"PROTIME\", \"PTT\" in the last 168 hours.    Invalid input(s): \"APTT\"  Cardiac Markers:  No results for input(s): \"CKTOTAL\", \"TROPONINI\" in the last 168 hours. "       CT Abdomen Pelvis w Contrast    Result Date: 10/14/2023  EXAM: CT ABDOMEN PELVIS W CONTRAST LOCATION: [facility] DATE: 10/14/2023 INDICATION: Abdominal pain, vomiting. COMPARISON: None. TECHNIQUE: CT scan of the abdomen and pelvis was performed following injection of IV contrast. Multiplanar reformats were obtained. Dose reduction techniques were used. CONTRAST: 75 mL Isovue 370. FINDINGS: LOWER CHEST: Cylindrical bronchiectasis in the lung bases. Mild bronchiectasis and consolidation within the lingula. HEPATOBILIARY: Normal. PANCREAS: Normal. SPLEEN: Normal. ADRENAL GLANDS: Normal. KIDNEYS/BLADDER: Tiny nonobstructive stone left mid kidney. BOWEL: Wall thickening distal gastric body with ulceration anteriorly. A few mildly prominent surrounding lymph nodes. Diagnostic considerations include carcinoma versus gastritis. Normal appendix. LYMPH NODES: Normal. VASCULATURE: Unremarkable. PELVIC ORGANS: Normal. MUSCULOSKELETAL: Normal.     IMPRESSION: 1.  Wall thickening distal gastric body with ulceration anteriorly. There are a few mildly prominent surrounding lymph nodes. Diagnostic considerations include carcinoma versus gastritis. 2.  Tiny nonobstructing stone within the left kidney. No ureteric stone or hydronephrosis. 3.  Mild cylindrical bronchiectasis in the lung bases and lingula. Mild consolidation in the lingula.    XR Chest Port 1 View    Result Date: 10/14/2023  EXAM: XR CHEST PORT 1 VIEW LOCATION: [facility] DATE: 10/14/2023 INDICATION: cough elevated lactic acid evaluate for pneumonia COMPARISON: 10/03/2016     IMPRESSION: Heart size within normal limits for portable technique. Given patient's leftward rotation. Calcified aortic arch. Mildly hyperinflated lungs. No focal airspace consolidation. No visible pneumothorax or pleural effusion. Osteopenia.      Latest radiology report personally reviewed.    Note created using dragon voice recognition  software so sounds alike errors may have escaped editing.      10/20/2023   Nico Vela MD  Hospitalist, [facility]  Pager: [phone]

## 2023-10-20 NOTE — PLAN OF CARE
Problem: Adult Inpatient Plan of Care  Goal: Optimal Comfort and Wellbeing  Outcome: Progressing     Problem: Pain Acute  Goal: Optimal Pain Control and Function  Outcome: Progressing  Intervention: Prevent or Manage Pain  Recent Flowsheet Documentation  Taken 10/20/2023 0000 by Shy Chin RN  Sensory Stimulation Regulation: television on  Medication Review/Management: medications reviewed     Goal Outcome Evaluation:  VSS.  No complaints of pain. Reported sleeping ok. Oral intake remains very low. No void this shift. Bladder scanned for 150 and 191.  No urge to void/

## 2023-10-20 NOTE — PLAN OF CARE
"  Problem: Adult Inpatient Plan of Care  Goal: Plan of Care Review  Description: The Plan of Care Review/Shift note should be completed every shift.  The Outcome Evaluation is a brief statement about your assessment that the patient is improving, declining, or no change.  This information will be displayed automatically on your shift  note.  10/20/2023 1842 by Erum Puente RN  Outcome: Progressing  10/20/2023 1236 by Erum Puente RN  Outcome: Progressing  Goal: Patient-Specific Goal (Individualized)  Description: You can add care plan individualizations to a care plan. Examples of Individualization might be:  \"Parent requests to be called daily at 9am for status\", \"I have a hard time hearing out of my right ear\", or \"Do not touch me to wake me up as it startles  me\".  10/20/2023 1842 by Erum Puente RN  Outcome: Progressing  10/20/2023 1236 by Erum Puente RN  Outcome: Progressing  Goal: Absence of Hospital-Acquired Illness or Injury  10/20/2023 1842 by Erum Puente RN  Outcome: Progressing  10/20/2023 1236 by Erum Puente RN  Outcome: Progressing  Intervention: Identify and Manage Fall Risk  Recent Flowsheet Documentation  Taken 10/20/2023 1515 by Erum Puente RN  Safety Promotion/Fall Prevention: activity supervised  Taken 10/20/2023 0900 by Erum Puente RN  Safety Promotion/Fall Prevention: activity supervised  Goal: Optimal Comfort and Wellbeing  10/20/2023 1842 by Erum Puente RN  Outcome: Progressing  10/20/2023 1236 by Erum Puente RN  Outcome: Progressing  Goal: Readiness for Transition of Care  10/20/2023 1842 by Erum Puente RN  Outcome: Progressing  10/20/2023 1236 by Erum Puente RN  Outcome: Progressing   Goal Outcome Evaluation:                      Pt alert and oriented, occasionally forgetful. Orthostatic hypotension and dizziness with sitting and standing. Fluids started NS at 100. Receiving IV iron now as well. Up to commode due to hypotension with stand by assist of one . Pt unable to void. " Bladder scan at 1700 167 ml. Will pass on to oncoming RN to have Pt attempt to void again by 2100 and bladder scan again. Fluids and food encouraged. Pt ate most of her 3 meals today. Tolerated well but needed encouragement to get meals.

## 2023-10-20 NOTE — PROGRESS NOTES
Care Management Follow Up    Length of Stay (days): 5    Expected Discharge Date: 10/21/2023     Concerns to be Addressed:     Medical progression and discharge planning  Patient plan of care discussed at interdisciplinary rounds: Yes    Anticipated Discharge Disposition:  TCU     Anticipated Discharge Services:  per TCU  Anticipated Discharge DME:  per TCU    Patient/family educated on Medicare website which has current facility and service quality ratings:  yes  Education Provided on the Discharge Plan:  yes  Patient/Family in Agreement with the Plan:  yes    Referrals Placed by CM/SW:  TCU  Private pay costs discussed:  not at this time    Additional Information:  PT recommends TCU.  CM met with patient in patient's room.  Discussed TCU options, presented [facility] TCU list.  Patient would prefer TCU close to home off [facility].    CM placed referrals to [facility], [facility], [facility], and [facility].    Referrals pending.    CM will continue to monitor medical progression and discharge planning.     Jessica Frias RN

## 2023-10-21 ENCOUNTER — APPOINTMENT (OUTPATIENT)
Dept: OCCUPATIONAL THERAPY | Facility: HOSPITAL | Age: 68
DRG: 871 | End: 2023-10-21
Payer: COMMERCIAL

## 2023-10-21 ENCOUNTER — APPOINTMENT (OUTPATIENT)
Dept: PHYSICAL THERAPY | Facility: HOSPITAL | Age: 68
DRG: 871 | End: 2023-10-21
Payer: COMMERCIAL

## 2023-10-21 LAB
ANION GAP SERPL CALCULATED.3IONS-SCNC: 7 MMOL/L (ref 7–15)
BUN SERPL-MCNC: 9.7 MG/DL (ref 8–23)
CALCIUM SERPL-MCNC: 8.3 MG/DL (ref 8.8–10.2)
CHLORIDE SERPL-SCNC: 108 MMOL/L (ref 98–107)
CREAT SERPL-MCNC: 0.54 MG/DL (ref 0.51–0.95)
DEPRECATED HCO3 PLAS-SCNC: 22 MMOL/L (ref 22–29)
EGFRCR SERPLBLD CKD-EPI 2021: >90 ML/MIN/1.73M2
ERYTHROCYTE [DISTWIDTH] IN BLOOD BY AUTOMATED COUNT: 17.5 % (ref 10–15)
GLUCOSE SERPL-MCNC: 85 MG/DL (ref 70–99)
HCT VFR BLD AUTO: 25 % (ref 35–47)
HGB BLD-MCNC: 7.5 G/DL (ref 11.7–15.7)
MCH RBC QN AUTO: 26 PG (ref 26.5–33)
MCHC RBC AUTO-ENTMCNC: 30 G/DL (ref 31.5–36.5)
MCV RBC AUTO: 87 FL (ref 78–100)
PLATELET # BLD AUTO: 259 10E3/UL (ref 150–450)
POTASSIUM SERPL-SCNC: 3.8 MMOL/L (ref 3.4–5.3)
RBC # BLD AUTO: 2.89 10E6/UL (ref 3.8–5.2)
SODIUM SERPL-SCNC: 137 MMOL/L (ref 135–145)
WBC # BLD AUTO: 6.5 10E3/UL (ref 4–11)

## 2023-10-21 PROCEDURE — 36415 COLL VENOUS BLD VENIPUNCTURE: CPT | Performed by: INTERNAL MEDICINE

## 2023-10-21 PROCEDURE — 99232 SBSQ HOSP IP/OBS MODERATE 35: CPT | Performed by: INTERNAL MEDICINE

## 2023-10-21 PROCEDURE — 80048 BASIC METABOLIC PNL TOTAL CA: CPT | Performed by: INTERNAL MEDICINE

## 2023-10-21 PROCEDURE — 250N000013 HC RX MED GY IP 250 OP 250 PS 637: Performed by: STUDENT IN AN ORGANIZED HEALTH CARE EDUCATION/TRAINING PROGRAM

## 2023-10-21 PROCEDURE — 250N000013 HC RX MED GY IP 250 OP 250 PS 637: Performed by: PHYSICIAN ASSISTANT

## 2023-10-21 PROCEDURE — 258N000003 HC RX IP 258 OP 636: Performed by: INTERNAL MEDICINE

## 2023-10-21 PROCEDURE — 250N000013 HC RX MED GY IP 250 OP 250 PS 637: Performed by: INTERNAL MEDICINE

## 2023-10-21 PROCEDURE — 97530 THERAPEUTIC ACTIVITIES: CPT | Mod: GP

## 2023-10-21 PROCEDURE — 97535 SELF CARE MNGMENT TRAINING: CPT | Mod: GO

## 2023-10-21 PROCEDURE — 85027 COMPLETE CBC AUTOMATED: CPT | Performed by: INTERNAL MEDICINE

## 2023-10-21 PROCEDURE — 120N000001 HC R&B MED SURG/OB

## 2023-10-21 RX ORDER — BISACODYL 10 MG
10 SUPPOSITORY, RECTAL RECTAL ONCE
Status: COMPLETED | OUTPATIENT
Start: 2023-10-21 | End: 2023-10-21

## 2023-10-21 RX ORDER — FERROUS SULFATE 325(65) MG
325 TABLET ORAL DAILY
Status: DISCONTINUED | OUTPATIENT
Start: 2023-10-22 | End: 2023-10-23

## 2023-10-21 RX ORDER — POLYETHYLENE GLYCOL 3350 17 G/17G
17 POWDER, FOR SOLUTION ORAL DAILY
Status: DISCONTINUED | OUTPATIENT
Start: 2023-10-21 | End: 2023-10-24 | Stop reason: HOSPADM

## 2023-10-21 RX ORDER — AMOXICILLIN 250 MG
1 CAPSULE ORAL 2 TIMES DAILY
Status: DISCONTINUED | OUTPATIENT
Start: 2023-10-21 | End: 2023-10-24 | Stop reason: HOSPADM

## 2023-10-21 RX ORDER — SODIUM CHLORIDE 9 MG/ML
INJECTION, SOLUTION INTRAVENOUS CONTINUOUS
Status: DISCONTINUED | OUTPATIENT
Start: 2023-10-21 | End: 2023-10-23

## 2023-10-21 RX ADMIN — ESCITALOPRAM OXALATE 20 MG: 20 TABLET ORAL at 08:11

## 2023-10-21 RX ADMIN — FOLIC ACID 1 MG: 1 TABLET ORAL at 08:10

## 2023-10-21 RX ADMIN — CEFDINIR 300 MG: 300 CAPSULE ORAL at 20:12

## 2023-10-21 RX ADMIN — POTASSIUM CHLORIDE 10 MEQ: 750 TABLET, EXTENDED RELEASE ORAL at 08:11

## 2023-10-21 RX ADMIN — PANTOPRAZOLE SODIUM 40 MG: 40 TABLET, DELAYED RELEASE ORAL at 17:06

## 2023-10-21 RX ADMIN — CEFDINIR 300 MG: 300 CAPSULE ORAL at 08:10

## 2023-10-21 RX ADMIN — SODIUM CHLORIDE: 9 INJECTION, SOLUTION INTRAVENOUS at 17:12

## 2023-10-21 RX ADMIN — AMITRIPTYLINE HYDROCHLORIDE 50 MG: 25 TABLET, FILM COATED ORAL at 20:12

## 2023-10-21 RX ADMIN — SENNOSIDES AND DOCUSATE SODIUM 1 TABLET: 8.6; 5 TABLET ORAL at 08:10

## 2023-10-21 RX ADMIN — PANTOPRAZOLE SODIUM 40 MG: 40 TABLET, DELAYED RELEASE ORAL at 06:40

## 2023-10-21 RX ADMIN — BISACODYL 10 MG: 10 SUPPOSITORY RECTAL at 08:10

## 2023-10-21 RX ADMIN — LEVOTHYROXINE SODIUM 112 MCG: 0.11 TABLET ORAL at 06:40

## 2023-10-21 RX ADMIN — SODIUM CHLORIDE 100 ML/HR: 9 INJECTION, SOLUTION INTRAVENOUS at 02:55

## 2023-10-21 RX ADMIN — POLYETHYLENE GLYCOL 3350 17 G: 17 POWDER, FOR SOLUTION ORAL at 08:10

## 2023-10-21 RX ADMIN — SENNOSIDES AND DOCUSATE SODIUM 1 TABLET: 8.6; 5 TABLET ORAL at 20:12

## 2023-10-21 ASSESSMENT — ACTIVITIES OF DAILY LIVING (ADL)
ADLS_ACUITY_SCORE: 27

## 2023-10-21 NOTE — PLAN OF CARE
Problem: Urinary Retention  Goal: Effective Urinary Elimination  Outcome: Not Progressing   Goal Outcome Evaluation:       Pt alert, oriented, flat. Occasionally forgetful. Needing encouragement to be active and get up to chair and to commode. Continue Encouraging food/fluids and helping Pt order meals. Bps improved today and systolic above 100. Slight dizziness sitting and standing but per pt it is better than yesterday and resolves quickly once she is sitting/laying again.   Got bowel meds and suppository this AM and had medium BM.  Attempted to void but unable to. Bladder scanned for 438. Straight cathed for 600 ml moriah urine at 1000. Spoke with MD, plan is to place villarreal catheter if pt needs to be straight cathed again. MD discussed this with Pt.   Got Pt up to chair for lunch. Pt refused to try to void first. Will re approach again after lunch and bladder scan.

## 2023-10-21 NOTE — PLAN OF CARE
"  Problem: Adult Inpatient Plan of Care  Goal: Plan of Care Review  Description: The Plan of Care Review/Shift note should be completed every shift.  The Outcome Evaluation is a brief statement about your assessment that the patient is improving, declining, or no change.  This information will be displayed automatically on your shift  note.  10/21/2023 1832 by Erum Puente RN  Outcome: Progressing  10/21/2023 1323 by Erum Puente RN  Outcome: Progressing  10/21/2023 1322 by Erum Puente RN  Outcome: Progressing  Goal: Patient-Specific Goal (Individualized)  Description: You can add care plan individualizations to a care plan. Examples of Individualization might be:  \"Parent requests to be called daily at 9am for status\", \"I have a hard time hearing out of my right ear\", or \"Do not touch me to wake me up as it startles  me\".  10/21/2023 1832 by Erum Puente RN  Outcome: Progressing  10/21/2023 1323 by Erum Puente RN  Outcome: Progressing  10/21/2023 1322 by Erum Puente RN  Outcome: Progressing  Goal: Absence of Hospital-Acquired Illness or Injury  10/21/2023 1832 by Erum Puente RN  Outcome: Progressing  10/21/2023 1323 by Erum Puente RN  Outcome: Progressing  10/21/2023 1322 by Erum Puente RN  Outcome: Progressing  Intervention: Identify and Manage Fall Risk  Recent Flowsheet Documentation  Taken 10/21/2023 1540 by Erum Puente RN  Safety Promotion/Fall Prevention: activity supervised  Taken 10/21/2023 0900 by Erum Puente RN  Safety Promotion/Fall Prevention: activity supervised  Goal: Optimal Comfort and Wellbeing  10/21/2023 1832 by Erum Puente RN  Outcome: Progressing  10/21/2023 1323 by Erum Puente RN  Outcome: Progressing  10/21/2023 1322 by Erum Puente RN  Outcome: Progressing  Goal: Readiness for Transition of Care  10/21/2023 1832 by Erum Puente RN  Outcome: Progressing  10/21/2023 1323 by Erum Puente RN  Outcome: Progressing  10/21/2023 1322 by Erum Puente RN  Outcome: Progressing   Goal Outcome " Evaluation:                    Pt working with therapy, standing, walking to bathroom. Slight dizziness. Standing BP 79/56. Got back to bed /59 dizziness improved. Dr. Vela notified. Had planned to stop fluids but given hypotension fluids were continued. Pt continued to retain urine and unable to void. Bladder scan 416. Villarreal catheter placed, draining. Updated Pt that she will most likely leave with the villarreal in place. Pt stated she understood. Continue to monitor.

## 2023-10-21 NOTE — PLAN OF CARE
Problem: Adult Inpatient Plan of Care  Goal: Plan of Care Review  Description: The Plan of Care Review/Shift note should be completed every shift.  The Outcome Evaluation is a brief statement about your assessment that the patient is improving, declining, or no change.  This information will be displayed automatically on your shift  note.  Outcome: Progressing     Problem: Gas Exchange Impaired  Goal: Optimal Gas Exchange  Outcome: Progressing  Intervention: Optimize Oxygenation and Ventilation  Recent Flowsheet Documentation  Taken 10/21/2023 0040 by Ernestine Avila RN  Head of Bed (HOB) Positioning: HOB at 20-30 degrees  Taken 10/20/2023 1958 by Ernestine Avila RN  Head of Bed (hospitals) Positioning: HOB at 20-30 degrees     Problem: Gas Exchange Impaired  Goal: Optimal Gas Exchange  Intervention: Optimize Oxygenation and Ventilation  Recent Flowsheet Documentation  Taken 10/21/2023 0040 by Ernestine Avila RN  Head of Bed (HOB) Positioning: HOB at 20-30 degrees  Taken 10/20/2023 1958 by Ernestine Avila RN  Head of Bed (hospitals) Positioning: HOB at 20-30 degrees     Problem: Fluid Volume Deficit  Goal: Fluid Balance  Outcome: Progressing     Problem: Malnutrition  Goal: Improved Nutritional Intake  Outcome: Progressing   Goal Outcome Evaluation:       Patient has denied pain all shift, new iv placed, old iv leaked. Patient up to commode with assist of 1 with gait belt & walker, patient dizziness with activity. NS infusing at 100 ml's/hr. 02 sat's 88-94% on 2 liters nasal cannula.patient able to void 400 mls of dark moriah urine.

## 2023-10-21 NOTE — PROGRESS NOTES
Daily Progress Note        CODE STATUS:  Full Code    10/18/23  Assessment/Plan:    Keyanna Palafox is a 68 year old female with PMH significant for HTN, hypothyroidism and MDD who is admitted on 10/15/2023 due to Sepsis of unknown source, abdominal pain, nausea and vomiting.     Sepsis of Unknown Source  -Admit patient. Lactic= 5.1-->2.5. WBC=14.6. AMS/lethargy.   -Urinalysis: Unremarkable  -CXR no PNA. S/p Cefepime and Vancomycin in ED.   -Empirically treated with IV vancomycin/cefepime on admission. Vanco discontinued. Cefepime switched to PO Cefdinir 10/18  -Clinically appears stable.  No tachycardia.  WBC trended down  -BC: NGTD. UC : was not  sent     Abdominal Pain, improving  -Abdominal CAT scan reported with thickened gastric body with ulceration noted. Concern for gastritis vs carcinoma.   -Appreciate GI consult. S/p upper GI endoscopy on 10/17. Findings- esophagitis with no bleeding, non-bleeding gastric ulcer with no stigmata of bleeding.  Has been biopsied, pathology report- no malignancy  -Plan to repeat EGD in 3 to 4 months to reassess gastric ulcer  -Continue PPI.      Nausea & Vomiting  -CT abd/pelvis concerning for gastritis vs carcinoma.  PRN Zofran. PPI BID.   -Improving     Anemia  -Hb=8.7 on admission. It was 12.3 in June 2023. CT abd pel reveals thickened distal gastric body with ulceration anteriorly.   -Patient was found to have nonbleeding gastric ulcer.   -Monitor hemoglobin  -Iron study reveals iron deficiency. Completed 3rd dose of IV iron. Start PO iron therapy  -Borderline folate deficiency noted. Started PO folic acid  -Hgb 7.5 today. No active bleeding. This is likely hemodilution     Leukocytosis  -Resolved  -Antibiotics as above     DELMI  -Likely prerenal.  Improved.  Avoid nephrotoxic agents.      Elevated troponin  - Improved. Initial troponin= 15-->12. Possible demand ischemia vs DELMI.      Flea infestation  - Contact isolation. Trial of Permethrin lotion.     Hypokalemia  -  Replace per protocol     Chronic neck pain  -Most likely muscle pain  -Tylenol, oral and IV opioid as needed  -Lidocaine patch  -Pain medicine consult appreciated     Acute urinary retention  -Removed villarreal 10/19. Needed straight cath yesterday and this morning. If retains again, will re-insert villarreal    Hypotension, symptomatic  - Patient felt dizzy when up with therapy on 10/20. BP was down to 67/44  - Positive orthostasis noted. Not eating/drinking much per nursing staff. Encouraged PO intake  - BP improved with 500ml NS bolus. Cont NS @100ml/hr. Do not suspect new sepsis as she is already on antibiotics. No bleeding. Hold all antihypertensives for now     Generalized weakness  -PT/OT eval    Diet: Regular    DVT Prophylaxis: Pneumatic Compression Devices  Villarreal Catheter: PRESENT, indication: Retention  Lines: None     Cardiac Monitoring: None  Code Status: Full Code      Disposition; Likely needs TCU. Patient agrees with TCU.   Barrier to discharge; Clinical progress. Hypotension.      Subjective:  Interval History: Patient seen and examined. Doing better today per patient. Only slightly dizzy with standing. No other acute issues overnight    Review of Systems:   As mentioned in subjective.    Patient Active Problem List   Diagnosis    Viral Syndrome    Acute Bronchitis    Muscle Aches, Generalized (Myalgias)    Pain During Urination (Dysuria)    Closed Fracture Of The Distal End Of The Radius    Acute Sinusitis    Allergies    Abnormal Weight Loss    Hypothyroidism    Migraine Headache    Impingement Of The Right Shoulder    Hypertension    Internal Derangement Of Left Knee    Syncope    Moderate episode of recurrent major depressive disorder (H)    Periumbilical abdominal pain    Anemia, unspecified type       Scheduled Meds:   amitriptyline  50 mg Oral At Bedtime    [Held by provider] atenolol  100 mg Oral Daily    cefdinir  300 mg Oral Q12H [facility] (08/20)    [Held by provider] cloNIDine  0.2 mg Oral Daily     diclofenac  2 g Topical 4x Daily    escitalopram  20 mg Oral Daily    folic acid  1 mg Oral Daily    levothyroxine  112 mcg Oral Daily    lidocaine   Topical TID    [Held by provider] lisinopril  20 mg Oral BID    pantoprazole  40 mg Oral BID AC    polyethylene glycol  17 g Oral Daily    potassium chloride ER  10 mEq Oral Daily    senna-docusate  1 tablet Oral BID    sodium chloride (PF)  3 mL Intracatheter Q8H     Continuous Infusions:   sodium chloride 100 mL/hr (10/21/23 0255)     PRN Meds:.acetaminophen **OR** acetaminophen, lidocaine 4%, lidocaine (buffered or not buffered), naloxone **OR** naloxone **OR** naloxone **OR** naloxone, oxyCODONE, oxyCODONE IR, sodium chloride (PF), tiZANidine    Objective:  Vital signs in last 24 hours:  Temp:  [97.7  F (36.5  C)-98.3  F (36.8  C)] 97.7  F (36.5  C)  Pulse:  [] 101  Resp:  [16-18] 16  BP: ()/(43-71) 108/59  SpO2:  [88 %-100 %] 90 %        Intake/Output Summary (Last 24 hours) at 10/18/2023 1602  Last data filed at 10/18/2023 1457  Gross per 24 hour   Intake 360 ml   Output 650 ml   Net -290 ml       Physical Exam:    General: Not in obvious distress.  HEENT: NC, AT. Pale conjunctiva   Chest: Clear to auscultation bilaterally  Heart: S1S2 normal, regular. No M/R/G  Abdomen: Soft. NT, ND. Bowel sounds- active.  Extremities: No legs swelling  Neuro: Alert and awake, grossly non-focal      Lab Results:(I have personally reviewed the results)    Recent Results (from the past 24 hour(s))   Basic metabolic panel    Collection Time: 10/21/23  6:42 AM   Result Value Ref Range    Sodium 137 135 - 145 mmol/L    Potassium 3.8 3.4 - 5.3 mmol/L    Chloride 108 (H) 98 - 107 mmol/L    Carbon Dioxide (CO2) 22 22 - 29 mmol/L    Anion Gap 7 7 - 15 mmol/L    Urea Nitrogen 9.7 8.0 - 23.0 mg/dL    Creatinine 0.54 0.51 - 0.95 mg/dL    GFR Estimate >90 >60 mL/min/1.73m2    Calcium 8.3 (L) 8.8 - 10.2 mg/dL    Glucose 85 70 - 99 mg/dL   CBC with platelets    Collection Time:  10/21/23  6:42 AM   Result Value Ref Range    WBC Count 6.5 4.0 - 11.0 10e3/uL    RBC Count 2.89 (L) 3.80 - 5.20 10e6/uL    Hemoglobin 7.5 (L) 11.7 - 15.7 g/dL    Hematocrit 25.0 (L) 35.0 - 47.0 %    MCV 87 78 - 100 fL    MCH 26.0 (L) 26.5 - 33.0 pg    MCHC 30.0 (L) 31.5 - 36.5 g/dL    RDW 17.5 (H) 10.0 - 15.0 %    Platelet Count 259 150 - 450 10e3/uL       All laboratory and imaging data in the past 24 hours reviewed  Serum Glucose range:   Recent Labs   Lab 10/21/23  0642 10/20/23  0627 10/18/23  0535 10/17/23  0532   GLC 85 91 91 91     ABG: No lab results found in last 7 days.  CBC:   Recent Labs   Lab 10/21/23  0642 10/19/23  0550 10/18/23  0535 10/17/23  0532 10/16/23  0509 10/15/23  0713 10/14/23  1950   WBC 6.5  --  6.7 12.9*   < > 15.2* 14.6*   HGB 7.5* 9.2* 8.6* 8.9*   < > 8.4* 8.7*   HCT 25.0*  --  28.7* 29.2*   < > 27.8* 28.7*   MCV 87  --  86 86   < > 86 86     --  424 439   < > 532* 603*   NEUTROPHIL  --   --  74  --   --  85 85   LYMPH  --   --  18  --   --  7 9   MONOCYTE  --   --  6  --   --  5 4   EOSINOPHIL  --   --  0  --   --  0 0    < > = values in this interval not displayed.     Chemistry:   Recent Labs   Lab 10/21/23  0642 10/20/23  0627 10/19/23  0550 10/18/23  1153 10/18/23  0535 10/16/23  1425 10/16/23  0509 10/15/23  0713 10/14/23  1950    137  --   --  136   < > 141 140 138   POTASSIUM 3.8 4.4  4.4 4.2   < > 3.3*   < > 3.3* 4.4 4.1   CHLORIDE 108* 104  --   --  103   < > 106 105 97*   CO2 22 23  --   --  25   < > 24 20* 17*   BUN 9.7 14.4  --   --  13.1   < > 7.3* 22.0 29.7*   CR 0.54 0.63 0.61  --  0.56   < > 0.57 0.86 1.13*   GFRESTIMATED >90 >90 >90  --  >90   < > >90 73 52*   KAREN 8.3* 8.5*  --   --  8.0*   < > 8.0* 8.0* 9.0   PROTTOTAL  --   --   --   --   --   --  5.6* 6.1* 6.6   ALBUMIN  --   --   --   --   --   --  2.8* 3.0* 3.2*   AST  --   --   --   --   --   --  14 10 11   ALT  --   --   --   --   --   --  6 6 7   ALKPHOS  --   --   --   --   --   --  74 83 95  "  BILITOTAL  --   --   --   --   --   --  0.2 0.2 0.2    < > = values in this interval not displayed.     Coags:  No results for input(s): \"INR\", \"PROTIME\", \"PTT\" in the last 168 hours.    Invalid input(s): \"APTT\"  Cardiac Markers:  No results for input(s): \"CKTOTAL\", \"TROPONINI\" in the last 168 hours.       CT Abdomen Pelvis w Contrast    Result Date: 10/14/2023  EXAM: CT ABDOMEN PELVIS W CONTRAST LOCATION: [facility] DATE: 10/14/2023 INDICATION: Abdominal pain, vomiting. COMPARISON: None. TECHNIQUE: CT scan of the abdomen and pelvis was performed following injection of IV contrast. Multiplanar reformats were obtained. Dose reduction techniques were used. CONTRAST: 75 mL Isovue 370. FINDINGS: LOWER CHEST: Cylindrical bronchiectasis in the lung bases. Mild bronchiectasis and consolidation within the lingula. HEPATOBILIARY: Normal. PANCREAS: Normal. SPLEEN: Normal. ADRENAL GLANDS: Normal. KIDNEYS/BLADDER: Tiny nonobstructive stone left mid kidney. BOWEL: Wall thickening distal gastric body with ulceration anteriorly. A few mildly prominent surrounding lymph nodes. Diagnostic considerations include carcinoma versus gastritis. Normal appendix. LYMPH NODES: Normal. VASCULATURE: Unremarkable. PELVIC ORGANS: Normal. MUSCULOSKELETAL: Normal.     IMPRESSION: 1.  Wall thickening distal gastric body with ulceration anteriorly. There are a few mildly prominent surrounding lymph nodes. Diagnostic considerations include carcinoma versus gastritis. 2.  Tiny nonobstructing stone within the left kidney. No ureteric stone or hydronephrosis. 3.  Mild cylindrical bronchiectasis in the lung bases and lingula. Mild consolidation in the lingula.    XR Chest Port 1 View    Result Date: 10/14/2023  EXAM: XR CHEST PORT 1 VIEW LOCATION: [facility] DATE: 10/14/2023 INDICATION: cough elevated lactic acid evaluate for pneumonia COMPARISON: 10/03/2016     IMPRESSION: Heart size within normal " limits for portable technique. Given patient's leftward rotation. Calcified aortic arch. Mildly hyperinflated lungs. No focal airspace consolidation. No visible pneumothorax or pleural effusion. Osteopenia.      Latest radiology report personally reviewed.    Note created using dragon voice recognition software so sounds alike errors may have escaped editing.      10/21/2023   Nico Vela MD  Hospitalist, [facility]  Pager: [phone]

## 2023-10-22 ENCOUNTER — APPOINTMENT (OUTPATIENT)
Dept: CARDIOLOGY | Facility: HOSPITAL | Age: 68
DRG: 871 | End: 2023-10-22
Attending: INTERNAL MEDICINE
Payer: COMMERCIAL

## 2023-10-22 ENCOUNTER — APPOINTMENT (OUTPATIENT)
Dept: OCCUPATIONAL THERAPY | Facility: HOSPITAL | Age: 68
DRG: 871 | End: 2023-10-22
Payer: COMMERCIAL

## 2023-10-22 DIAGNOSIS — E03.9 HYPOTHYROIDISM, UNSPECIFIED TYPE: ICD-10-CM

## 2023-10-22 DIAGNOSIS — I10 ESSENTIAL HYPERTENSION: ICD-10-CM

## 2023-10-22 LAB
ANION GAP SERPL CALCULATED.3IONS-SCNC: 8 MMOL/L (ref 7–15)
ATRIAL RATE - MUSE: 110 BPM
BUN SERPL-MCNC: 5 MG/DL (ref 8–23)
CALCIUM SERPL-MCNC: 8.2 MG/DL (ref 8.8–10.2)
CHLORIDE SERPL-SCNC: 109 MMOL/L (ref 98–107)
CORTIS SERPL-MCNC: 21.9 UG/DL
CREAT SERPL-MCNC: 0.51 MG/DL (ref 0.51–0.95)
DEPRECATED HCO3 PLAS-SCNC: 23 MMOL/L (ref 22–29)
DIASTOLIC BLOOD PRESSURE - MUSE: NORMAL MMHG
EGFRCR SERPLBLD CKD-EPI 2021: >90 ML/MIN/1.73M2
GLUCOSE SERPL-MCNC: 101 MG/DL (ref 70–99)
HGB BLD-MCNC: 7.8 G/DL (ref 11.7–15.7)
INTERPRETATION ECG - MUSE: NORMAL
LVEF ECHO: NORMAL
P AXIS - MUSE: 45 DEGREES
POTASSIUM SERPL-SCNC: 3.3 MMOL/L (ref 3.4–5.3)
POTASSIUM SERPL-SCNC: 4.2 MMOL/L (ref 3.4–5.3)
PR INTERVAL - MUSE: 134 MS
QRS DURATION - MUSE: 116 MS
QT - MUSE: 362 MS
QTC - MUSE: 489 MS
R AXIS - MUSE: -86 DEGREES
SODIUM SERPL-SCNC: 140 MMOL/L (ref 135–145)
SYSTOLIC BLOOD PRESSURE - MUSE: NORMAL MMHG
T AXIS - MUSE: -21 DEGREES
TSH SERPL DL<=0.005 MIU/L-ACNC: 2 UIU/ML (ref 0.3–4.2)
VENTRICULAR RATE- MUSE: 110 BPM

## 2023-10-22 PROCEDURE — 80048 BASIC METABOLIC PNL TOTAL CA: CPT | Performed by: INTERNAL MEDICINE

## 2023-10-22 PROCEDURE — 93010 ELECTROCARDIOGRAM REPORT: CPT | Performed by: INTERNAL MEDICINE

## 2023-10-22 PROCEDURE — 250N000013 HC RX MED GY IP 250 OP 250 PS 637: Performed by: PHYSICIAN ASSISTANT

## 2023-10-22 PROCEDURE — 93005 ELECTROCARDIOGRAM TRACING: CPT

## 2023-10-22 PROCEDURE — 250N000013 HC RX MED GY IP 250 OP 250 PS 637: Performed by: STUDENT IN AN ORGANIZED HEALTH CARE EDUCATION/TRAINING PROGRAM

## 2023-10-22 PROCEDURE — 85018 HEMOGLOBIN: CPT | Performed by: INTERNAL MEDICINE

## 2023-10-22 PROCEDURE — 120N000001 HC R&B MED SURG/OB

## 2023-10-22 PROCEDURE — 84443 ASSAY THYROID STIM HORMONE: CPT | Performed by: INTERNAL MEDICINE

## 2023-10-22 PROCEDURE — 99233 SBSQ HOSP IP/OBS HIGH 50: CPT | Performed by: INTERNAL MEDICINE

## 2023-10-22 PROCEDURE — 84132 ASSAY OF SERUM POTASSIUM: CPT | Performed by: INTERNAL MEDICINE

## 2023-10-22 PROCEDURE — 93306 TTE W/DOPPLER COMPLETE: CPT

## 2023-10-22 PROCEDURE — 97535 SELF CARE MNGMENT TRAINING: CPT | Mod: GO

## 2023-10-22 PROCEDURE — 93306 TTE W/DOPPLER COMPLETE: CPT | Mod: 26 | Performed by: GENERAL ACUTE CARE HOSPITAL

## 2023-10-22 PROCEDURE — 82533 TOTAL CORTISOL: CPT | Performed by: INTERNAL MEDICINE

## 2023-10-22 PROCEDURE — 258N000003 HC RX IP 258 OP 636: Performed by: INTERNAL MEDICINE

## 2023-10-22 PROCEDURE — 250N000013 HC RX MED GY IP 250 OP 250 PS 637: Performed by: INTERNAL MEDICINE

## 2023-10-22 PROCEDURE — 36415 COLL VENOUS BLD VENIPUNCTURE: CPT | Performed by: INTERNAL MEDICINE

## 2023-10-22 RX ORDER — POTASSIUM CHLORIDE 1500 MG/1
20 TABLET, EXTENDED RELEASE ORAL ONCE
Status: COMPLETED | OUTPATIENT
Start: 2023-10-22 | End: 2023-10-22

## 2023-10-22 RX ADMIN — POTASSIUM CHLORIDE 20 MEQ: 1500 TABLET, EXTENDED RELEASE ORAL at 10:36

## 2023-10-22 RX ADMIN — POTASSIUM CHLORIDE 10 MEQ: 750 TABLET, EXTENDED RELEASE ORAL at 08:50

## 2023-10-22 RX ADMIN — SENNOSIDES AND DOCUSATE SODIUM 1 TABLET: 8.6; 5 TABLET ORAL at 21:02

## 2023-10-22 RX ADMIN — SODIUM CHLORIDE: 9 INJECTION, SOLUTION INTRAVENOUS at 21:01

## 2023-10-22 RX ADMIN — FOLIC ACID 1 MG: 1 TABLET ORAL at 08:50

## 2023-10-22 RX ADMIN — CEFDINIR 300 MG: 300 CAPSULE ORAL at 21:01

## 2023-10-22 RX ADMIN — POLYETHYLENE GLYCOL 3350 17 G: 17 POWDER, FOR SOLUTION ORAL at 08:50

## 2023-10-22 RX ADMIN — AMITRIPTYLINE HYDROCHLORIDE 50 MG: 25 TABLET, FILM COATED ORAL at 21:02

## 2023-10-22 RX ADMIN — ACETAMINOPHEN 650 MG: 325 TABLET ORAL at 21:10

## 2023-10-22 RX ADMIN — FERROUS SULFATE TAB 325 MG (65 MG ELEMENTAL FE) 325 MG: 325 (65 FE) TAB at 08:50

## 2023-10-22 RX ADMIN — PANTOPRAZOLE SODIUM 40 MG: 40 TABLET, DELAYED RELEASE ORAL at 08:50

## 2023-10-22 RX ADMIN — SODIUM CHLORIDE: 9 INJECTION, SOLUTION INTRAVENOUS at 00:54

## 2023-10-22 RX ADMIN — CEFDINIR 300 MG: 300 CAPSULE ORAL at 08:50

## 2023-10-22 RX ADMIN — SENNOSIDES AND DOCUSATE SODIUM 1 TABLET: 8.6; 5 TABLET ORAL at 08:51

## 2023-10-22 RX ADMIN — SODIUM CHLORIDE 100 ML/HR: 9 INJECTION, SOLUTION INTRAVENOUS at 10:41

## 2023-10-22 RX ADMIN — ESCITALOPRAM OXALATE 20 MG: 20 TABLET ORAL at 08:50

## 2023-10-22 RX ADMIN — DICLOFENAC 2 G: 10 GEL TOPICAL at 21:11

## 2023-10-22 RX ADMIN — PANTOPRAZOLE SODIUM 40 MG: 40 TABLET, DELAYED RELEASE ORAL at 17:02

## 2023-10-22 RX ADMIN — LEVOTHYROXINE SODIUM 112 MCG: 0.11 TABLET ORAL at 06:25

## 2023-10-22 ASSESSMENT — ACTIVITIES OF DAILY LIVING (ADL)
ADLS_ACUITY_SCORE: 27

## 2023-10-22 NOTE — PLAN OF CARE
Problem: Adult Inpatient Plan of Care  Goal: Absence of Hospital-Acquired Illness or Injury  Intervention: Identify and Manage Fall Risk  Recent Flowsheet Documentation  Taken 10/22/2023 0151 by Asim Huizar, LUCINA  Safety Promotion/Fall Prevention: activity supervised  Taken 10/21/2023 2018 by Asim Huizar, LUCINA  Safety Promotion/Fall Prevention: activity supervised  Intervention: Prevent Skin Injury  Recent Flowsheet Documentation  Taken 10/21/2023 2018 by Asim Huizar, RN  Body Position: supine, legs elevated     Problem: Pain Acute  Goal: Optimal Pain Control and Function  Intervention: Prevent or Manage Pain  Recent Flowsheet Documentation  Taken 10/21/2023 2018 by Asim Huizar, RN  Medication Review/Management: medications reviewed   Goal Outcome Evaluation:       Patient is alert and able to communicate needs, denies pain. VSS, slept well between care.                  negative No oral lesions; no gross abnormalities

## 2023-10-22 NOTE — PLAN OF CARE
LS clear, diminished. Spot checking sats. 88% RA. Working with IS , cough and deep breath. Had been up to chair this afternoon. Placed back on 1L NC to maintain sats at 91%. Continue to monitor.

## 2023-10-22 NOTE — PROGRESS NOTES
Daily Progress Note        CODE STATUS:  Full Code    10/18/23  Assessment/Plan:    Keyanna Palafox is a 68 year old female with PMH significant for HTN, hypothyroidism and MDD who is admitted on 10/15/2023 due to Sepsis of unknown source, abdominal pain, nausea and vomiting.     Sepsis of Unknown Source  -Admit patient. Lactic= 5.1-->2.5. WBC=14.6. AMS/lethargy.   -Urinalysis: Unremarkable  -CXR no PNA. S/p Cefepime and Vancomycin in ED.   -Empirically treated with IV vancomycin/cefepime on admission. Vanco discontinued. Cefepime switched to PO Cefdinir 10/18  -Clinically appears stable.  WBC trended down  -BC: NGTD. UC : was not  sent     Abdominal Pain, improving  -Abdominal CAT scan reported with thickened gastric body with ulceration noted. Concern for gastritis vs carcinoma.   -Appreciate GI consult. S/p upper GI endoscopy on 10/17. Findings- esophagitis with no bleeding, non-bleeding gastric ulcer with no stigmata of bleeding.  Has been biopsied, pathology report- no malignancy  -Plan to repeat EGD in 3 to 4 months to reassess gastric ulcer  -Continue PPI.      Nausea & Vomiting  -CT abd/pelvis concerning for gastritis vs carcinoma.  PRN Zofran. PPI BID.   -Improving     Anemia  -Hb=8.7 on admission. It was 12.3 in June 2023. CT abd pel reveals thickened distal gastric body with ulceration anteriorly.   -Patient was found to have nonbleeding gastric ulcer.   -Monitor hemoglobin. No bleeding here. Stools are not black.  -Iron study reveals iron deficiency. Completed 3rd dose of IV iron. Start PO iron therapy  -Borderline folate deficiency noted. Started PO folic acid  -Hgb 7.8 today. No active bleeding. This is likely hemodilution     Leukocytosis  -Resolved  -Antibiotics as above     DELMI  -Likely prerenal.  Improved.  Avoid nephrotoxic agents.      Elevated troponin  - Improved. Initial troponin= 15-->12. Possible demand ischemia vs DELMI.      Flea infestation  - Contact isolation. Trial of Permethrin  lotion.     Hypokalemia  - Replace per protocol     Chronic neck pain  -Most likely muscle pain  -Tylenol, oral and IV opioid as needed  -Lidocaine patch  -Pain medicine consult appreciated     Acute urinary retention  -Removed villarreal 10/19. Needed straight cath few times after villarreal removal. Villarreal re-inserted 10/21. Will keep the villarreal in for next few days until she is more ambulatory.    Hypotension, symptomatic  - Patient felt dizzy when up with therapy on 10/20. BP was down to 67/44  - Positive orthostasis noted. Not eating/drinking much per nursing staff. Encouraged PO intake  - BP improved with 500ml NS bolus. Cont NS @100ml/hr. Do not suspect new sepsis as she is already on antibiotics. No bleeding. Holding all antihypertensives for now. Still mildly orthostatic, tachycardic and dizzy while standing. Cont IVF. Will check EKG, echo, TSH and cortisol     Generalized weakness  -PT/OT eval    Diet: Regular    DVT Prophylaxis: Pneumatic Compression Devices  Villarreal Catheter: PRESENT, indication: Retention  Lines: None     Cardiac Monitoring: None  Code Status: Full Code      Disposition; Likely needs TCU. Patient agrees with TCU.   Barrier to discharge; Clinical progress. Hypotension.      Subjective:  Interval History: Doing better, however, still feels dizzy while standing.     Review of Systems:   As mentioned in subjective.    Patient Active Problem List   Diagnosis    Viral Syndrome    Acute Bronchitis    Muscle Aches, Generalized (Myalgias)    Pain During Urination (Dysuria)    Closed Fracture Of The Distal End Of The Radius    Acute Sinusitis    Allergies    Abnormal Weight Loss    Hypothyroidism    Migraine Headache    Impingement Of The Right Shoulder    Hypertension    Internal Derangement Of Left Knee    Syncope    Moderate episode of recurrent major depressive disorder (H)    Periumbilical abdominal pain    Anemia, unspecified type       Scheduled Meds:   amitriptyline  50 mg Oral At Bedtime    [Held by  provider] atenolol  100 mg Oral Daily    cefdinir  300 mg Oral Q12H [facility] (08/20)    [Held by provider] cloNIDine  0.2 mg Oral Daily    diclofenac  2 g Topical 4x Daily    escitalopram  20 mg Oral Daily    ferrous sulfate  325 mg Oral Daily    folic acid  1 mg Oral Daily    levothyroxine  112 mcg Oral Daily    lidocaine   Topical TID    [Held by provider] lisinopril  20 mg Oral BID    pantoprazole  40 mg Oral BID AC    polyethylene glycol  17 g Oral Daily    potassium chloride ER  10 mEq Oral Daily    senna-docusate  1 tablet Oral BID    sodium chloride (PF)  3 mL Intracatheter Q8H     Continuous Infusions:   sodium chloride 100 mL/hr (10/22/23 1041)     PRN Meds:.acetaminophen **OR** acetaminophen, lidocaine 4%, lidocaine (buffered or not buffered), naloxone **OR** naloxone **OR** naloxone **OR** naloxone, oxyCODONE, oxyCODONE IR, sodium chloride (PF), tiZANidine    Objective:  Vital signs in last 24 hours:  Temp:  [97.8  F (36.6  C)-98.2  F (36.8  C)] 98.1  F (36.7  C)  Pulse:  [] 104  Resp:  [16-18] 16  BP: ()/(56-70) 112/64  SpO2:  [90 %-97 %] 90 %        Intake/Output Summary (Last 24 hours) at 10/18/2023 1602  Last data filed at 10/18/2023 1457  Gross per 24 hour   Intake 360 ml   Output 650 ml   Net -290 ml       Physical Exam:    General: Not in obvious distress.  HEENT: NC, AT. Pale conjunctiva   Chest: Clear to auscultation bilaterally  Heart: S1S2 normal, regular. No M/R/G  Abdomen: Soft. NT, ND. Bowel sounds- active.  Extremities: No legs swelling  Neuro: Alert and awake, grossly non-focal      Lab Results:(I have personally reviewed the results)    Recent Results (from the past 24 hour(s))   Basic metabolic panel    Collection Time: 10/22/23  7:33 AM   Result Value Ref Range    Sodium 140 135 - 145 mmol/L    Potassium 3.3 (L) 3.4 - 5.3 mmol/L    Chloride 109 (H) 98 - 107 mmol/L    Carbon Dioxide (CO2) 23 22 - 29 mmol/L    Anion Gap 8 7 - 15 mmol/L    Urea Nitrogen 5.0 (L) 8.0 - 23.0 mg/dL     Creatinine 0.51 0.51 - 0.95 mg/dL    GFR Estimate >90 >60 mL/min/1.73m2    Calcium 8.2 (L) 8.8 - 10.2 mg/dL    Glucose 101 (H) 70 - 99 mg/dL   Hemoglobin    Collection Time: 10/22/23  9:47 AM   Result Value Ref Range    Hemoglobin 7.8 (L) 11.7 - 15.7 g/dL   ECG 12-LEAD WITH MUSE ([facility])    Collection Time: 10/22/23  1:13 PM   Result Value Ref Range    Systolic Blood Pressure  mmHg    Diastolic Blood Pressure  mmHg    Ventricular Rate 110 BPM    Atrial Rate 110 BPM    OH Interval 134 ms    QRS Duration 116 ms     ms    QTc 489 ms    P Axis 45 degrees    R AXIS -86 degrees    T Axis -21 degrees    Interpretation ECG       Sinus tachycardia  Left axis deviation  Pulmonary disease pattern  Right bundle branch block  Abnormal ECG  When compared with ECG of 14-OCT-2023 20:47,  Nonspecific T wave abnormality, worse in Inferior leads  Nonspecific T wave abnormality now evident in Lateral leads         All laboratory and imaging data in the past 24 hours reviewed  Serum Glucose range:   Recent Labs   Lab 10/22/23  0733 10/21/23  0642 10/20/23  0627 10/18/23  0535   * 85 91 91     ABG: No lab results found in last 7 days.  CBC:   Recent Labs   Lab 10/22/23  0947 10/21/23  0642 10/19/23  0550 10/18/23  0535 10/17/23  0532   WBC  --  6.5  --  6.7 12.9*   HGB 7.8* 7.5* 9.2* 8.6* 8.9*   HCT  --  25.0*  --  28.7* 29.2*   MCV  --  87  --  86 86   PLT  --  259  --  424 439   NEUTROPHIL  --   --   --  74  --    LYMPH  --   --   --  18  --    MONOCYTE  --   --   --  6  --    EOSINOPHIL  --   --   --  0  --      Chemistry:   Recent Labs   Lab 10/22/23  0733 10/21/23  0642 10/20/23  0627 10/16/23  1425 10/16/23  0509    137 137   < > 141   POTASSIUM 3.3* 3.8 4.4  4.4   < > 3.3*   CHLORIDE 109* 108* 104   < > 106   CO2 23 22 23   < > 24   BUN 5.0* 9.7 14.4   < > 7.3*   CR 0.51 0.54 0.63   < > 0.57   GFRESTIMATED >90 >90 >90   < > >90   KAREN 8.2* 8.3* 8.5*   < > 8.0*   PROTTOTAL  --   --   --   --  5.6*   ALBUMIN  --   --  "  --   --  2.8*   AST  --   --   --   --  14   ALT  --   --   --   --  6   ALKPHOS  --   --   --   --  74   BILITOTAL  --   --   --   --  0.2    < > = values in this interval not displayed.     Coags:  No results for input(s): \"INR\", \"PROTIME\", \"PTT\" in the last 168 hours.    Invalid input(s): \"APTT\"  Cardiac Markers:  No results for input(s): \"CKTOTAL\", \"TROPONINI\" in the last 168 hours.       CT Abdomen Pelvis w Contrast    Result Date: 10/14/2023  EXAM: CT ABDOMEN PELVIS W CONTRAST LOCATION: [facility] DATE: 10/14/2023 INDICATION: Abdominal pain, vomiting. COMPARISON: None. TECHNIQUE: CT scan of the abdomen and pelvis was performed following injection of IV contrast. Multiplanar reformats were obtained. Dose reduction techniques were used. CONTRAST: 75 mL Isovue 370. FINDINGS: LOWER CHEST: Cylindrical bronchiectasis in the lung bases. Mild bronchiectasis and consolidation within the lingula. HEPATOBILIARY: Normal. PANCREAS: Normal. SPLEEN: Normal. ADRENAL GLANDS: Normal. KIDNEYS/BLADDER: Tiny nonobstructive stone left mid kidney. BOWEL: Wall thickening distal gastric body with ulceration anteriorly. A few mildly prominent surrounding lymph nodes. Diagnostic considerations include carcinoma versus gastritis. Normal appendix. LYMPH NODES: Normal. VASCULATURE: Unremarkable. PELVIC ORGANS: Normal. MUSCULOSKELETAL: Normal.     IMPRESSION: 1.  Wall thickening distal gastric body with ulceration anteriorly. There are a few mildly prominent surrounding lymph nodes. Diagnostic considerations include carcinoma versus gastritis. 2.  Tiny nonobstructing stone within the left kidney. No ureteric stone or hydronephrosis. 3.  Mild cylindrical bronchiectasis in the lung bases and lingula. Mild consolidation in the lingula.    XR Chest Port 1 View    Result Date: 10/14/2023  EXAM: XR CHEST PORT 1 VIEW LOCATION: [facility] DATE: 10/14/2023 INDICATION: cough elevated lactic acid " evaluate for pneumonia COMPARISON: 10/03/2016     IMPRESSION: Heart size within normal limits for portable technique. Given patient's leftward rotation. Calcified aortic arch. Mildly hyperinflated lungs. No focal airspace consolidation. No visible pneumothorax or pleural effusion. Osteopenia.      Latest radiology report personally reviewed.    Note created using dragon voice recognition software so sounds alike errors may have escaped editing.      10/22/2023   Nico Vela MD  Hospitalist, [facility]  Pager: [phone]

## 2023-10-22 NOTE — PLAN OF CARE
"  Problem: Adult Inpatient Plan of Care  Goal: Plan of Care Review  Description: The Plan of Care Review/Shift note should be completed every shift.  The Outcome Evaluation is a brief statement about your assessment that the patient is improving, declining, or no change.  This information will be displayed automatically on your shift  note.  Outcome: Progressing  Goal: Patient-Specific Goal (Individualized)  Description: You can add care plan individualizations to a care plan. Examples of Individualization might be:  \"Parent requests to be called daily at 9am for status\", \"I have a hard time hearing out of my right ear\", or \"Do not touch me to wake me up as it startles  me\".  Outcome: Progressing  Goal: Absence of Hospital-Acquired Illness or Injury  Outcome: Progressing  Intervention: Identify and Manage Fall Risk  Recent Flowsheet Documentation  Taken 10/22/2023 1000 by Erum Puente RN  Safety Promotion/Fall Prevention: activity supervised  Goal: Optimal Comfort and Wellbeing  Outcome: Progressing  Goal: Readiness for Transition of Care  Outcome: Progressing     Problem: Gas Exchange Impaired  Goal: Optimal Gas Exchange  Outcome: Progressing     Problem: Fluid Volume Deficit  Goal: Fluid Balance  Outcome: Progressing     Problem: Oral Intake Inadequate  Goal: Improved Oral Intake  Outcome: Progressing     Problem: Urinary Retention  Goal: Effective Urinary Elimination  Outcome: Progressing     Problem: Urinary Retention  Goal: Effective Urinary Elimination  Outcome: Progressing     Pt alert and oriented. Denies pain. Pina in place and draining. Food and fluids encouraged. K 3.3, replaced per protocol and will recheck this afternoon. Hgb 7.8.   Got Pt up to chair for lunch. Dizziness and HR tachy while sitting and standing but BP maintained systolic above 100. LS diminished. Sats at rest bounce between 88-91. On 1 L NC this morning sating 94%. Up to chair, cough/deep breathing, IS use. Denies SOB. IV fluids " running per order. Dr. Vela aware. Continue to monitor.

## 2023-10-23 LAB
ANION GAP SERPL CALCULATED.3IONS-SCNC: 6 MMOL/L (ref 7–15)
BUN SERPL-MCNC: 3.5 MG/DL (ref 8–23)
CALCIUM SERPL-MCNC: 8.5 MG/DL (ref 8.8–10.2)
CHLORIDE SERPL-SCNC: 110 MMOL/L (ref 98–107)
CREAT SERPL-MCNC: 0.47 MG/DL (ref 0.51–0.95)
DEPRECATED HCO3 PLAS-SCNC: 24 MMOL/L (ref 22–29)
EGFRCR SERPLBLD CKD-EPI 2021: >90 ML/MIN/1.73M2
GLUCOSE BLDC GLUCOMTR-MCNC: 98 MG/DL (ref 70–99)
GLUCOSE SERPL-MCNC: 91 MG/DL (ref 70–99)
POTASSIUM SERPL-SCNC: 3.5 MMOL/L (ref 3.4–5.3)
SODIUM SERPL-SCNC: 140 MMOL/L (ref 135–145)

## 2023-10-23 PROCEDURE — 36415 COLL VENOUS BLD VENIPUNCTURE: CPT | Performed by: INTERNAL MEDICINE

## 2023-10-23 PROCEDURE — 250N000013 HC RX MED GY IP 250 OP 250 PS 637: Performed by: INTERNAL MEDICINE

## 2023-10-23 PROCEDURE — 250N000013 HC RX MED GY IP 250 OP 250 PS 637: Performed by: STUDENT IN AN ORGANIZED HEALTH CARE EDUCATION/TRAINING PROGRAM

## 2023-10-23 PROCEDURE — 99232 SBSQ HOSP IP/OBS MODERATE 35: CPT | Performed by: FAMILY MEDICINE

## 2023-10-23 PROCEDURE — 80048 BASIC METABOLIC PNL TOTAL CA: CPT | Performed by: INTERNAL MEDICINE

## 2023-10-23 PROCEDURE — 120N000001 HC R&B MED SURG/OB

## 2023-10-23 PROCEDURE — 250N000013 HC RX MED GY IP 250 OP 250 PS 637: Performed by: PHYSICIAN ASSISTANT

## 2023-10-23 PROCEDURE — 258N000003 HC RX IP 258 OP 636: Performed by: INTERNAL MEDICINE

## 2023-10-23 RX ORDER — AMLODIPINE BESYLATE 10 MG/1
TABLET ORAL
Qty: 90 TABLET | Refills: 1 | Status: SHIPPED | OUTPATIENT
Start: 2023-10-23 | End: 2023-10-24

## 2023-10-23 RX ORDER — CLONIDINE HYDROCHLORIDE 0.2 MG/1
TABLET ORAL
Qty: 90 TABLET | Refills: 1 | Status: SHIPPED | OUTPATIENT
Start: 2023-10-23 | End: 2023-10-24

## 2023-10-23 RX ORDER — FERROUS SULFATE 325(65) MG
325 TABLET ORAL DAILY
Status: DISCONTINUED | OUTPATIENT
Start: 2023-10-24 | End: 2023-10-24 | Stop reason: HOSPADM

## 2023-10-23 RX ORDER — ESCITALOPRAM OXALATE 20 MG/1
20 TABLET ORAL DAILY
Status: DISCONTINUED | OUTPATIENT
Start: 2023-10-24 | End: 2023-10-23

## 2023-10-23 RX ORDER — ESCITALOPRAM OXALATE 20 MG/1
20 TABLET ORAL DAILY
Status: DISCONTINUED | OUTPATIENT
Start: 2023-10-24 | End: 2023-10-24 | Stop reason: HOSPADM

## 2023-10-23 RX ORDER — ATENOLOL 100 MG/1
TABLET ORAL
Qty: 90 TABLET | Refills: 1 | Status: SHIPPED | OUTPATIENT
Start: 2023-10-23 | End: 2023-10-24

## 2023-10-23 RX ORDER — LEVOTHYROXINE SODIUM 112 UG/1
TABLET ORAL
Qty: 90 TABLET | Refills: 1 | Status: SHIPPED | OUTPATIENT
Start: 2023-10-23 | End: 2023-11-14

## 2023-10-23 RX ORDER — POTASSIUM CHLORIDE 750 MG/1
TABLET, EXTENDED RELEASE ORAL
Qty: 90 TABLET | Refills: 1 | Status: SHIPPED | OUTPATIENT
Start: 2023-10-23 | End: 2023-11-14

## 2023-10-23 RX ADMIN — POLYETHYLENE GLYCOL 3350 17 G: 17 POWDER, FOR SOLUTION ORAL at 08:34

## 2023-10-23 RX ADMIN — LEVOTHYROXINE SODIUM 112 MCG: 0.11 TABLET ORAL at 06:44

## 2023-10-23 RX ADMIN — AMITRIPTYLINE HYDROCHLORIDE 50 MG: 25 TABLET, FILM COATED ORAL at 20:51

## 2023-10-23 RX ADMIN — DICLOFENAC 2 G: 10 GEL TOPICAL at 20:53

## 2023-10-23 RX ADMIN — POTASSIUM CHLORIDE 10 MEQ: 750 TABLET, EXTENDED RELEASE ORAL at 08:33

## 2023-10-23 RX ADMIN — PANTOPRAZOLE SODIUM 40 MG: 40 TABLET, DELAYED RELEASE ORAL at 16:05

## 2023-10-23 RX ADMIN — LIDOCAINE: 50 OINTMENT TOPICAL at 20:53

## 2023-10-23 RX ADMIN — DICLOFENAC 2 G: 10 GEL TOPICAL at 16:05

## 2023-10-23 RX ADMIN — ESCITALOPRAM OXALATE 20 MG: 20 TABLET ORAL at 08:33

## 2023-10-23 RX ADMIN — PANTOPRAZOLE SODIUM 40 MG: 40 TABLET, DELAYED RELEASE ORAL at 06:44

## 2023-10-23 RX ADMIN — LIDOCAINE: 50 OINTMENT TOPICAL at 13:23

## 2023-10-23 RX ADMIN — SENNOSIDES AND DOCUSATE SODIUM 1 TABLET: 8.6; 5 TABLET ORAL at 08:33

## 2023-10-23 RX ADMIN — FERROUS SULFATE TAB 325 MG (65 MG ELEMENTAL FE) 325 MG: 325 (65 FE) TAB at 08:33

## 2023-10-23 RX ADMIN — SENNOSIDES AND DOCUSATE SODIUM 1 TABLET: 8.6; 5 TABLET ORAL at 20:52

## 2023-10-23 RX ADMIN — SODIUM CHLORIDE: 9 INJECTION, SOLUTION INTRAVENOUS at 08:42

## 2023-10-23 RX ADMIN — FOLIC ACID 1 MG: 1 TABLET ORAL at 08:33

## 2023-10-23 ASSESSMENT — ACTIVITIES OF DAILY LIVING (ADL)
ADLS_ACUITY_SCORE: 27

## 2023-10-23 NOTE — PROGRESS NOTES
"CLINICAL NUTRITION SERVICES - REASSESSMENT NOTE     Nutrition Prescription    RECOMMENDATIONS FOR MDs/PROVIDERS TO ORDER:  None    Malnutrition Status:    Does not qualify    Recommendations already ordered by Registered Dietitian (RD):  No new    Future/Additional Recommendations:  Adjust supplement pending intake, weight, tolerance, acceptance, labs     EVALUATION OF PROGRESS TOWARDS GOALS    CURRENT NUTRITION ORDERS  Diet: Regular  Supplement: Ensure enlive daily     Intake/Tolerance: Fair  Eating % of 3 meal/day with most meals > 75%  Pt reports taking 100% of ensure enlive.   Estimate pt meeting 60-70% of estimated kcal and 80% of estimated protein needs with meals alone + supplement meeting % of estimated needs    Receiving NS IVF at 100 ml/hr since 10/21    Pt reports she has little hunger but forces herself to eat. She reports she is eating > baseline here. Discussed inadequate intake without supplement and meeting needs with meals and supplement    LABS  Labs reviewed  K+ 3.5 WNL, improved    MEDICATIONS  Medications reviewed  Feso4, folic acid, levothyroxine, protonix bid, miralax daily, kcl, pericolace bid    ANTHROPOMETRICS  Height: 157.5 cm (5' 2\")  IBW: 50 kg  BMI: Normal BMI  Weight History:   Date/Time Weight Weight Method   10/23/23 0739 52.3 kg (115 lb 4.8 oz) Standing scale   10/15/23 1849 49.2 kg (108 lb 7.5 oz) Bed scale   10/14/23 1945 53.1 kg (117 lb)      Wt Readings from Last 20 Encounters:   10/15/23 49.2 kg (108 lb 7.5 oz)   05/31/23 53.1 kg (117 lb)   01/28/22 52.8 kg (116 lb 8 oz)   03/26/19 38.9 kg (85 lb 12.8 oz)     Weight loss of 9# (7.7%)  in 4 months or less  Weight up 7 lb x 1 week with improved intake and fluid resuscitation    Dosing Weight: 49.2 kg    ASSESSED NUTRITION NEEDS  Estimated Energy Needs: 1475 - 1725 kcals/day (30 - 35 kcals/kg )  Justification: Increased needs, weight loss  Estimated Protein Needs: 59 - 74 grams protein/day (1.2 - 1.5 grams of " pro/kg)  Justification: Increased needs  Estimated Fluid Needs: 1475 mL/day (30 mL/kg), or per provider   Justification: Maintenance    PHYSICAL FINDINGS  Last BM 10/21 x 1 soft    MALNUTRITION:  % Weight Loss:  Weight loss does not meet criteria for malnutrition   % Intake:  Decreased intake does not meet criteria for malnutrition   Subcutaneous Fat Loss: None noted  Muscle Loss: None noted  Fluid Retention:  None noted     Malnutrition Diagnosis: Does not qualify    NUTRITION DIAGNOSIS  Inadequate oral intake related to altered gi function as evidenced by abdominal pain, c/o nausea and vomiting x 6 weeks, weight loss  - improving    INTERVENTIONS  Implementation  None    Goals  Patient to consume % of nutritionally adequate meals three times per day, or the equivalent with supplements/snacks.- met     Monitoring/Evaluation  Progress toward goals will be monitored and evaluated per protocol.

## 2023-10-23 NOTE — PROGRESS NOTES
Pt. On 1 liter 02 to  maintain sats  at or above 90% . Has transitioned to oral antibiotics.   Lungs diminished in lower bases.   Encouraged deep breathing exercises.   Denies nausea this shift but had tylenol and Voltaren cream for neck pain.   Continues on normal saline, and BP is better at 139/82

## 2023-10-23 NOTE — PLAN OF CARE
Goal Outcome Evaluation:      Plan of Care Reviewed With: patient    Overall Patient Progress: no change    Patient alert and oriented x4. New Weight was taken today. Patient is on K+ protocols - am draw tomorrow. Patient has villarreal. Patient has NACL running at 100. Patient is on Contact precautions. Patient has 1 liter O2 NC. Patient is on a regular diet. Patient is being followed by OT/PT. Patient has been up in chair for shift.

## 2023-10-23 NOTE — PLAN OF CARE
Problem: Adult Inpatient Plan of Care  Goal: Plan of Care Review  Description: The Plan of Care Review/Shift note should be completed every shift.  The Outcome Evaluation is a brief statement about your assessment that the patient is improving, declining, or no change.  This information will be displayed automatically on your shift  note.  Outcome: Progressing   Goal Outcome Evaluation:    Patient is alert and oriented. She slept majority of the night. Right PIV in place with NS infusing at 100mL/hr. Pina in place for retention. K protocal, 4.2 at last check, check in AM. 1 liter 02 per nasal canula. 1 assist/stand by. Regular diet.   Aracely Blakely RN

## 2023-10-23 NOTE — PROGRESS NOTES
Care Management Follow Up    Length of Stay (days): 8    Expected Discharge Date: 10/24/2023     Concerns to be Addressed:     Medical progression and discharge planning  Patient plan of care discussed at interdisciplinary rounds: Yes    Anticipated Discharge Disposition:  TCU     Anticipated Discharge Services:  per TCU  Anticipated Discharge DME:  per TCU    Patient/family educated on Medicare website which has current facility and service quality ratings:  yes  Education Provided on the Discharge Plan:  yes  Patient/Family in Agreement with the Plan:  yes    Referrals Placed by CM/SW:  TCU  Private pay costs discussed:  not at this time    Additional Information:  PT [facility]CU.  [facility]  [facility], and [facility] ref[facility] pending.  CM called Linda in admissions to check status of referral.   Awaiting call back.    Per MD, patient is medically ready for discharge to TCU.    Linda in admissions called back.  Asking about contact precautions for fleas.  Patient treated x1 with Permethrin  lotion.    MD reports patient does not need more treatment and does not need isolation precautions per CDC.  LETY called Linda back. Linda is looking into patient and placement more.        LETY will continue to monitor medical progression and discharge planning.     Jessica Frias RN

## 2023-10-23 NOTE — PROGRESS NOTES
[facility]    Medicine Progress Note - Hospitalist Service    Date of Admission:  10/14/2023    Assessment & Plan     Keyanna Palafox is a 68 year old female with PMH significant for HTN, hypothyroidism and MDD who is admitted on 10/15/2023 due to Sepsis of unknown source, abdominal pain, nausea and vomiting.     Sepsis of Unknown Source  -Resolved  -- On admission had elevated lactic acid level, leukocytosis, encephalopathy presumed from sepsis.   No clear source.  -CXR no PNA. S/p Cefepime and Vancomycin in ED.   -Empirically treated with IV vancomycin/cefepime on admission. Vanco discontinued. Cefepime switched to PO Cefdinir 10/18 -10/23; finished abx  -Clinically appears stable.  WBC trended down  -BC: NGTD. UC : was not  sent     Gastritis  -presented with ab pain , nausea and vomiting and part of sepsis work up included ab ct  -Abdominal CAT scan reported with thickened gastric body with ulceration noted. Concern for gastritis vs carcinoma.   -Appreciate GI consult.  Signed off 10/18  -S/p upper GI endoscopy on 10/17. Findings- esophagitis with no bleeding, non-bleeding gastric ulcer with no stigmata of bleeding.    -Has been biopsied, pathology report- no malignancy; showed mild and severe chronic gastritis with ulceration.  Negative for H. pylori.  -Plan to repeat EGD in 3 to 4 months to reassess gastric ulcer  -Continue PPI twice daily upon discharge.     Anemia  -Hb=8.7 on admission. It was 12.3 in June 2023. CT abd pel reveals thickened distal gastric body with ulceration anteriorly.   -Patient was found to have nonbleeding gastric ulcer.   -Monitor hemoglobin. No bleeding here. Stools are not black.  -Iron study reveals iron deficiency. Completed 3rd dose of IV iron. Start PO iron therapy  -Borderline folate deficiency noted. Started PO folic acid  -Hgb 7.8 today. No active bleeding. This is likely hemodilution     Leukocytosis  -Resolved  -Finish antibiotics for presumed  sepsis    DELMI  -Likely prerenal.  Improved.  Avoid nephrotoxic agents.      Elevated troponin  - Improved. Initial troponin= 15-->12. Possible demand ischemia vs DELMI.      Flea infestation  - Contact isolation. Trial of Permethrin lotion.     Hypokalemia  - Replace per protocol     Chronic neck pain  -Most likely muscle pain  -Tylenol, oral and IV opioid as needed  -Lidocaine patch  -Pain medicine consulted 10/17 and did not recommend opioids but recommended topicals, outpatient trigger point, Lidocaine and diclofenac gel     Acute urinary retention  -recurrent  --Removed villarreal 10/19. Needed straight cath few times after villarreal removal. ---Villarreal re-inserted 10/21.   ---Will keep the villarreal in for next few days until she is more ambulatory versus TOV at TCU     Hypotension, symptomatic  - Patient felt dizzy when up with therapy on 10/20. BP was down to 67/44  - Positive orthostasis noted. Not eating/drinking much per nursing staff. Encouraged PO intake  - BP improved with 500ml NS bolus.   -- Has been on IV fluids continuously.    --- Stop IV fluids today to see if symptoms recur   --- Suspect poor diet/malnutrition contributed   ---normal echo  ---EKG with sinus tachycardia - no other acute changes  ---normal TSH and cortisol           Diet: Regular Diet Adult  Snacks/Supplements Adult: Ensure Enlive; With Meals    DVT Prophylaxis: Low Risk/Ambulatory with no VTE prophylaxis indicated  Villarreal Catheter: PRESENT, indication: Retention, (P) Retention  Lines: None     Cardiac Monitoring: None  Code Status: Full Code      Clinically Significant Risk Factors        # Hypokalemia: Lowest K = 3.3 mmol/L in last 2 days, will replace as needed       # Hypoalbuminemia: Lowest albumin = 2.8 g/dL at 10/16/2023  5:09 AM, will monitor as appropriate     # Hypertension: Noted on problem list            # Financial/Environmental Concerns: other (see comments) (fleas and dirty home concerns)         Disposition Plan      Expected  "Discharge Date: 10/23/2023,  3:00 PM  Discharge Delays: Placement - TCU    Discharge Comments: TCU placement            Lolis Saldana MD  Hospitalist Service  [facility]  Securely message with [employer] (more info)  Text page via [employer] Paging/Directory   ______________________________________________________________________    Interval History   --- Patient seen and chart reviewed.  She confirms that she is agreeable to TCU \" but only for a couple of days.\"  --- She asks if we are sorted out what she was sick from.  Reviewed test results.    Physical Exam   Vital Signs: Temp: 98.1  F (36.7  C) Temp src: Oral BP: 131/88 Pulse: 102   Resp: 20 SpO2: 90 % O2 Device: Nasal cannula Oxygen Delivery: 1 LPM  Weight: 115 lbs 4.8 oz    General Appearance: Frail in appearance, no apparent distress.  Dentition poor.  Respiratory: Clear to auscultation  Cardiovascular: Regular rate and rhythm  GI: Soft and nontender without hepatosplenomegaly  Skin: No significant lower extremity edema  Other: Neuro grossly intact without focal deficits appreciated.    Medical Decision Making             Data     I have personally reviewed the following data over the past 24 hrs:    N/A  \   N/A   / N/A     140 110 (H) 3.5 (L) /  98   3.5 24 0.47 (L) \       Imaging results reviewed over the past 24 hrs:   No results found for this or any previous visit (from the past 24 hour(s)).  "

## 2023-10-24 ENCOUNTER — APPOINTMENT (OUTPATIENT)
Dept: PHYSICAL THERAPY | Facility: HOSPITAL | Age: 68
DRG: 871 | End: 2023-10-24
Payer: COMMERCIAL

## 2023-10-24 VITALS
TEMPERATURE: 98.2 F | HEART RATE: 111 BPM | SYSTOLIC BLOOD PRESSURE: 137 MMHG | HEIGHT: 62 IN | DIASTOLIC BLOOD PRESSURE: 74 MMHG | OXYGEN SATURATION: 92 % | BODY MASS INDEX: 21.22 KG/M2 | RESPIRATION RATE: 18 BRPM | WEIGHT: 115.3 LBS

## 2023-10-24 LAB
ANION GAP SERPL CALCULATED.3IONS-SCNC: 7 MMOL/L (ref 7–15)
BUN SERPL-MCNC: 2.7 MG/DL (ref 8–23)
CALCIUM SERPL-MCNC: 8.8 MG/DL (ref 8.8–10.2)
CHLORIDE SERPL-SCNC: 105 MMOL/L (ref 98–107)
CREAT SERPL-MCNC: 0.47 MG/DL (ref 0.51–0.95)
DEPRECATED HCO3 PLAS-SCNC: 27 MMOL/L (ref 22–29)
EGFRCR SERPLBLD CKD-EPI 2021: >90 ML/MIN/1.73M2
GLUCOSE SERPL-MCNC: 89 MG/DL (ref 70–99)
POTASSIUM SERPL-SCNC: 3.4 MMOL/L (ref 3.4–5.3)
POTASSIUM SERPL-SCNC: 3.5 MMOL/L (ref 3.4–5.3)
SODIUM SERPL-SCNC: 139 MMOL/L (ref 135–145)

## 2023-10-24 PROCEDURE — 97110 THERAPEUTIC EXERCISES: CPT | Mod: GP

## 2023-10-24 PROCEDURE — 80048 BASIC METABOLIC PNL TOTAL CA: CPT | Performed by: INTERNAL MEDICINE

## 2023-10-24 PROCEDURE — 97116 GAIT TRAINING THERAPY: CPT | Mod: GP

## 2023-10-24 PROCEDURE — 36415 COLL VENOUS BLD VENIPUNCTURE: CPT | Performed by: INTERNAL MEDICINE

## 2023-10-24 PROCEDURE — 250N000013 HC RX MED GY IP 250 OP 250 PS 637: Performed by: FAMILY MEDICINE

## 2023-10-24 PROCEDURE — 250N000013 HC RX MED GY IP 250 OP 250 PS 637: Performed by: INTERNAL MEDICINE

## 2023-10-24 PROCEDURE — 84132 ASSAY OF SERUM POTASSIUM: CPT | Performed by: STUDENT IN AN ORGANIZED HEALTH CARE EDUCATION/TRAINING PROGRAM

## 2023-10-24 PROCEDURE — 250N000013 HC RX MED GY IP 250 OP 250 PS 637: Performed by: STUDENT IN AN ORGANIZED HEALTH CARE EDUCATION/TRAINING PROGRAM

## 2023-10-24 PROCEDURE — 36415 COLL VENOUS BLD VENIPUNCTURE: CPT | Performed by: STUDENT IN AN ORGANIZED HEALTH CARE EDUCATION/TRAINING PROGRAM

## 2023-10-24 PROCEDURE — 99239 HOSP IP/OBS DSCHRG MGMT >30: CPT | Performed by: FAMILY MEDICINE

## 2023-10-24 PROCEDURE — 250N000013 HC RX MED GY IP 250 OP 250 PS 637: Performed by: PHYSICIAN ASSISTANT

## 2023-10-24 RX ORDER — PANTOPRAZOLE SODIUM 40 MG/1
40 TABLET, DELAYED RELEASE ORAL
Start: 2023-10-24 | End: 2023-11-14

## 2023-10-24 RX ORDER — AMOXICILLIN 250 MG
1 CAPSULE ORAL 2 TIMES DAILY
Start: 2023-10-24 | End: 2023-11-14

## 2023-10-24 RX ORDER — FOLIC ACID 1 MG/1
1 TABLET ORAL DAILY
Start: 2023-10-25 | End: 2023-11-14

## 2023-10-24 RX ORDER — POTASSIUM CHLORIDE 1500 MG/1
40 TABLET, EXTENDED RELEASE ORAL ONCE
Status: COMPLETED | OUTPATIENT
Start: 2023-10-24 | End: 2023-10-24

## 2023-10-24 RX ORDER — ATENOLOL 25 MG/1
100 TABLET ORAL DAILY
Start: 2023-10-24 | End: 2023-11-14

## 2023-10-24 RX ORDER — FERROUS SULFATE 325(65) MG
325 TABLET ORAL DAILY
Start: 2023-10-24 | End: 2023-11-14

## 2023-10-24 RX ORDER — POLYETHYLENE GLYCOL 3350 17 G/17G
17 POWDER, FOR SOLUTION ORAL DAILY
Start: 2023-10-24 | End: 2023-11-14

## 2023-10-24 RX ADMIN — ESCITALOPRAM OXALATE 20 MG: 20 TABLET ORAL at 08:55

## 2023-10-24 RX ADMIN — SENNOSIDES AND DOCUSATE SODIUM 1 TABLET: 8.6; 5 TABLET ORAL at 08:55

## 2023-10-24 RX ADMIN — POTASSIUM CHLORIDE 10 MEQ: 750 TABLET, EXTENDED RELEASE ORAL at 08:58

## 2023-10-24 RX ADMIN — OXYCODONE HYDROCHLORIDE 2.5 MG: 5 TABLET ORAL at 00:58

## 2023-10-24 RX ADMIN — LEVOTHYROXINE SODIUM 112 MCG: 0.11 TABLET ORAL at 05:43

## 2023-10-24 RX ADMIN — POTASSIUM CHLORIDE 40 MEQ: 1500 TABLET, EXTENDED RELEASE ORAL at 08:55

## 2023-10-24 RX ADMIN — FERROUS SULFATE TAB 325 MG (65 MG ELEMENTAL FE) 325 MG: 325 (65 FE) TAB at 13:38

## 2023-10-24 RX ADMIN — POLYETHYLENE GLYCOL 3350 17 G: 17 POWDER, FOR SOLUTION ORAL at 08:56

## 2023-10-24 RX ADMIN — FOLIC ACID 1 MG: 1 TABLET ORAL at 08:55

## 2023-10-24 RX ADMIN — PANTOPRAZOLE SODIUM 40 MG: 40 TABLET, DELAYED RELEASE ORAL at 05:43

## 2023-10-24 ASSESSMENT — ACTIVITIES OF DAILY LIVING (ADL)
ADLS_ACUITY_SCORE: 27

## 2023-10-24 NOTE — PLAN OF CARE
"  Problem: Adult Inpatient Plan of Care  Goal: Plan of Care Review  Description: The Plan of Care Review/Shift note should be completed every shift.  The Outcome Evaluation is a brief statement about your assessment that the patient is improving, declining, or no change.  This information will be displayed automatically on your shift  note.  Outcome: Progressing  Goal: Patient-Specific Goal (Individualized)  Description: You can add care plan individualizations to a care plan. Examples of Individualization might be:  \"Parent requests to be called daily at 9am for status\", \"I have a hard time hearing out of my right ear\", or \"Do not touch me to wake me up as it startles  me\".  Outcome: Progressing  Goal: Absence of Hospital-Acquired Illness or Injury  Outcome: Progressing  Intervention: Identify and Manage Fall Risk  Recent Flowsheet Documentation  Taken 10/23/2023 1611 by Alexus Emerson RN  Safety Promotion/Fall Prevention:   activity supervised   assistive device/personal items within reach  Intervention: Prevent Infection  Recent Flowsheet Documentation  Taken 10/23/2023 1611 by Alexus Emerson RN  Infection Prevention:   single patient room provided   hand hygiene promoted  Goal: Optimal Comfort and Wellbeing  Outcome: Progressing  Goal: Readiness for Transition of Care  Outcome: Progressing     Problem: Gas Exchange Impaired  Goal: Optimal Gas Exchange  Outcome: Progressing     Problem: Pain Acute  Goal: Optimal Pain Control and Function  Outcome: Progressing  Intervention: Prevent or Manage Pain  Recent Flowsheet Documentation  Taken 10/23/2023 1611 by Alexus Emerson RN  Bowel Elimination Promotion: adequate fluid intake promoted  Medication Review/Management: medications reviewed     Problem: Oral Intake Inadequate  Goal: Improved Oral Intake  Outcome: Progressing     Problem: Fluid Volume Deficit  Goal: Fluid Balance  Outcome: Progressing     Problem: Malnutrition  Goal: Improved Nutritional " Intake  Outcome: Progressing     Problem: Urinary Retention  Goal: Effective Urinary Elimination  Outcome: Progressing   Goal Outcome Evaluation:       Pt is alert and oriented x4, ate 100% of her dinner order. Denies pain, 1 L oxygen NC. Vitals are stable and will continue to monitor.

## 2023-10-24 NOTE — PLAN OF CARE
Occupational Therapy Discharge Summary    Reason for therapy discharge:    Discharged to transitional care facility.    Progress towards therapy goal(s). See goals on Care Plan in [facility] electronic health record for goal details.  Goals partially met.  Barriers to achieving goals:   discharge from facility.    Therapy recommendation(s):    Continued therapy is recommended.  Rationale/Recommendations:  to maximize ADL independence.

## 2023-10-24 NOTE — PLAN OF CARE
Problem: Adult Inpatient Plan of Care  Goal: Optimal Comfort and Wellbeing  Outcome: Progressing   Goal Outcome Evaluation:patient reports back and generalized pain, oxycodone 2.5mg and she reports pain improvement. Patient slept between cares and she is alert and oriented.

## 2023-10-24 NOTE — DISCHARGE SUMMARY
[facility]  Hospitalist Discharge Summary      Date of Admission:  10/14/2023  Date of Discharge:  10/24/2023  2:47 PM  Discharging Provider: Lolis Saldana MD  Discharge Service: Hospitalist Service    Discharge Diagnoses     Sepsis, resolved.  Unknown source  Gastritis with ulceration  Iron deficiency anemia, secondary to above; no evidence acute blood loss  Acute kidney injury, resolved  Chronic neck pain  Recurrent urinary retention  Flea infestation, treated, improved  Hypertension/meds adjusted due to symptomatic hypotension while here; suspect underlying poor nutrition in the setting of sepsis  Acute respiratory failure; advised wean of oxygen at TCU.  Unclear cause    Clinically Significant Risk Factors          Follow-ups Needed After Discharge   Follow-up Appointments     Follow Up and recommended labs and tests      Follow up with [facility] physician.  The following labs/tests are   recommended: BMP and hgb in 5-6 days  Follow up with [facility] for follow up endoscopy in 1-2 months.  No NSAIDS                Discharge Disposition   Discharged to short-term care facility  Condition at discharge: Stable    Hospital Course   Keyanna Palafox is a 68 year old female with PMH significant for HTN, hypothyroidism and MDD who is admitted on 10/15/2023 due to Sepsis of unknown source, abdominal pain, nausea and vomiting.  Patient was treated with IV fluids, antibiotics for which she finished course, GI consult and underwent GI endoscopy due to concerns for abnormal CAT scan showing gastric body thickening with findings of gastritis and ulceration, and patient improved.  Had symptomatic hypotension felt from anemia and deconditioning with hypertension medications adjusted.  Did require IV fluids but was off of these for more than 24 hours prior to discharge with stabilization of her blood pressure on less medications than she was on prior to admission.  Also, was on oxygen intermittently  while here but in general only 1 L.  Able to wean to off prior to discharge and would advise to wean at TCU.  Overall was improved with PT OT recommending TCU and transferred TCU in good condition.  Detailed hospital course per below     Sepsis of Unknown Source  -Resolved  -- On admission had elevated lactic acid level, leukocytosis, encephalopathy presumed from sepsis.   No clear source.  -CXR no PNA. S/p Cefepime and Vancomycin in ED.   -Empirically treated with IV vancomycin/cefepime on admission. Vanco discontinued. Cefepime switched to PO Cefdinir 10/18 -10/23; finished abx  -Clinically appears stable.  WBC trended down  -BC: NGTD. UC : was not  sent     Gastritis  -presented with ab pain , nausea and vomiting and part of sepsis work up included ab ct  -Abdominal CAT scan reported with thickened gastric body with ulceration noted. Concern for gastritis vs carcinoma.   -Appreciate GI consult.  Signed off 10/18  -S/p upper GI endoscopy on 10/17. Findings- esophagitis with no bleeding, non-bleeding gastric ulcer with no stigmata of bleeding.    -Has been biopsied, pathology report- no malignancy; showed mild and severe chronic gastritis with ulceration.  Negative for H. pylori.  -Plan to repeat EGD in 3 to 4 months to reassess gastric ulcer  -Continue PPI twice daily upon discharge.     Anemia  -Hb=8.7 on admission. It was 12.3 in June 2023. CT abd pel reveals thickened distal gastric body with ulceration anteriorly.   -Patient was found to have nonbleeding gastric ulcer.   -Monitor hemoglobin. No bleeding here. Stools are not black.  -Iron study reveals iron deficiency. Completed 3rd dose of IV iron.   --did Start PO iron therapy  -Borderline folate deficiency noted. Started PO folic acid  -Hgb 7.8 today. No active bleeding. This is likely hemodilution     Flea infestation  - Contact isolation. Treated with Permethrin lotion.     Chronic neck pain  -Most likely muscle pain  -Tylenol, oral and IV opioid as  needed  -Lidocaine patch  -Pain medicine consulted 10/17 and did not recommend opioids but recommended topicals, outpatient trigger point, Lidocaine and diclofenac gel     Acute urinary retention  -recurrent  --Removed villarreal 10/19. Needed straight cath few times after villarreal removal. ---Villarreal re-inserted 10/21.   ---Will keep the villarreal in for next few days   ---rec TOV at TCU        Consultations This Hospital Stay   PHARMACY TO DOSE VANCO  PHARMACY TO DOSE VANCO  GASTROENTEROLOGY IP CONSULT  CARE MANAGEMENT / SOCIAL WORK IP CONSULT  PAIN MANAGEMENT ADULT IP CONSULT  PHYSICAL THERAPY ADULT IP CONSULT  OCCUPATIONAL THERAPY ADULT IP CONSULT  PHYSICAL THERAPY ADULT IP CONSULT  OCCUPATIONAL THERAPY ADULT IP CONSULT    Code Status   Full Code    Time Spent on this Encounter   I, Lolis Saldana MD, personally saw the patient today and spent greater than 30 minutes discharging this patient.       Lolis Saldana MD  [address] [address]  Phone: [phone]  Fax: [phone]  ______________________________________________________________________    Physical Exam   Vital Signs: Temp: 98.2  F (36.8  C) Temp src: Oral BP: 137/74 Pulse: 111   Resp: 18 SpO2: 92 % O2 Device: Nasal cannula Oxygen Delivery: 1 LPM  Weight: 115 lbs 4.8 oz  General Appearance: Pleasant frail in no apparent distress  Respiratory: Clear to auscultation bilaterally  Cardiovascular: Regular rate and rhythm without murmurs rubs or gallops  GI: Soft and nontender without hepatosplenomegaly  Skin: Trace lower extremity edema  Other: Neurologically grossly intact without focal deficits appreciated       Primary Care Physician   FATOU RUTHERFORD    Discharge Orders      General info for SNF    Length of Stay Estimate: Short Term Care: Estimated # of Days <30  Condition at Discharge: Improving  Level of care:skilled   Rehabilitation Potential: Excellent  Admission H&P remains valid and  up-to-date: Yes  Recent Chemotherapy: N/A  Use Nursing Home Standing Orders: Yes     Mantoux instructions    Give two-step Mantoux (PPD) Per Facility Policy Yes     Follow Up and recommended labs and tests    Follow up with [facility] physician.  The following labs/tests are recommended: BMP and hgb in 5-6 days  Follow up with [facility] for follow up endoscopy in 1-2 months.  No NSAIDS     Reason for your hospital stay    Sepsis - Gastric ulcer     Villarreal catheter    Recommned removal for TOV 10/26     Bladder scan    X 2 for post void residual after villarreal discontinued.  When villarreal removed, recommend: Check PVRs. If < 200 ml x 2, discontinue PVR checks. If > 400 ml, straight cath. If requires > 2 straight catheterizations, place villarreal catheter.       Activity - Up ad shanna     Full Code     Physical Therapy Adult Consult    Evaluate and treat as clinically indicated.    Reason:  weakness     Occupational Therapy Adult Consult    Evaluate and treat as clinically indicated.    Reason:  weakness     Oxygen (SNF/TCU) Discharge     Diet    Follow this diet upon discharge: Orders Placed This Encounter      Snacks/Supplements Adult: Ensure Enlive; With Meals      Regular Diet Adult       Significant Results and Procedures   Most Recent 3 CBC's:  Recent Labs   Lab Test 10/22/23  0947 10/21/23  0642 10/19/23  0550 10/18/23  0535 10/17/23  0532   WBC  --  6.5  --  6.7 12.9*   HGB 7.8* 7.5* 9.2* 8.6* 8.9*   MCV  --  87  --  86 86   PLT  --  259  --  424 439     Most Recent 3 BMP's:  Recent Labs   Lab Test 10/24/23  1244 10/24/23  0456 10/23/23  0826 10/23/23  0547 10/22/23  1413 10/22/23  0733   NA  --  139  --  140  --  140   POTASSIUM 3.5 3.4  --  3.5   < > 3.3*   CHLORIDE  --  105  --  110*  --  109*   CO2  --  27  --  24  --  23   BUN  --  2.7*  --  3.5*  --  5.0*   CR  --  0.47*  --  0.47*  --  0.51   ANIONGAP  --  7  --  6*  --  8   KAREN  --  8.8  --  8.5*  --  8.2*   GLC  --  89 98 91  --  101*    < > = values in this  interval not displayed.     7-Day Micro Results       No results found for the last 168 hours.        ,   Results for orders placed or performed during the hospital encounter of 10/14/23   CT Abdomen Pelvis w Contrast    Narrative    EXAM: CT ABDOMEN PELVIS W CONTRAST  LOCATION: [facility]  DATE: 10/14/2023    INDICATION: Abdominal pain, vomiting.  COMPARISON: None.  TECHNIQUE: CT scan of the abdomen and pelvis was performed following injection of IV contrast. Multiplanar reformats were obtained. Dose reduction techniques were used.  CONTRAST: 75 mL Isovue 370.    FINDINGS:   LOWER CHEST: Cylindrical bronchiectasis in the lung bases. Mild bronchiectasis and consolidation within the lingula.    HEPATOBILIARY: Normal.    PANCREAS: Normal.    SPLEEN: Normal.    ADRENAL GLANDS: Normal.    KIDNEYS/BLADDER: Tiny nonobstructive stone left mid kidney.    BOWEL: Wall thickening distal gastric body with ulceration anteriorly. A few mildly prominent surrounding lymph nodes. Diagnostic considerations include carcinoma versus gastritis. Normal appendix.    LYMPH NODES: Normal.    VASCULATURE: Unremarkable.    PELVIC ORGANS: Normal.    MUSCULOSKELETAL: Normal.      Impression    IMPRESSION:   1.  Wall thickening distal gastric body with ulceration anteriorly. There are a few mildly prominent surrounding lymph nodes. Diagnostic considerations include carcinoma versus gastritis.    2.  Tiny nonobstructing stone within the left kidney. No ureteric stone or hydronephrosis.    3.  Mild cylindrical bronchiectasis in the lung bases and lingula. Mild consolidation in the lingula.   XR Chest Port 1 View    Narrative    EXAM: XR CHEST PORT 1 VIEW  LOCATION: [facility]  DATE: 10/14/2023    INDICATION: cough elevated lactic acid evaluate for pneumonia  COMPARISON: 10/03/2016      Impression    IMPRESSION: Heart size within normal limits for portable technique. Given patient's leftward  rotation. Calcified aortic arch. Mildly hyperinflated lungs. No focal airspace consolidation. No visible pneumothorax or pleural effusion. Osteopenia.   Echocardiogram Complete     Value    LVEF  65-70%    [facility]    [id number]  [id number]  [id number]  [id number]^DILEEP^MARGARITO^[place] [address]     Name: ANDREW GODFREY  MRN: [id number]  : 1955  Study Date: 10/22/2023 01:35 PM  Age: 68 yrs  Gender: Female  Patient Location: [facility]  Reason For Study: Syncope  Ordering Physician: MARGARITO FALK  Performed By: RUY     BSA: 1.5 m2  Height: 62 in  Weight: 108 lb  BP: 112/64 mmHg  ______________________________________________________________________________  Procedure  Complete Portable Echo Adult. Adequate quality two-dimensional was performed  and interpreted. Adequate quality color and spectral Doppler were performed  and interpreted. There is no comparison study available.  ______________________________________________________________________________  Interpretation Summary     1. Left ventricular chamber size, wall thickness and systolic function are  normal. The visually estimated left ventricular ejection fraction is 65-70%.  2. Right ventricular chamber size and systolic function are normal.  3. No hemodynamically significant valvular abnormalities.  4. Mild pulmonary hypertension is present with an estimated pulmonary artery  systolic pressure of 37 mmHg.  5. No prior study available for comparison.  ______________________________________________________________________________  I      WMSI = 1.00     % Normal = 100     X - Cannot   0 -                      (2) - Mildly 2 -          Segments  Size  Interpret    Hyperkinetic 1 - Normal  Hypokinetic  Hypokinetic  1-2     small                                                     7 -          3-5      moderate  3 - Akinetic 4 -          5 -         6 - Akinetic Dyskinetic   6-14    large                Dyskinetic   Aneurysmal  w/scar       w/scar       15-16   diffuse     Left Ventricle  The left ventricle is normal in size. There is normal left ventricular wall  thickness. Left ventricular systolic function is normal. The visual ejection  fraction is 65-70%. Diastolic function not assessed due to tachycardia. No  regional wall motion abnormalities noted.     Right Ventricle  Normal right ventricle size and systolic function.     Atria  Normal left atrial size. Right atrial size is normal.     Mitral Valve  Mitral valve leaflets appear normal. There is trace mitral regurgitation.  There is no mitral valve stenosis.     Tricuspid Valve  Tricuspid valve leaflets appear normal. There is trace tricuspid  regurgitation. The right ventricular systolic pressure is elevated at 33.6  mmHg. Mild (35-45mmHg) pulmonary hypertension is present. There is no  tricuspid stenosis.     Aortic Valve  The aortic valve is trileaflet with aortic valve sclerosis. No aortic  regurgitation is present. No aortic stenosis is present.     Pulmonic Valve  The pulmonic valve is not well visualized. There is no pulmonic valvular  regurgitation. There is no pulmonic valvular stenosis.     Vessels  The aorta root is normal. The thoracic aorta is normal. IVC diameter <2.1 cm  collapsing >50% with sniff suggests a normal RA pressure of 3 mmHg.     Pericardium  There is no pericardial effusion.     Rhythm  The rhythm was sinus tachycardia.     ______________________________________________________________________________  MMode/2D Measurements & Calculations  IVSd: 0.72 cm  LVIDd: 3.9 cm  LVIDs: 2.8 cm  LVPWd: 0.84 cm     FS: 28.9 %  LV mass(C)d: 88.0 grams  LV mass(C)dI: 59.8 grams/m2  Ao root diam: 2.8 cm  LA dimension: 3.0 cm  asc Aorta Diam: 3.1 cm  LA/Ao: 1.1  LVOT diam: 1.8 cm  LVOT area: 2.7 cm2  Ao root diam index Ht(cm/m): 1.7  Ao root diam index BSA (cm/m2): 1.9  Asc Ao diam index BSA (cm/m2): 2.1  Asc Ao diam index Ht(cm/m): 2.0  LA  Volume (BP): 33.1 ml     LA Volume Index (BP): 22.5 ml/m2  LA Volume Indexed (AL/bp): 26.9 ml/m2  RV Base: 2.1 cm  RWT: 0.43  TAPSE: 2.5 cm     Time Measurements  MM HR: 104.0 BPM     Doppler Measurements & Calculations  MV E max tacos: 75.0 cm/sec  MV A max tacos: 102.8 cm/sec  MV E/A: 0.73  MV dec time: 0.35 sec  LV V1 max P.6 mmHg  LV V1 max: 106.7 cm/sec  LV V1 VTI: 17.4 cm  SV(LVOT): 46.6 ml  SI(LVOT): 31.7 ml/m2  PA acc time: 0.12 sec  TR max tacos: 289.9 cm/sec  TR max P.6 mmHg  E/E' av.4  Lateral E/e': 6.1  Medial E/e': 10.6  RV S Tacos: 12.7 cm/sec     ______________________________________________________________________________  Report approved by: Cr Bender 10/22/2023 02:31 PM             Discharge Medications   Discharge Medication List as of 10/24/2023  2:07 PM        START taking these medications    Details   ferrous sulfate (FEROSUL) 325 (65 Fe) MG tablet Take 1 tablet (325 mg) by mouth daily, No Print Out      folic acid (FOLVITE) 1 MG tablet Take 1 tablet (1 mg) by mouth daily, No Print Out      pantoprazole (PROTONIX) 40 MG EC tablet Take 1 tablet (40 mg) by mouth 2 times daily (before meals), No Print Out      polyethylene glycol (MIRALAX) 17 GM/Dose powder Take 17 g by mouth daily, No Print Out      senna-docusate (SENOKOT-S/PERICOLACE) 8.6-50 MG tablet Take 1 tablet by mouth 2 times daily, No Print Out           CONTINUE these medications which have CHANGED    Details   atenolol (TENORMIN) 25 MG tablet Take 4 tablets (100 mg) by mouth daily, No Print Out           CONTINUE these medications which have NOT CHANGED    Details   amoxapine (ASENDIN) 50 MG tablet Take 50 mg by mouth at bedtime, Historical      escitalopram (LEXAPRO) 20 MG tablet TAKE 1 TABLET BY MOUTH DAILY, Disp-90 tablet, R-3, E-PrescribeRequesting 1 year supply      levothyroxine (SYNTHROID/LEVOTHROID) 112 MCG tablet TAKE 1 TABLET BY MOUTH DAILY, Disp-90 tablet, R-1, E-Prescribe      potassium chloride ER (KLOR-CON  M) 10 MEQ CR tablet TAKE 1 TABLET BY MOUTH DAILY, Disp-90 tablet, R-1, E-Prescribe      tiZANidine (ZANAFLEX) 4 MG tablet Take 1 tablet (4 mg) by mouth 2 times daily as needed for muscle spasms (headache), Disp-180 tablet, R-1, E-Prescribe           STOP taking these medications       amLODIPine (NORVASC) 10 MG tablet Comments:   Reason for Stopping:         cloNIDine (CATAPRES) 0.2 MG tablet Comments:   Reason for Stopping:         indomethacin (INDOCIN) 25 MG capsule Comments:   Reason for Stopping:         lisinopril (ZESTRIL) 20 MG tablet Comments:   Reason for Stopping:             Allergies   Allergies   Allergen Reactions    Ace Inhibitors Cough     NOTE:  patient doesn't recall this reaction; she is currently on Lisinopril without problems!!      Ampicillin Itching    Sulfa (Sulfonamide Antibiotics) [Sulfa Antibiotics] Nausea

## 2023-10-24 NOTE — PROGRESS NOTES
Care Management Discharge Note    Discharge Date: 10/24/2023       Discharge Disposition: Transitional Care    Discharge Services: None    Discharge DME: Oxygen    Discharge Transportation:  [facility]    Private pay costs discussed: transportation costs    Does the patient's insurance plan have a 3 day qualifying hospital stay waiver?  N/a    PAS Confirmation Code: [id number]    Education Provided on the Discharge Plan:  per care team   Persons Notified of Discharge Plans: patient, grandson, admission      Patient/Family in Agreement with the Plan: yes    Handoff Referral Completed: Yes    Additional Information:    Met with patient to confirm agreement for discharge to [facility]. She states agreement. Unable to reach davidaLebron but voice message left regarding discharge place and time.     12:06 PM  Received call back from [facility]. TCU information provided. He states patient will be moving in with him after TCU stay.      Sintia Johnson RN

## 2023-11-06 ENCOUNTER — TELEPHONE (OUTPATIENT)
Dept: FAMILY MEDICINE | Facility: CLINIC | Age: 68
End: 2023-11-06
Payer: COMMERCIAL

## 2023-11-06 ENCOUNTER — PATIENT OUTREACH (OUTPATIENT)
Dept: CARE COORDINATION | Facility: CLINIC | Age: 68
End: 2023-11-06
Payer: COMMERCIAL

## 2023-11-06 RX ORDER — ONDANSETRON 4 MG/1
TABLET, FILM COATED ORAL EVERY 8 HOURS PRN
COMMUNITY

## 2023-11-06 ASSESSMENT — ACTIVITIES OF DAILY LIVING (ADL): DEPENDENT_IADLS:: CLEANING;LAUNDRY;SHOPPING

## 2023-11-06 NOTE — TELEPHONE ENCOUNTER
S-(situation): Patient uncertain if she should take potassium    B-(background): Patient previously taking potassium 10 MEQ CR tablet prior to recent hospitalization.    A-(assessment): Patient was inpatient at [facility] 10/14/23-10/24/23 and then transferred to [facility] discharged home on 11/5/23.  Patient states potassium was not on her medication discharge list.    Patient has a follow-up scheduled with PCP on 11/14/23.    R-(recommendations/plan): Please advise if patient should resume taking potassium 10 MEQ daily that she has returned home or if she should wait until her follow-up with PCP scheduled 11/24/23.    Melissa Behl BSN, RN, PHN, CCM  RN Clinical Product Navigator  [phone]

## 2023-11-06 NOTE — PROGRESS NOTES
Clinic Care Coordination Contact  Clinic Care Coordination Contact  OUTREACH    Referral Information:  Referral Source: Health Plan    Primary Diagnosis: SIRS/Sepsis    Chief Complaint   Patient presents with    Clinic Care Coordination - Initial        Universal Utilization:   Clinic Utilization  Difficulty keeping appointments:: No  Compliance Concerns: No  No-Show Concerns: No  No PCP office visit in Past Year: No  Utilization      No Show Count (past year)  1             ED Visits  1             Hospital Admissions  1                    Current as of: 11/3/2023  1:50 PM                Clinical Concerns:  Current Medical Concerns: Patient was inpatient at [facility] 10/14/23-10/24/23 and then transferred to [facility] discharged home on 11/5/23.    Patient reports doing well since TCU discharge.  Patient states she is living with her grandson Jamarcus.  Patient reports her breathing and abdominal symptoms resolved and denies shortness of breath, cough, nausea, vomiting, diarrhea and constipation.  Patient states she has had a great improvement in her appetite and nutrition since hospital discharge and this has continued since she returned home.    Current Behavioral Concerns: No concerns reported.      Education Provided to patient: RN Clinical Product Navigator educated patient on writer's role, MTM services, Care Coordination services and assisted patient in scheduling a follow-up with her PCP.     Pain  Pain (GOAL):: No  Health Maintenance Reviewed: Due/Overdue   Health Maintenance Due   Topic Date Due    DEXA  Never done    ADVANCE CARE PLANNING  Never done    DEPRESSION ACTION PLAN  Never done    COLORECTAL CANCER SCREENING  Never done    ZOSTER IMMUNIZATION (1 of 2) Never done    MAMMO SCREENING  07/03/2008    LUNG CANCER SCREENING  08/22/2013    RSV VACCINE (Pregnancy & 60+) (1 - 1-dose 60+ series) Never done    DTAP/TDAP/TD IMMUNIZATION (2 - Td or Tdap) 09/08/2019     COVID-19 Vaccine (5 - 2023-24 season) 09/01/2023    PHQ-9  11/30/2023      Clinical Pathway: None    Medication Management:  Medication review status: Medications reviewed.  Changes noted per patient report.  See 11/6/23 telephone encounter sent to PCP; patient was not discharged on potassium 10 meq daily, which she was previously on prior to hospitalization.  Patient inquires if she should resume/continue this medication.     Functional Status:  Dependent ADLs:: Independent  Dependent IADLs:: Cleaning, Laundry, Shopping  Bed or wheelchair confined:: No  Mobility Status: Independent  Fallen 2 or more times in the past year?: No  Any fall with injury in the past year?: No    Living Situation:  Current living arrangement:: I live in a private home with family  [facility]    Lifestyle & Psychosocial Needs:    Social Determinants of Health     Food Insecurity: Not on file   Depression: Not at risk (5/31/2023)    PHQ-2     PHQ-2 Score: 0   Housing Stability: Not on file   Tobacco Use: High Risk (10/17/2023)    Patient History     Smoking Tobacco Use: Every Day     Smokeless Tobacco Use: Former     Passive Exposure: Not on file   Financial Resource Strain: Not on file   Alcohol Use: Not on file   Transportation Needs: Not on file   Physical Activity: Not on file   Interpersonal Safety: Not on file   Stress: Not on file   Social Connections: Not on file     Inadequate nutrition (GOAL):: No  Tube Feeding: No  Inadequate activity/exercise (GOAL):: No  Significant changes in sleep pattern (GOAL): No  Transportation means:: Regular car     Jainism or spiritual beliefs that impact treatment:: No  Mental health DX:: Yes  Mental health DX how managed:: Medication  Informal Support system:: Family (grandson)     Patient was noted by inpatient care coordination team to have home concerns; fleas and dirty home.  Patient is now living with her grandson Jamarcus.    Resources and  Interventions:  Current Resources:      Community Resources: None  Supplies Currently Used at Home: None  Equipment Currently Used at Home: none (Patient has walker and cane available if needed)  Employment Status: retired ([employer])         Advance Care Plan/Directive  Advanced Care Plans/Directives on file:: No    Referrals Placed: None         Care Plan:  TBD; patient would like to ensure she prevents a hospital or ED readmission.    Patient/Caregiver understanding: Yes, patient declined MTM services, but would like to participate in RN Care Coordination services.  Initial assessment scheduled after her PCP follow-up per patient request; 11/16/23 at 10:00 am.       Future Appointments                In 1 week Abby Joshi MD [facility]    In 1 week [facility] CCC RN [facility]            Plan:   Telephone encounter sent to PCP to advise if patient should resume/continue potassium.  Patient will follow-up with PCP as scheduled.  Patient will participate in RN Care Coordination assessment as scheduled.    Melissa Behl BSN, RN, PHN, CCM  RN Clinical Product Navigator  [phone]

## 2023-11-07 NOTE — TELEPHONE ENCOUNTER
I don't have any information from the TCU.  But - if she wasn't sent home with potassium and it's not on her discharge medication list, she could hold it until seen next week.

## 2023-11-09 NOTE — TELEPHONE ENCOUNTER
Called patient and relayed message from Dr. Vaz. She verbalized understanding. Plans to bring all of her medications with her on 11/14 for review.

## 2023-11-14 ENCOUNTER — OFFICE VISIT (OUTPATIENT)
Dept: FAMILY MEDICINE | Facility: CLINIC | Age: 68
End: 2023-11-14
Payer: COMMERCIAL

## 2023-11-14 VITALS
RESPIRATION RATE: 20 BRPM | HEART RATE: 81 BPM | HEIGHT: 62 IN | OXYGEN SATURATION: 95 % | TEMPERATURE: 97.2 F | WEIGHT: 103 LBS | BODY MASS INDEX: 18.95 KG/M2 | DIASTOLIC BLOOD PRESSURE: 98 MMHG | SYSTOLIC BLOOD PRESSURE: 144 MMHG

## 2023-11-14 DIAGNOSIS — Z23 NEED FOR COVID-19 VACCINE: ICD-10-CM

## 2023-11-14 DIAGNOSIS — E03.9 HYPOTHYROIDISM, UNSPECIFIED TYPE: ICD-10-CM

## 2023-11-14 DIAGNOSIS — D64.9 ANEMIA, UNSPECIFIED TYPE: ICD-10-CM

## 2023-11-14 DIAGNOSIS — Z12.11 SCREEN FOR COLON CANCER: ICD-10-CM

## 2023-11-14 DIAGNOSIS — Z09 HOSPITAL DISCHARGE FOLLOW-UP: Primary | ICD-10-CM

## 2023-11-14 DIAGNOSIS — F33.1 MODERATE EPISODE OF RECURRENT MAJOR DEPRESSIVE DISORDER (H): ICD-10-CM

## 2023-11-14 DIAGNOSIS — Z12.31 VISIT FOR SCREENING MAMMOGRAM: ICD-10-CM

## 2023-11-14 DIAGNOSIS — I10 ESSENTIAL HYPERTENSION: ICD-10-CM

## 2023-11-14 DIAGNOSIS — K29.01 ACUTE GASTRITIS WITH HEMORRHAGE, UNSPECIFIED GASTRITIS TYPE: ICD-10-CM

## 2023-11-14 LAB
BASOPHILS # BLD AUTO: 0 10E3/UL (ref 0–0.2)
BASOPHILS NFR BLD AUTO: 0 %
EOSINOPHIL # BLD AUTO: 0 10E3/UL (ref 0–0.7)
EOSINOPHIL NFR BLD AUTO: 0 %
ERYTHROCYTE [DISTWIDTH] IN BLOOD BY AUTOMATED COUNT: 18.4 % (ref 10–15)
HCT VFR BLD AUTO: 40.2 % (ref 35–47)
HGB BLD-MCNC: 11.8 G/DL (ref 11.7–15.7)
IMM GRANULOCYTES # BLD: 0 10E3/UL
IMM GRANULOCYTES NFR BLD: 0 %
LYMPHOCYTES # BLD AUTO: 1.5 10E3/UL (ref 0.8–5.3)
LYMPHOCYTES NFR BLD AUTO: 27 %
MCH RBC QN AUTO: 26.9 PG (ref 26.5–33)
MCHC RBC AUTO-ENTMCNC: 29.4 G/DL (ref 31.5–36.5)
MCV RBC AUTO: 92 FL (ref 78–100)
MONOCYTES # BLD AUTO: 0.6 10E3/UL (ref 0–1.3)
MONOCYTES NFR BLD AUTO: 10 %
NEUTROPHILS # BLD AUTO: 3.5 10E3/UL (ref 1.6–8.3)
NEUTROPHILS NFR BLD AUTO: 63 %
PLATELET # BLD AUTO: 309 10E3/UL (ref 150–450)
RBC # BLD AUTO: 4.38 10E6/UL (ref 3.8–5.2)
WBC # BLD AUTO: 5.6 10E3/UL (ref 4–11)

## 2023-11-14 PROCEDURE — 80053 COMPREHEN METABOLIC PANEL: CPT | Performed by: FAMILY MEDICINE

## 2023-11-14 PROCEDURE — 99214 OFFICE O/P EST MOD 30 MIN: CPT | Mod: 25 | Performed by: FAMILY MEDICINE

## 2023-11-14 PROCEDURE — 85025 COMPLETE CBC W/AUTO DIFF WBC: CPT | Performed by: FAMILY MEDICINE

## 2023-11-14 PROCEDURE — 90480 ADMN SARSCOV2 VAC 1/ONLY CMP: CPT | Performed by: FAMILY MEDICINE

## 2023-11-14 PROCEDURE — 91320 SARSCV2 VAC 30MCG TRS-SUC IM: CPT | Performed by: FAMILY MEDICINE

## 2023-11-14 PROCEDURE — 36415 COLL VENOUS BLD VENIPUNCTURE: CPT | Performed by: FAMILY MEDICINE

## 2023-11-14 RX ORDER — FOLIC ACID 1 MG/1
1 TABLET ORAL DAILY
Qty: 90 TABLET | Refills: 0 | Status: SHIPPED | OUTPATIENT
Start: 2023-11-14 | End: 2024-01-15

## 2023-11-14 RX ORDER — FERROUS SULFATE 325(65) MG
325 TABLET ORAL DAILY
Qty: 90 TABLET | Refills: 0 | Status: SHIPPED | OUTPATIENT
Start: 2023-11-14

## 2023-11-14 RX ORDER — POLYETHYLENE GLYCOL 3350 17 G/17G
17 POWDER, FOR SOLUTION ORAL DAILY PRN
Qty: 510 G | Refills: 3 | Status: SHIPPED | OUTPATIENT
Start: 2023-11-14

## 2023-11-14 RX ORDER — PANTOPRAZOLE SODIUM 40 MG/1
40 TABLET, DELAYED RELEASE ORAL
Qty: 180 TABLET | Refills: 3 | Status: SHIPPED | OUTPATIENT
Start: 2023-11-14 | End: 2024-09-13

## 2023-11-14 RX ORDER — AMOXAPINE 50 MG/1
50 TABLET ORAL AT BEDTIME
Qty: 90 TABLET | Refills: 3 | Status: SHIPPED | OUTPATIENT
Start: 2023-11-14

## 2023-11-14 RX ORDER — ESCITALOPRAM OXALATE 20 MG/1
20 TABLET ORAL DAILY
Qty: 90 TABLET | Refills: 3 | Status: SHIPPED | OUTPATIENT
Start: 2023-11-14

## 2023-11-14 RX ORDER — ATENOLOL 100 MG/1
100 TABLET ORAL DAILY
Qty: 90 TABLET | Refills: 3 | Status: SHIPPED | OUTPATIENT
Start: 2023-11-14 | End: 2024-09-13

## 2023-11-14 RX ORDER — LEVOTHYROXINE SODIUM 112 UG/1
112 TABLET ORAL DAILY
Qty: 90 TABLET | Refills: 3 | Status: SHIPPED | OUTPATIENT
Start: 2023-11-14

## 2023-11-14 RX ORDER — POTASSIUM CHLORIDE 750 MG/1
10 TABLET, EXTENDED RELEASE ORAL DAILY
Qty: 90 TABLET | Refills: 1 | Status: SHIPPED | OUTPATIENT
Start: 2023-11-14 | End: 2024-04-22

## 2023-11-14 RX ORDER — LISINOPRIL 20 MG/1
20 TABLET ORAL DAILY
Qty: 90 TABLET | Refills: 3 | Status: SHIPPED | OUTPATIENT
Start: 2023-11-14 | End: 2023-12-12

## 2023-11-14 ASSESSMENT — PATIENT HEALTH QUESTIONNAIRE - PHQ9
10. IF YOU CHECKED OFF ANY PROBLEMS, HOW DIFFICULT HAVE THESE PROBLEMS MADE IT FOR YOU TO DO YOUR WORK, TAKE CARE OF THINGS AT HOME, OR GET ALONG WITH OTHER PEOPLE: NOT DIFFICULT AT ALL
SUM OF ALL RESPONSES TO PHQ QUESTIONS 1-9: 0
SUM OF ALL RESPONSES TO PHQ QUESTIONS 1-9: 0

## 2023-11-14 NOTE — LETTER
November 15, 2023      Mary Palafox  [address] [place] [address]        Dear ,    We are writing to inform you of your test results.    Your labs look (surprisingly) good!    Resulted Orders   Comprehensive metabolic panel (BMP + Alb, Alk Phos, ALT, AST, Total. Bili, TP)   Result Value Ref Range    Sodium 142 135 - 145 mmol/L      Comment:      Reference intervals for this test were updated on 09/26/2023 to more accurately reflect our healthy population. There may be differences in the flagging of prior results with similar values performed with this method. Interpretation of those prior results can be made in the context of the updated reference intervals.     Potassium 4.5 3.4 - 5.3 mmol/L    Carbon Dioxide (CO2) 24 22 - 29 mmol/L    Anion Gap 10 7 - 15 mmol/L    Urea Nitrogen 12.4 8.0 - 23.0 mg/dL    Creatinine 0.71 0.51 - 0.95 mg/dL    GFR Estimate >90 >60 mL/min/1.73m2    Calcium 9.6 8.8 - 10.2 mg/dL    Chloride 108 (H) 98 - 107 mmol/L    Glucose 97 70 - 99 mg/dL    Alkaline Phosphatase 94 40 - 150 U/L      Comment:      Reference intervals for this test were updated on 11/14/2023 to more accurately reflect our healthy population. There may be differences in the flagging of prior results with similar values performed with this method. Interpretation of those prior results can be made in the context of the updated reference intervals.    AST 16 0 - 45 U/L      Comment:      Reference intervals for this test were updated on 6/12/2023 to more accurately reflect our healthy population. There may be differences in the flagging of prior results with similar values performed with this method. Interpretation of those prior results can be made in the context of the updated reference intervals.    ALT 10 0 - 50 U/L      Comment:      Reference intervals for this test were updated on 6/12/2023 to more accurately reflect our healthy population. There may be differences in the flagging of prior results  with similar values performed with this method. Interpretation of those prior results can be made in the context of the updated reference intervals.      Protein Total 6.9 6.4 - 8.3 g/dL    Albumin 4.0 3.5 - 5.2 g/dL    Bilirubin Total 0.2 <=1.2 mg/dL   CBC with platelets and differential   Result Value Ref Range    WBC Count 5.6 4.0 - 11.0 10e3/uL    RBC Count 4.38 3.80 - 5.20 10e6/uL    Hemoglobin 11.8 11.7 - 15.7 g/dL    Hematocrit 40.2 35.0 - 47.0 %    MCV 92 78 - 100 fL    MCH 26.9 26.5 - 33.0 pg    MCHC 29.4 (L) 31.5 - 36.5 g/dL    RDW 18.4 (H) 10.0 - 15.0 %    Platelet Count 309 150 - 450 10e3/uL    % Neutrophils 63 %    % Lymphocytes 27 %    % Monocytes 10 %    % Eosinophils 0 %    % Basophils 0 %    % Immature Granulocytes 0 %    Absolute Neutrophils 3.5 1.6 - 8.3 10e3/uL    Absolute Lymphocytes 1.5 0.8 - 5.3 10e3/uL    Absolute Monocytes 0.6 0.0 - 1.3 10e3/uL    Absolute Eosinophils 0.0 0.0 - 0.7 10e3/uL    Absolute Basophils 0.0 0.0 - 0.2 10e3/uL    Absolute Immature Granulocytes 0.0 <=0.4 10e3/uL       If you have any questions or concerns, please call the clinic at the number listed above.       Sincerely,      Abby Joshi MD

## 2023-11-14 NOTE — PROGRESS NOTES
1. Hospital discharge follow-up  2. Acute gastritis with hemorrhage, unspecified gastritis type  This is a 67 yo female who presented to the hospital with sepsis and GI bleeding.    I have reviewed available hospital records, consults, notes, discharge summary as well as imaging and lab results.  In addition I have reviewed her medication list and made any adjustments as indicated in orders.      Reviewed medications - prescriptions sent.  Labs ordered - will follow up.    - polyethylene glycol (MIRALAX) 17 GM/Dose powder; Take 17 g by mouth daily as needed for constipation  Dispense: 510 g; Refill: 3  - pantoprazole (PROTONIX) 40 MG EC tablet; Take 1 tablet (40 mg) by mouth 2 times daily (before meals)  Dispense: 180 tablet; Refill: 3  - CBC with Platelets & Differential; Future  - Comprehensive metabolic panel (BMP + Alb, Alk Phos, ALT, AST, Total. Bili, TP); Future  - PRIMARY CARE FOLLOW-UP SCHEDULING; Future  - CBC with Platelets & Differential  - Comprehensive metabolic panel (BMP + Alb, Alk Phos, ALT, AST, Total. Bili, TP)    3. Anemia, unspecified type  Anemia - mixed.  Related to bleeding.    - ferrous sulfate (FEROSUL) 325 (65 Fe) MG tablet; Take 1 tablet (325 mg) by mouth daily  Dispense: 90 tablet; Refill: 0  - folic acid (FOLVITE) 1 MG tablet; Take 1 tablet (1 mg) by mouth daily  Dispense: 90 tablet; Refill: 0  - CBC with Platelets & Differential; Future  - CBC with Platelets & Differential    4. Screen for colon cancer  Due for colon cancer screening - patient decliens - given recent bleeding, it would be reasonable to check LGI bleeding.      5. Visit for screening mammogram  Due for breast cancer screening - declines mammogram     6. Essential hypertension  Blood pressure is still elevated - discussed - will increase Atenolol to 100 mg daily.  Continue Lisinopril.  Will check labs -  - atenolol (TENORMIN) 100 MG tablet; Take 1 tablet (100 mg) by mouth daily  Dispense: 90 tablet; Refill: 3  - potassium  chloride ER (KLOR-CON M) 10 MEQ CR tablet; Take 1 tablet (10 mEq) by mouth daily  Dispense: 90 tablet; Refill: 1  - Comprehensive metabolic panel (BMP + Alb, Alk Phos, ALT, AST, Total. Bili, TP); Future  - lisinopril (ZESTRIL) 20 MG tablet; Take 1 tablet (20 mg) by mouth daily  Dispense: 90 tablet; Refill: 3  - PRIMARY CARE FOLLOW-UP SCHEDULING; Future  - Comprehensive metabolic panel (BMP + Alb, Alk Phos, ALT, AST, Total. Bili, TP)    7. Need for COVID-19 vaccine  Due for COVID-19 vaccine - discussed - ordered   - COVID-19 12+ (2023-24) (PFIZER)    8. Moderate episode of recurrent major depressive disorder (H)  Depression - generally improved currently - continue current medications -   - amoxapine (ASENDIN) 50 MG tablet; Take 1 tablet (50 mg) by mouth at bedtime  Dispense: 90 tablet; Refill: 3  - escitalopram (LEXAPRO) 20 MG tablet; Take 1 tablet (20 mg) by mouth daily  Dispense: 90 tablet; Refill: 3  - tiZANidine (ZANAFLEX) 4 MG tablet; Take 1 tablet (4 mg) by mouth 2 times daily as needed for muscle spasms (headache)  Dispense: 180 tablet; Refill: 0  - PRIMARY CARE FOLLOW-UP SCHEDULING; Future    9. Hypothyroidism, unspecified type  Low thyroid - on Levothyroxine -   - levothyroxine (SYNTHROID/LEVOTHROID) 112 MCG tablet; Take 1 tablet (112 mcg) by mouth daily  Dispense: 90 tablet; Refill: 3      Subjective   Mary is a 68 year old, presenting for the following health issues:  Hospital F/U        11/14/2023     3:16 PM   Additional Questions   Roomed by Ananya   Accompanied by Nayeli       Had 2 weeks of cold symptoms -   Not taking anything for her cold    10/14 - 10/24  Went to [facility]   Home on 11/5  (Sunday) -   Cat scratch on right face - happened day she got home    Amoxapine - 50 mg at bedtime   Atenolol 100 mg daily   Escitalopram 20 mg daily   Ferrous sulfate 325 daily   Folic acid 1 mg daily   Levothyroxine 112 mcg daily   Ondansetron 4 mg q 8 hours prn  Pantoprazole 40 mg BID  Potassium  "chloride 10 meq daily  Tizanidine 4 mg BID prn                 Review of Systems   Constitutional:  Positive for fatigue and unexpected weight change. Negative for chills and fever.   HENT:  Negative for congestion, ear pain and sore throat.    Eyes:  Negative for pain and visual disturbance.   Respiratory:  Negative for cough and shortness of breath.    Cardiovascular:  Negative for chest pain, palpitations and peripheral edema.   Gastrointestinal:  Negative for abdominal pain, constipation, diarrhea and hematochezia.   Endocrine: Negative for polyuria.   Genitourinary:  Negative for dysuria, frequency and vaginal discharge.   Musculoskeletal:  Negative for arthralgias and myalgias.   Skin:  Negative for rash.   Allergic/Immunologic: Negative.    Neurological:  Negative for dizziness, weakness, headaches and paresthesias.   Hematological:  Does not bruise/bleed easily.   Psychiatric/Behavioral: Negative.  Positive for mood changes.    All other systems reviewed and are negative.           Objective    BP (!) 144/98 (BP Location: Right arm, Patient Position: Sitting, Cuff Size: Adult Small)   Pulse 81   Temp 97.2  F (36.2  C) (Temporal)   Resp 20   Ht 1.57 m (5' 1.81\")   Wt 46.7 kg (103 lb)   LMP  (LMP Unknown)   SpO2 95%   BMI 18.95 kg/m    Body mass index is 18.95 kg/m .  Physical Exam  Vitals reviewed.   Constitutional:       General: She is not in acute distress.     Appearance: Normal appearance. She is ill-appearing.   HENT:      Head: Normocephalic.      Right Ear: Tympanic membrane, ear canal and external ear normal.      Left Ear: Tympanic membrane, ear canal and external ear normal.      Nose: Nose normal.      Mouth/Throat:      Mouth: Mucous membranes are moist.      Pharynx: No posterior oropharyngeal erythema.   Eyes:      Extraocular Movements: Extraocular movements intact.      Conjunctiva/sclera: Conjunctivae normal.      Pupils: Pupils are equal, round, and reactive to light. "   Cardiovascular:      Rate and Rhythm: Normal rate and regular rhythm.      Pulses: Normal pulses.      Heart sounds: Normal heart sounds. No murmur heard.  Pulmonary:      Effort: Pulmonary effort is normal.      Breath sounds: Normal breath sounds.   Abdominal:      Palpations: Abdomen is soft. There is no mass.      Tenderness: There is no abdominal tenderness. There is no guarding or rebound.   Musculoskeletal:         General: No deformity. Normal range of motion.      Cervical back: Normal range of motion and neck supple.   Lymphadenopathy:      Cervical: No cervical adenopathy.   Skin:     General: Skin is warm and dry.   Neurological:      General: No focal deficit present.      Mental Status: She is alert.   Psychiatric:         Mood and Affect: Mood normal.         Behavior: Behavior normal.            Results for orders placed or performed in visit on 11/14/23   Comprehensive metabolic panel (BMP + Alb, Alk Phos, ALT, AST, Total. Bili, TP)     Status: Abnormal   Result Value Ref Range    Sodium 142 135 - 145 mmol/L    Potassium 4.5 3.4 - 5.3 mmol/L    Carbon Dioxide (CO2) 24 22 - 29 mmol/L    Anion Gap 10 7 - 15 mmol/L    Urea Nitrogen 12.4 8.0 - 23.0 mg/dL    Creatinine 0.71 0.51 - 0.95 mg/dL    GFR Estimate >90 >60 mL/min/1.73m2    Calcium 9.6 8.8 - 10.2 mg/dL    Chloride 108 (H) 98 - 107 mmol/L    Glucose 97 70 - 99 mg/dL    Alkaline Phosphatase 94 40 - 150 U/L    AST 16 0 - 45 U/L    ALT 10 0 - 50 U/L    Protein Total 6.9 6.4 - 8.3 g/dL    Albumin 4.0 3.5 - 5.2 g/dL    Bilirubin Total 0.2 <=1.2 mg/dL   CBC with platelets and differential     Status: Abnormal   Result Value Ref Range    WBC Count 5.6 4.0 - 11.0 10e3/uL    RBC Count 4.38 3.80 - 5.20 10e6/uL    Hemoglobin 11.8 11.7 - 15.7 g/dL    Hematocrit 40.2 35.0 - 47.0 %    MCV 92 78 - 100 fL    MCH 26.9 26.5 - 33.0 pg    MCHC 29.4 (L) 31.5 - 36.5 g/dL    RDW 18.4 (H) 10.0 - 15.0 %    Platelet Count 309 150 - 450 10e3/uL    % Neutrophils 63 %    %  Lymphocytes 27 %    % Monocytes 10 %    % Eosinophils 0 %    % Basophils 0 %    % Immature Granulocytes 0 %    Absolute Neutrophils 3.5 1.6 - 8.3 10e3/uL    Absolute Lymphocytes 1.5 0.8 - 5.3 10e3/uL    Absolute Monocytes 0.6 0.0 - 1.3 10e3/uL    Absolute Eosinophils 0.0 0.0 - 0.7 10e3/uL    Absolute Basophils 0.0 0.0 - 0.2 10e3/uL    Absolute Immature Granulocytes 0.0 <=0.4 10e3/uL   CBC with Platelets & Differential     Status: Abnormal    Narrative    The following orders were created for panel order CBC with Platelets & Differential.  Procedure                               Abnormality         Status                     ---------                               -----------         ------                     CBC with platelets and d...[[id number]]  Abnormal            Final result                 Please view results for these tests on the individual orders.             Prior to immunization administration, verified patients identity using patient s name and date of birth. Please see Immunization Activity for additional information.     Screening Questionnaire for Adult Immunization    Are you sick today?   No   Do you have allergies to medications, food, a vaccine component or latex?   No   Have you ever had a serious reaction after receiving a vaccination?   No   Do you have a long-term health problem with heart, lung, kidney, or metabolic disease (e.g., diabetes), asthma, a blood disorder, no spleen, complement component deficiency, a cochlear implant, or a spinal fluid leak?  Are you on long-term aspirin therapy?   No   Do you have cancer, leukemia, HIV/AIDS, or any other immune system problem?   No   Do you have a parent, brother, or sister with an immune system problem?   No   In the past 3 months, have you taken medications that affect  your immune system, such as prednisone, other steroids, or anticancer drugs; drugs for the treatment of rheumatoid arthritis, Crohn s disease, or psoriasis; or have you had  radiation treatments?   No   Have you had a seizure, or a brain or other nervous system problem?   No   During the past year, have you received a transfusion of blood or blood    products, or been given immune (gamma) globulin or antiviral drug?   Don't Know   For women: Are you pregnant or is there a chance you could become       pregnant during the next month?   No   Have you received any vaccinations in the past 4 weeks?   No     Immunization questionnaire answers were all negative.      Patient instructed to remain in clinic for 15 minutes afterwards, and to report any adverse reactions.     Screening performed by Ananya Morales CMA on 11/14/2023 at 3:21 PM.            Answers submitted by the patient for this visit:  Patient Health Questionnaire (Submitted on 11/14/2023)  If you checked off any problems, how difficult have these problems made it for you to do your work, take care of things at home, or get along with other people?: Not difficult at all  PHQ9 TOTAL SCORE: 0

## 2023-11-15 LAB
ALBUMIN SERPL BCG-MCNC: 4 G/DL (ref 3.5–5.2)
ALP SERPL-CCNC: 94 U/L (ref 40–150)
ALT SERPL W P-5'-P-CCNC: 10 U/L (ref 0–50)
ANION GAP SERPL CALCULATED.3IONS-SCNC: 10 MMOL/L (ref 7–15)
AST SERPL W P-5'-P-CCNC: 16 U/L (ref 0–45)
BILIRUB SERPL-MCNC: 0.2 MG/DL
BUN SERPL-MCNC: 12.4 MG/DL (ref 8–23)
CALCIUM SERPL-MCNC: 9.6 MG/DL (ref 8.8–10.2)
CHLORIDE SERPL-SCNC: 108 MMOL/L (ref 98–107)
CREAT SERPL-MCNC: 0.71 MG/DL (ref 0.51–0.95)
DEPRECATED HCO3 PLAS-SCNC: 24 MMOL/L (ref 22–29)
EGFRCR SERPLBLD CKD-EPI 2021: >90 ML/MIN/1.73M2
GLUCOSE SERPL-MCNC: 97 MG/DL (ref 70–99)
POTASSIUM SERPL-SCNC: 4.5 MMOL/L (ref 3.4–5.3)
PROT SERPL-MCNC: 6.9 G/DL (ref 6.4–8.3)
SODIUM SERPL-SCNC: 142 MMOL/L (ref 135–145)

## 2023-11-16 ENCOUNTER — PATIENT OUTREACH (OUTPATIENT)
Dept: NURSING | Facility: CLINIC | Age: 68
End: 2023-11-16
Payer: COMMERCIAL

## 2023-11-16 ASSESSMENT — ACTIVITIES OF DAILY LIVING (ADL): DEPENDENT_IADLS:: CLEANING;LAUNDRY;SHOPPING

## 2023-11-16 NOTE — PROGRESS NOTES
"        Clinic Care Coordination Contact  Clinic Care Coordination Contact  OUTREACH    Referral Information:  Referral Source: Health Plan    Primary Diagnosis: SIRS/Sepsis    Chief Complaint   Patient presents with    Clinic Care Coordination - Initial        Universal Utilization:   Clinic Utilization  Difficulty keeping appointments:: No  Compliance Concerns: No  No-Show Concerns: No  No PCP office visit in Past Year: No  Utilization      No Show Count (past year)  1             ED Visits  1             Hospital Admissions  1                    Current as of: 11/15/2023 11:14 PM                Clinical Concerns:RN CC spoke with patient following referral from CPN post discharge    Patient attended visit with PCP, shared that was  very productive visit and a lot   Reviewed recommendations and reminded pt of upcoming follow up in December    Pt notes that her legs have gotten stronger and that her family have noticed she is not \"leaning \" on them as much to walk, has decided not to do additional PT/OT at this time. Will discuss with PCP at visit if concern    Patient updated consent to communicate for grandson and sister as  and daughter have passed away and were her listed emergency contact       Current Medical Concerns:    Patient Active Problem List   Diagnosis    Viral Syndrome    Acute Bronchitis    Muscle Aches, Generalized (Myalgias)    Pain During Urination (Dysuria)    Closed Fracture Of The Distal End Of The Radius    Acute Sinusitis    Allergies    Abnormal Weight Loss    Hypothyroidism    Migraine Headache    Impingement Of The Right Shoulder    Hypertension    Internal Derangement Of Left Knee    Syncope    Moderate episode of recurrent major depressive disorder (H)    Periumbilical abdominal pain    Anemia, unspecified type   Medication Management:  Medication review status: Medications reviewed and no changes reported per patient.        Pt notes she has been taking potasium and will follow " up to discuss with PCP at visit      Functional Status:  Dependent ADLs:: Independent  Dependent IADLs:: Cleaning, Laundry, Shopping  Bed or wheelchair confined:: No  Mobility Status: Independent    Living Situation:  Current living arrangement:: I live in a private home with family  Type of residence:: Private home - stairs    Lifestyle & Psychosocial Needs:    Social Determinants of Health     Food Insecurity: Low Risk  (11/14/2023)    Food Insecurity     Within the past 12 months, did you worry that your food would run out before you got money to buy more?: No     Within the past 12 months, did the food you bought just not last and you didn t have money to get more?: No   Depression: Not at risk (11/14/2023)    PHQ-2     PHQ-2 Score: 0   Housing Stability: Low Risk  (11/14/2023)    Housing Stability     Do you have housing? : Yes     Are you worried about losing your housing?: No   Tobacco Use: High Risk (11/14/2023)    Patient History     Smoking Tobacco Use: Every Day     Smokeless Tobacco Use: Former     Passive Exposure: Not on file   Financial Resource Strain: Low Risk  (11/14/2023)    Financial Resource Strain     Within the past 12 months, have you or your family members you live with been unable to get utilities (heat, electricity) when it was really needed?: No   Alcohol Use: Not on file   Transportation Needs: Low Risk  (11/14/2023)    Transportation Needs     Within the past 12 months, has lack of transportation kept you from medical appointments, getting your medicines, non-medical meetings or appointments, work, or from getting things that you need?: No   Physical Activity: Not on file   Interpersonal Safety: Not on file   Stress: Not on file   Social Connections: Not on file     Inadequate nutrition (GOAL):: No  Tube Feeding: No  Inadequate activity/exercise (GOAL):: No  Significant changes in sleep pattern (GOAL): No  Transportation means:: Regular car     Muslim or spiritual beliefs that impact  treatment:: No  Mental health DX:: Yes  Mental health DX how managed:: Medication  Informal Support system:: Family (grandson)             Resources and Interventions:  Current Resources:      Community Resources: None  Supplies Currently Used at Home: None  Equipment Currently Used at Home: none (Patient has walker and cane available if needed)  Employment Status: retired ([employer])         Advance Care Plan/Directive  Advanced Care Plans/Directives on file:: No    Referrals Placed: None   Patient/Caregiver understanding: Pt reports understanding and denies any additional questions or concerns at this times. RNCC engaged in [employer] communication during encounter.         Future Appointments                In 3 weeks Abby Joshi MD [facility]            Plan: Patient will schedule and attend recommended follow up visits with speciality providers and primary care provider.  RN CC will be available in future if needed - no future outreach scheduled at this time

## 2023-11-19 ASSESSMENT — ENCOUNTER SYMPTOMS
MYALGIAS: 0
PALPITATIONS: 0
DIARRHEA: 0
FEVER: 0
HEMATOCHEZIA: 0
FATIGUE: 1
COUGH: 0
SORE THROAT: 0
BRUISES/BLEEDS EASILY: 0
UNEXPECTED WEIGHT CHANGE: 1
CONSTIPATION: 0
ARTHRALGIAS: 0
ABDOMINAL PAIN: 0
FREQUENCY: 0
PARESTHESIAS: 0
PSYCHIATRIC NEGATIVE: 1
DIZZINESS: 0
DYSURIA: 0
EYE PAIN: 0
SHORTNESS OF BREATH: 0
WEAKNESS: 0
CHILLS: 0
ALLERGIC/IMMUNOLOGIC NEGATIVE: 1
HEADACHES: 0

## 2023-12-12 ENCOUNTER — OFFICE VISIT (OUTPATIENT)
Dept: FAMILY MEDICINE | Facility: CLINIC | Age: 68
End: 2023-12-12
Attending: FAMILY MEDICINE
Payer: COMMERCIAL

## 2023-12-12 VITALS
BODY MASS INDEX: 19.69 KG/M2 | HEIGHT: 62 IN | WEIGHT: 107 LBS | OXYGEN SATURATION: 97 % | HEART RATE: 73 BPM | TEMPERATURE: 97.4 F | DIASTOLIC BLOOD PRESSURE: 92 MMHG | SYSTOLIC BLOOD PRESSURE: 154 MMHG | RESPIRATION RATE: 18 BRPM

## 2023-12-12 DIAGNOSIS — G43.709 CHRONIC MIGRAINE WITHOUT AURA WITHOUT STATUS MIGRAINOSUS, NOT INTRACTABLE: ICD-10-CM

## 2023-12-12 DIAGNOSIS — Z12.31 VISIT FOR SCREENING MAMMOGRAM: ICD-10-CM

## 2023-12-12 DIAGNOSIS — Z13.9 ENCOUNTER FOR SCREENING INVOLVING SOCIAL DETERMINANTS OF HEALTH (SDOH): ICD-10-CM

## 2023-12-12 DIAGNOSIS — I10 ESSENTIAL HYPERTENSION: Primary | ICD-10-CM

## 2023-12-12 DIAGNOSIS — F33.1 MODERATE EPISODE OF RECURRENT MAJOR DEPRESSIVE DISORDER (H): ICD-10-CM

## 2023-12-12 DIAGNOSIS — K29.01 ACUTE GASTRITIS WITH HEMORRHAGE, UNSPECIFIED GASTRITIS TYPE: ICD-10-CM

## 2023-12-12 DIAGNOSIS — Z12.11 SCREEN FOR COLON CANCER: ICD-10-CM

## 2023-12-12 LAB
ERYTHROCYTE [DISTWIDTH] IN BLOOD BY AUTOMATED COUNT: 16.2 % (ref 10–15)
HCT VFR BLD AUTO: 38.2 % (ref 35–47)
HGB BLD-MCNC: 11.6 G/DL (ref 11.7–15.7)
MCH RBC QN AUTO: 27 PG (ref 26.5–33)
MCHC RBC AUTO-ENTMCNC: 30.4 G/DL (ref 31.5–36.5)
MCV RBC AUTO: 89 FL (ref 78–100)
PLATELET # BLD AUTO: 272 10E3/UL (ref 150–450)
RBC # BLD AUTO: 4.29 10E6/UL (ref 3.8–5.2)
WBC # BLD AUTO: 9.5 10E3/UL (ref 4–11)

## 2023-12-12 PROCEDURE — 99214 OFFICE O/P EST MOD 30 MIN: CPT | Performed by: FAMILY MEDICINE

## 2023-12-12 PROCEDURE — 36415 COLL VENOUS BLD VENIPUNCTURE: CPT | Performed by: FAMILY MEDICINE

## 2023-12-12 PROCEDURE — 80053 COMPREHEN METABOLIC PANEL: CPT | Performed by: FAMILY MEDICINE

## 2023-12-12 PROCEDURE — 85027 COMPLETE CBC AUTOMATED: CPT | Performed by: FAMILY MEDICINE

## 2023-12-12 RX ORDER — SUMATRIPTAN 25 MG/1
25 TABLET, FILM COATED ORAL
Qty: 20 TABLET | Refills: 3 | Status: SHIPPED | OUTPATIENT
Start: 2023-12-12 | End: 2024-07-16

## 2023-12-12 RX ORDER — LISINOPRIL 20 MG/1
20 TABLET ORAL 2 TIMES DAILY
Qty: 180 TABLET | Refills: 3 | Status: SHIPPED | OUTPATIENT
Start: 2023-12-12 | End: 2024-09-03

## 2023-12-12 ASSESSMENT — ENCOUNTER SYMPTOMS
DIZZINESS: 0
MYALGIAS: 0
DIARRHEA: 0
APPETITE CHANGE: 1
COUGH: 0
SORE THROAT: 0
ARTHRALGIAS: 0
SHORTNESS OF BREATH: 0
WEAKNESS: 1
ALLERGIC/IMMUNOLOGIC NEGATIVE: 1
CONSTIPATION: 0
DYSURIA: 0
ABDOMINAL PAIN: 0
EYE PAIN: 0
PARESTHESIAS: 0
PALPITATIONS: 0
BRUISES/BLEEDS EASILY: 0
FREQUENCY: 0
FEVER: 0
HEADACHES: 0
PSYCHIATRIC NEGATIVE: 1
FATIGUE: 1
CHILLS: 0

## 2023-12-12 NOTE — PROGRESS NOTES
1. Essential hypertension  This is a 67 yo female with elevated blood pressures - had difficulty in past trying to control her pressures ; was recently hospitalized for severe sepsis and obviously antihypertensive medications were not necessary (and were discontinued).  Came in today with elevated BP - already back on Atenolol 100 mg daily and Lisinopril 20 mg daily.  Will increase her Lisinopril back to 20 mg BID (pre-hospital dosing).  Recheck in 4 weeks.  Will check labs today.    - PRIMARY CARE FOLLOW-UP SCHEDULING  - lisinopril (ZESTRIL) 20 MG tablet; Take 1 tablet (20 mg) by mouth 2 times daily  Dispense: 180 tablet; Refill: 3  - Comprehensive metabolic panel (BMP + Alb, Alk Phos, ALT, AST, Total. Bili, TP); Future  - Comprehensive metabolic panel (BMP + Alb, Alk Phos, ALT, AST, Total. Bili, TP)    2. Acute gastritis with hemorrhage, unspecified gastritis type  H/o acute gastritis - GI bleeding ;  will check Cbc; no evidence of pain/bleeding currently.    - PRIMARY CARE FOLLOW-UP SCHEDULING  - CBC with platelets; Future  - CBC with platelets    3. Moderate episode of recurrent major depressive disorder (H)  Patient has long history of depression symptoms -       1/28/2022     7:20 AM 5/31/2023     6:53 AM 11/14/2023     3:12 PM   PHQ   PHQ-9 Total Score 0 3 0   Q9: Thoughts of better off dead/self-harm past 2 weeks Not at all Not at all Not at all     Denies any current issues.  Living with grandson; lives next door to sister, and down the block from her niece.  This has served to be helpful/encouraging for patient.    - PRIMARY CARE FOLLOW-UP SCHEDULING    4. Chronic migraine without aura without status migrainosus, not intractable  Chronic migraines - was taking Indocin in past - nearly daily (without eating most of time).  This may be responsible for her recent Gi symptoms.  Will try Sumatriptan for pain.    - SUMAtriptan (IMITREX) 25 MG tablet; Take 1 tablet (25 mg) by mouth at onset of headache for  "migraine May repeat in 2 hours. Max 2 tablets/24 hours.  Dispense: 20 tablet; Refill: 3    5. Screen for colon cancer  Due for colon cancer screening - will follow up with GI.      6. Visit for screening mammogram  Due for breast cancer screening - declines mammogram today     7. Encounter for screening involving social determinants of health (SDoH)  Reviewed SDoH: still smoking cigarettes - not ready to quit in spite of her general ills       Subjective   Pat is a 68 year old, presenting for the following health issues:  RECHECK (Wants indomethacin refilled, this was stopped in the hospital)        12/12/2023     3:23 PM   Additional Questions   Roomed by Ananya   Accompanied by Sister and Grandson       Overall, blood pressure is high -   Took away her blood pressure meds in hospital -       History of Present Illness       Hypertension: She presents for follow up of hypertension.  She does not check blood pressure  regularly outside of the clinic. Outside blood pressures have been over 140/90. She does not follow a low salt diet.     Headaches:   Since the patient's last clinic visit, headaches are: no change  The patient is getting headaches:  ~4 times a week  She is able to do normal daily activities when she has a migraine.  The patient is taking the following rescue/relief medications:  Tylenol   Patient states \"I get only a small amount of relief\" from the rescue/relief medications.   The patient is taking the following medications to prevent migraines:  No medications to prevent migraines  In the past 4 weeks, the patient has gone to an Urgent Care or Emergency Room 0 times times due to headaches.    She eats 2-3 servings of fruits and vegetables daily.She consumes 0 sweetened beverage(s) daily.She exercises with enough effort to increase her heart rate 9 or less minutes per day.  She exercises with enough effort to increase her heart rate 3 or less days per week.   She is taking medications regularly.   " "    Review of Systems   Constitutional:  Positive for appetite change (improved, but still decreased) and fatigue. Negative for chills and fever.   HENT:  Negative for congestion, ear pain and sore throat.    Eyes:  Negative for pain and visual disturbance.   Respiratory:  Negative for cough and shortness of breath.    Cardiovascular:  Negative for chest pain, palpitations and peripheral edema.   Gastrointestinal:  Negative for abdominal pain, constipation and diarrhea.   Endocrine: Negative for polyuria.   Genitourinary:  Negative for dysuria, frequency and vaginal discharge.   Musculoskeletal:  Negative for arthralgias and myalgias.   Skin:  Negative for rash.   Allergic/Immunologic: Negative.    Neurological:  Positive for weakness (bilateral legs). Negative for dizziness, headaches and paresthesias.   Hematological:  Does not bruise/bleed easily.   Psychiatric/Behavioral: Negative.     All other systems reviewed and are negative.           Objective    BP (!) 154/92 (BP Location: Left arm, Patient Position: Sitting, Cuff Size: Adult Small)   Pulse 73   Temp 97.4  F (36.3  C) (Temporal)   Resp 18   Ht 1.57 m (5' 1.81\")   Wt 48.5 kg (107 lb)   LMP  (LMP Unknown)   SpO2 97%   BMI 19.69 kg/m    Body mass index is 19.69 kg/m .  Physical Exam  Vitals reviewed.   Constitutional:       General: She is not in acute distress.     Appearance: Normal appearance.      Comments: Looks tired   HENT:      Head: Normocephalic.      Right Ear: Tympanic membrane, ear canal and external ear normal.      Left Ear: Tympanic membrane, ear canal and external ear normal.      Nose: Nose normal.      Mouth/Throat:      Mouth: Mucous membranes are moist.      Pharynx: No posterior oropharyngeal erythema.   Eyes:      Extraocular Movements: Extraocular movements intact.      Conjunctiva/sclera: Conjunctivae normal.      Pupils: Pupils are equal, round, and reactive to light.   Cardiovascular:      Rate and Rhythm: Normal rate and " regular rhythm.      Pulses: Normal pulses.      Heart sounds: Normal heart sounds. No murmur heard.  Pulmonary:      Effort: Pulmonary effort is normal.      Breath sounds: Normal breath sounds.   Abdominal:      Palpations: Abdomen is soft. There is no mass.      Tenderness: There is no abdominal tenderness. There is no guarding or rebound.   Musculoskeletal:         General: No deformity. Normal range of motion.      Cervical back: Normal range of motion and neck supple.      Comments: Muscle atrophy - bilateral lower extremities     Lymphadenopathy:      Cervical: No cervical adenopathy.   Skin:     General: Skin is warm and dry.   Neurological:      General: No focal deficit present.      Mental Status: She is alert.      Motor: Weakness present.      Gait: Gait abnormal.   Psychiatric:         Mood and Affect: Mood normal.         Behavior: Behavior normal.            Results for orders placed or performed in visit on 12/12/23 (from the past 24 hour(s))   CBC with platelets   Result Value Ref Range    WBC Count 9.5 4.0 - 11.0 10e3/uL    RBC Count 4.29 3.80 - 5.20 10e6/uL    Hemoglobin 11.6 (L) 11.7 - 15.7 g/dL    Hematocrit 38.2 35.0 - 47.0 %    MCV 89 78 - 100 fL    MCH 27.0 26.5 - 33.0 pg    MCHC 30.4 (L) 31.5 - 36.5 g/dL    RDW 16.2 (H) 10.0 - 15.0 %    Platelet Count 272 150 - 450 10e3/uL             Prior to immunization administration, verified patients identity using patient s name and date of birth. Please see Immunization Activity for additional information.     Screening Questionnaire for Adult Immunization    Are you sick today?   No   Do you have allergies to medications, food, a vaccine component or latex?   No   Have you ever had a serious reaction after receiving a vaccination?   No   Do you have a long-term health problem with heart, lung, kidney, or metabolic disease (e.g., diabetes), asthma, a blood disorder, no spleen, complement component deficiency, a cochlear implant, or a spinal fluid  leak?  Are you on long-term aspirin therapy?   No   Do you have cancer, leukemia, HIV/AIDS, or any other immune system problem?   No   Do you have a parent, brother, or sister with an immune system problem?   No   In the past 3 months, have you taken medications that affect  your immune system, such as prednisone, other steroids, or anticancer drugs; drugs for the treatment of rheumatoid arthritis, Crohn s disease, or psoriasis; or have you had radiation treatments?   No   Have you had a seizure, or a brain or other nervous system problem?   No   During the past year, have you received a transfusion of blood or blood    products, or been given immune (gamma) globulin or antiviral drug?   Don't Know   For women: Are you pregnant or is there a chance you could become       pregnant during the next month?   No   Have you received any vaccinations in the past 4 weeks?   Yes     Immunization questionnaire was positive for at least one answer.  Notified Dr. Vaz.      Patient instructed to remain in clinic for 15 minutes afterwards, and to report any adverse reactions.     Screening performed by Aannya Morales CMA on 12/12/2023 at 3:30 PM.

## 2023-12-12 NOTE — LETTER
December 14, 2023      Mary Palafox  [address] [place] [address]        Dear ,    We are writing to inform you of your test results.    Your protein/albumin levels are low - this reflects the poor nutrition.  You need to keep eating - especially high protein foods.      As well, your creatinine level is higher than previous - this reflects your kidneys are not working as well.  I would make sure you are drinking adequate water.  Make sure to follow up in early January.  (Make an appointment).      Resulted Orders   CBC with platelets   Result Value Ref Range    WBC Count 9.5 4.0 - 11.0 10e3/uL    RBC Count 4.29 3.80 - 5.20 10e6/uL    Hemoglobin 11.6 (L) 11.7 - 15.7 g/dL    Hematocrit 38.2 35.0 - 47.0 %    MCV 89 78 - 100 fL    MCH 27.0 26.5 - 33.0 pg    MCHC 30.4 (L) 31.5 - 36.5 g/dL    RDW 16.2 (H) 10.0 - 15.0 %    Platelet Count 272 150 - 450 10e3/uL   Comprehensive metabolic panel (BMP + Alb, Alk Phos, ALT, AST, Total. Bili, TP)   Result Value Ref Range    Sodium 140 135 - 145 mmol/L      Comment:      Reference intervals for this test were updated on 09/26/2023 to more accurately reflect our healthy population. There may be differences in the flagging of prior results with similar values performed with this method. Interpretation of those prior results can be made in the context of the updated reference intervals.     Potassium 5.1 3.4 - 5.3 mmol/L    Carbon Dioxide (CO2) 25 22 - 29 mmol/L    Anion Gap 8 7 - 15 mmol/L    Urea Nitrogen 17.7 8.0 - 23.0 mg/dL    Creatinine 1.38 (H) 0.51 - 0.95 mg/dL    GFR Estimate 41 (L) >60 mL/min/1.73m2    Calcium 8.9 8.8 - 10.2 mg/dL    Chloride 107 98 - 107 mmol/L    Glucose 86 70 - 99 mg/dL    Alkaline Phosphatase 119 40 - 150 U/L      Comment:      Reference intervals for this test were updated on 11/14/2023 to more accurately reflect our healthy population. There may be differences in the flagging of prior results with similar values performed with this  method. Interpretation of those prior results can be made in the context of the updated reference intervals.    AST 14 0 - 45 U/L      Comment:      Reference intervals for this test were updated on 6/12/2023 to more accurately reflect our healthy population. There may be differences in the flagging of prior results with similar values performed with this method. Interpretation of those prior results can be made in the context of the updated reference intervals.    ALT 7 0 - 50 U/L      Comment:      Reference intervals for this test were updated on 6/12/2023 to more accurately reflect our healthy population. There may be differences in the flagging of prior results with similar values performed with this method. Interpretation of those prior results can be made in the context of the updated reference intervals.      Protein Total 5.8 (L) 6.4 - 8.3 g/dL    Albumin 3.2 (L) 3.5 - 5.2 g/dL    Bilirubin Total <0.2 <=1.2 mg/dL       If you have any questions or concerns, please call the clinic at the number listed above.       Sincerely,      Abby Joshi MD

## 2023-12-13 LAB
ALBUMIN SERPL BCG-MCNC: 3.2 G/DL (ref 3.5–5.2)
ALP SERPL-CCNC: 119 U/L (ref 40–150)
ALT SERPL W P-5'-P-CCNC: 7 U/L (ref 0–50)
ANION GAP SERPL CALCULATED.3IONS-SCNC: 8 MMOL/L (ref 7–15)
AST SERPL W P-5'-P-CCNC: 14 U/L (ref 0–45)
BILIRUB SERPL-MCNC: <0.2 MG/DL
BUN SERPL-MCNC: 17.7 MG/DL (ref 8–23)
CALCIUM SERPL-MCNC: 8.9 MG/DL (ref 8.8–10.2)
CHLORIDE SERPL-SCNC: 107 MMOL/L (ref 98–107)
CREAT SERPL-MCNC: 1.38 MG/DL (ref 0.51–0.95)
DEPRECATED HCO3 PLAS-SCNC: 25 MMOL/L (ref 22–29)
EGFRCR SERPLBLD CKD-EPI 2021: 41 ML/MIN/1.73M2
GLUCOSE SERPL-MCNC: 86 MG/DL (ref 70–99)
POTASSIUM SERPL-SCNC: 5.1 MMOL/L (ref 3.4–5.3)
PROT SERPL-MCNC: 5.8 G/DL (ref 6.4–8.3)
SODIUM SERPL-SCNC: 140 MMOL/L (ref 135–145)

## 2024-01-15 DIAGNOSIS — D64.9 ANEMIA, UNSPECIFIED TYPE: ICD-10-CM

## 2024-01-15 RX ORDER — FOLIC ACID 1 MG/1
1000 TABLET ORAL DAILY
Qty: 90 TABLET | Refills: 3 | Status: SHIPPED | OUTPATIENT
Start: 2024-01-15 | End: 2024-10-02

## 2024-02-07 DIAGNOSIS — F33.1 MODERATE EPISODE OF RECURRENT MAJOR DEPRESSIVE DISORDER (H): ICD-10-CM

## 2024-04-04 DIAGNOSIS — I10 ESSENTIAL HYPERTENSION: ICD-10-CM

## 2024-04-05 RX ORDER — POTASSIUM CHLORIDE 750 MG/1
10 TABLET, EXTENDED RELEASE ORAL DAILY
Qty: 100 TABLET | Refills: 2 | OUTPATIENT
Start: 2024-04-05

## 2024-04-20 DIAGNOSIS — I10 ESSENTIAL HYPERTENSION: ICD-10-CM

## 2024-04-22 RX ORDER — POTASSIUM CHLORIDE 750 MG/1
10 TABLET, EXTENDED RELEASE ORAL DAILY
Qty: 90 TABLET | Refills: 1 | Status: SHIPPED | OUTPATIENT
Start: 2024-04-22 | End: 2024-06-27

## 2024-05-01 ENCOUNTER — PATIENT OUTREACH (OUTPATIENT)
Dept: CARE COORDINATION | Facility: CLINIC | Age: 69
End: 2024-05-01
Payer: COMMERCIAL

## 2024-05-15 ENCOUNTER — PATIENT OUTREACH (OUTPATIENT)
Dept: CARE COORDINATION | Facility: CLINIC | Age: 69
End: 2024-05-15
Payer: COMMERCIAL

## 2024-06-27 DIAGNOSIS — I10 ESSENTIAL HYPERTENSION: ICD-10-CM

## 2024-06-27 RX ORDER — POTASSIUM CHLORIDE 750 MG/1
10 TABLET, EXTENDED RELEASE ORAL DAILY
Qty: 90 TABLET | Refills: 0 | Status: SHIPPED | OUTPATIENT
Start: 2024-06-27 | End: 2024-09-22

## 2024-07-16 DIAGNOSIS — G43.709 CHRONIC MIGRAINE WITHOUT AURA WITHOUT STATUS MIGRAINOSUS, NOT INTRACTABLE: ICD-10-CM

## 2024-07-16 DIAGNOSIS — F33.1 MODERATE EPISODE OF RECURRENT MAJOR DEPRESSIVE DISORDER (H): ICD-10-CM

## 2024-07-16 RX ORDER — SUMATRIPTAN 25 MG/1
TABLET, FILM COATED ORAL
Qty: 20 TABLET | Refills: 0 | Status: SHIPPED | OUTPATIENT
Start: 2024-07-16

## 2024-07-17 DIAGNOSIS — K29.01 ACUTE GASTRITIS WITH HEMORRHAGE, UNSPECIFIED GASTRITIS TYPE: ICD-10-CM

## 2024-07-17 DIAGNOSIS — I10 ESSENTIAL HYPERTENSION: ICD-10-CM

## 2024-07-18 RX ORDER — ATENOLOL 100 MG/1
100 TABLET ORAL DAILY
Qty: 100 TABLET | Refills: 2 | OUTPATIENT
Start: 2024-07-18

## 2024-07-18 RX ORDER — PANTOPRAZOLE SODIUM 40 MG/1
40 TABLET, DELAYED RELEASE ORAL
Qty: 200 TABLET | Refills: 2 | OUTPATIENT
Start: 2024-07-18

## 2024-07-18 NOTE — TELEPHONE ENCOUNTER
Pharmacy or patient requested refills that are already active on file. Refused request to pharmacy or patient depending on who requested.

## 2024-08-30 DIAGNOSIS — I10 ESSENTIAL HYPERTENSION: ICD-10-CM

## 2024-09-03 RX ORDER — LISINOPRIL 20 MG/1
20 TABLET ORAL 2 TIMES DAILY
Qty: 200 TABLET | Refills: 0 | Status: SHIPPED | OUTPATIENT
Start: 2024-09-03

## 2024-09-12 DIAGNOSIS — K29.01 ACUTE GASTRITIS WITH HEMORRHAGE, UNSPECIFIED GASTRITIS TYPE: ICD-10-CM

## 2024-09-12 DIAGNOSIS — I10 ESSENTIAL HYPERTENSION: ICD-10-CM

## 2024-09-13 RX ORDER — PANTOPRAZOLE SODIUM 40 MG/1
40 TABLET, DELAYED RELEASE ORAL
Qty: 120 TABLET | Refills: 0 | Status: SHIPPED | OUTPATIENT
Start: 2024-09-13

## 2024-09-13 RX ORDER — ATENOLOL 100 MG/1
100 TABLET ORAL DAILY
Qty: 60 TABLET | Refills: 3 | Status: SHIPPED | OUTPATIENT
Start: 2024-09-13

## 2024-09-21 DIAGNOSIS — I10 ESSENTIAL HYPERTENSION: ICD-10-CM

## 2024-09-21 DIAGNOSIS — F33.1 MODERATE EPISODE OF RECURRENT MAJOR DEPRESSIVE DISORDER (H): ICD-10-CM

## 2024-09-22 RX ORDER — POTASSIUM CHLORIDE 750 MG/1
10 TABLET, EXTENDED RELEASE ORAL DAILY
Qty: 90 TABLET | Refills: 0 | Status: SHIPPED | OUTPATIENT
Start: 2024-09-22

## 2024-09-22 RX ORDER — AMOXAPINE 50 MG/1
50 TABLET ORAL AT BEDTIME
Qty: 90 TABLET | Refills: 3 | OUTPATIENT
Start: 2024-09-22

## 2024-10-02 DIAGNOSIS — D64.9 ANEMIA, UNSPECIFIED TYPE: ICD-10-CM

## 2024-10-02 RX ORDER — FOLIC ACID 1 MG/1
1000 TABLET ORAL DAILY
Qty: 100 TABLET | Refills: 0 | Status: SHIPPED | OUTPATIENT
Start: 2024-10-02

## 2024-10-11 DIAGNOSIS — E03.9 HYPOTHYROIDISM, UNSPECIFIED TYPE: ICD-10-CM

## 2024-10-13 RX ORDER — LEVOTHYROXINE SODIUM 112 UG/1
112 TABLET ORAL DAILY
Qty: 90 TABLET | Refills: 0 | Status: SHIPPED | OUTPATIENT
Start: 2024-10-13

## 2024-10-26 DIAGNOSIS — K29.01 ACUTE GASTRITIS WITH HEMORRHAGE, UNSPECIFIED GASTRITIS TYPE: ICD-10-CM

## 2024-10-28 RX ORDER — PANTOPRAZOLE SODIUM 40 MG/1
40 TABLET, DELAYED RELEASE ORAL
Qty: 120 TABLET | Refills: 0 | Status: SHIPPED | OUTPATIENT
Start: 2024-10-28

## 2024-11-01 ENCOUNTER — IMMUNIZATION (OUTPATIENT)
Dept: FAMILY MEDICINE | Facility: CLINIC | Age: 69
End: 2024-11-01
Payer: COMMERCIAL

## 2024-11-01 PROCEDURE — 90480 ADMN SARSCOV2 VAC 1/ONLY CMP: CPT

## 2024-11-01 PROCEDURE — G0008 ADMIN INFLUENZA VIRUS VAC: HCPCS

## 2024-11-01 PROCEDURE — 91320 SARSCV2 VAC 30MCG TRS-SUC IM: CPT

## 2024-11-01 PROCEDURE — 90662 IIV NO PRSV INCREASED AG IM: CPT

## 2024-11-08 DIAGNOSIS — I10 ESSENTIAL HYPERTENSION: ICD-10-CM

## 2024-11-12 RX ORDER — LISINOPRIL 20 MG/1
20 TABLET ORAL 2 TIMES DAILY
Qty: 200 TABLET | Refills: 0 | Status: SHIPPED | OUTPATIENT
Start: 2024-11-12

## 2024-11-19 ENCOUNTER — OFFICE VISIT (OUTPATIENT)
Dept: FAMILY MEDICINE | Facility: CLINIC | Age: 69
End: 2024-11-19
Payer: COMMERCIAL

## 2024-11-19 VITALS
BODY MASS INDEX: 23.28 KG/M2 | HEART RATE: 67 BPM | WEIGHT: 115.5 LBS | DIASTOLIC BLOOD PRESSURE: 86 MMHG | RESPIRATION RATE: 16 BRPM | SYSTOLIC BLOOD PRESSURE: 157 MMHG | OXYGEN SATURATION: 97 % | HEIGHT: 59 IN | TEMPERATURE: 97.7 F

## 2024-11-19 DIAGNOSIS — I10 ESSENTIAL HYPERTENSION: ICD-10-CM

## 2024-11-19 DIAGNOSIS — K29.01 ACUTE GASTRITIS WITH HEMORRHAGE, UNSPECIFIED GASTRITIS TYPE: ICD-10-CM

## 2024-11-19 DIAGNOSIS — Z00.00 ENCOUNTER FOR MEDICARE ANNUAL WELLNESS EXAM: Primary | ICD-10-CM

## 2024-11-19 DIAGNOSIS — F33.1 MODERATE EPISODE OF RECURRENT MAJOR DEPRESSIVE DISORDER (H): ICD-10-CM

## 2024-11-19 DIAGNOSIS — G43.709 CHRONIC MIGRAINE WITHOUT AURA WITHOUT STATUS MIGRAINOSUS, NOT INTRACTABLE: ICD-10-CM

## 2024-11-19 DIAGNOSIS — Z23 NEED FOR TDAP VACCINATION: ICD-10-CM

## 2024-11-19 LAB
T4 FREE SERPL-MCNC: 2.12 NG/DL (ref 0.9–1.7)
TSH SERPL DL<=0.005 MIU/L-ACNC: <0.01 UIU/ML (ref 0.3–4.2)

## 2024-11-19 PROCEDURE — 84443 ASSAY THYROID STIM HORMONE: CPT | Performed by: FAMILY MEDICINE

## 2024-11-19 PROCEDURE — 84439 ASSAY OF FREE THYROXINE: CPT | Performed by: FAMILY MEDICINE

## 2024-11-19 PROCEDURE — 36415 COLL VENOUS BLD VENIPUNCTURE: CPT

## 2024-11-19 PROCEDURE — G0439 PPPS, SUBSEQ VISIT: HCPCS | Performed by: FAMILY MEDICINE

## 2024-11-19 SDOH — HEALTH STABILITY: PHYSICAL HEALTH: ON AVERAGE, HOW MANY DAYS PER WEEK DO YOU ENGAGE IN MODERATE TO STRENUOUS EXERCISE (LIKE A BRISK WALK)?: 7 DAYS

## 2024-11-19 ASSESSMENT — SOCIAL DETERMINANTS OF HEALTH (SDOH): HOW OFTEN DO YOU GET TOGETHER WITH FRIENDS OR RELATIVES?: ONCE A WEEK

## 2024-11-19 ASSESSMENT — PATIENT HEALTH QUESTIONNAIRE - PHQ9
10. IF YOU CHECKED OFF ANY PROBLEMS, HOW DIFFICULT HAVE THESE PROBLEMS MADE IT FOR YOU TO DO YOUR WORK, TAKE CARE OF THINGS AT HOME, OR GET ALONG WITH OTHER PEOPLE: SOMEWHAT DIFFICULT
SUM OF ALL RESPONSES TO PHQ QUESTIONS 1-9: 4
SUM OF ALL RESPONSES TO PHQ QUESTIONS 1-9: 4

## 2024-11-19 NOTE — PROGRESS NOTES
"Preventive Care Visit  [facility]  Nurse,    Nov 19, 2024    Subjective   Pat is a 69 year old, presenting for the following:  Wellness Visit        11/19/2024    11:12 AM   Additional Questions   Roomed by Nicole VERDUGO         RN Annual Wellness Visit     -DEXA: never done. Patient declined today.   -Colon Cancer Screening: never done. Patient declined today.   -Mammogram: patient declined. States \"they were too rough with me last time.\" Does not wish to have any more preventative screening mammograms. Last done in 2006.  -Lung Cancer Screening: Patient has not completed. Provider to complete initial risk versus benefit discussion. Smoking history updated/confirmed today.   -Immunizations: Patient is due for the following vaccines: Shingles and Tdap/Td. Advised patient to receive at a pharmacy due to Medicare insurance coverage.   -Medication refills pended for PCP  -Chronic Disease follow up: advised to follow up with PCP for hypertension. Patient agreeable.       Patient has been advised of split billing requirements and indicates understanding: N/A     Health Care Directive  Patient does not have a Health Care Directive: Discussed advance care planning with patient; however, patient declined at this time.      11/19/2024   General Health   How would you rate your overall physical health? Good   Feel stress (tense, anxious, or unable to sleep) Only a little             11/19/2024   Nutrition   Diet: Regular (no restrictions)            11/19/2024   Exercise   Days per week of moderate/strenous exercise 7 days              11/19/2024   Social Factors   Frequency of gathering with friends or relatives Once a week   Worry food won't last until get money to buy more No   Food not last or not have enough money for food? No   Do you have housing? (Housing is defined as stable permanent housing and does not include staying ouside in a car, in a tent, in an abandoned building, in an overnight " shelter, or couch-surfing.) Yes   Are you worried about losing your housing? No   Lack of transportation? No   Unable to get utilities (heat,electricity)? No              11/19/2024   Fall Risk   Fallen 2 or more times in the past year? No    Trouble with walking or balance? Yes    Gait Speed Test (Document in seconds) 4.5   Gait Speed Test Interpretation Less than or equal to 5.00 seconds - PASS       Patient-reported          11/19/2024   Activities of Daily Living- Home Safety   Needs help with the following daily activites None of the above   Safety concerns in the home None of the above            11/19/2024   Dental   Dentist two times every year? (!) NO            11/19/2024   Hearing Screening   Hearing concerns? None of the above            11/19/2024   Driving Risk Screening   Patient/family members have concerns about driving No            11/19/2024   General Alertness/Fatigue Screening   Have you been more tired than usual lately? (!) YES              11/19/2024   Urinary Incontinence Screening   Bothered by leaking urine in past 6 months No            11/19/2024   TB Screening   Were you born outside of the [place]? Yes          Social History     Tobacco Use    Smoking status: Every Day     Current packs/day: 0.50     Types: Cigarettes    Smokeless tobacco: Former       Today's PHQ-9 Score:       11/19/2024    11:00 AM   PHQ-9 SCORE   PHQ-9 Total Score MyChart 4 (Minimal depression)   PHQ-9 Total Score 4        Patient-reported         5/31/2023   LAST FHS-7 RESULTS   1st degree relative breast or ovarian cancer No    Any relative bilateral breast cancer No    Any male have breast cancer No    Any ONE woman have BOTH breast AND ovarian cancer No    Any woman with breast cancer before 50yrs No    2 or more relatives with breast AND/OR ovarian cancer No    2 or more relatives with breast AND/OR bowel cancer No        Patient-reported       Mammogram screening: patient declined.        ASCVD Risk    The 10-year ASCVD risk score (Kyle MEZA, et al., 2019) is: 28.8%    Values used to calculate the score:      Age: 69 years      Sex: Female      Is Non- : No      Diabetic: No      Tobacco smoker: Yes      Systolic Blood Pressure: 157 mmHg      Is BP treated: Yes      HDL Cholesterol: 43 mg/dL      Total Cholesterol: 178 mg/dL        Reviewed and updated as needed this visit by Provider      Current providers sharing in care for this patient include:  Patient Care Team:  Abby Joshi MD as PCP - General (Family Practice)  Abby Joshi MD as Assigned PCP    The following health maintenance items are reviewed in Epic and correct as of today:  Health Maintenance   Topic Date Due    DEPRESSION ACTION PLAN  Never done    LUNG CANCER SCREENING  08/22/2013    DTAP/TDAP/TD IMMUNIZATION (2 - Td or Tdap) 09/08/2019    Medicare Annual MTM Pharmacist Visit (once per calendar year)  Never done    NICOTINE/TOBACCO CESSATION COUNSELING Q 1 YR  05/31/2024    ANNUAL REVIEW OF HM ORDERS  05/31/2024    TSH W/FREE T4 REFLEX  10/22/2024    BMP  12/12/2024    COLORECTAL CANCER SCREENING  11/19/2024 (Originally 10/7/1965)    DEXA  11/19/2025 (Originally 1955)    MAMMO SCREENING  11/19/2025 (Originally 7/3/2008)    ZOSTER IMMUNIZATION (1 of 2) 11/19/2025 (Originally 10/7/2004)    PHQ-9  05/19/2025    MEDICARE ANNUAL WELLNESS VISIT  11/19/2025    FALL RISK ASSESSMENT  11/19/2025    GLUCOSE  12/12/2026    LIPID  05/31/2028    RSV VACCINE (1 - 1-dose 75+ series) 10/07/2029    ADVANCE CARE PLANNING  11/19/2029    HEPATITIS C SCREENING  Completed    INFLUENZA VACCINE  Completed    Pneumococcal Vaccine: 65+ Years  Completed    COVID-19 Vaccine  Completed    HPV IMMUNIZATION  Aged Out    MENINGITIS IMMUNIZATION  Aged Out    RSV MONOCLONAL ANTIBODY  Aged Out       Appropriate preventive services were discussed with this patient, including applicable screening as  appropriate for fall prevention, nutrition, physical activity, Tobacco-use cessation, weight loss and cognition.  Checklist reviewing preventive services available has been given to the patient.          11/19/2024   Mini Cog   Clock Draw Score 2 Normal   3 Item Recall 3 objects recalled   Mini Cog Total Score 5             Signed Electronically by: Nurse Nicole Ornelas RN BSN  [facility]

## 2024-11-19 NOTE — PATIENT INSTRUCTIONS
Patient Education   Preventive Care Advice   This is general advice given by our system to help you stay healthy. However, your care team may have specific advice just for you. Please talk to your care team about your preventive care needs.  Nutrition  Eat 5 or more servings of fruits and vegetables each day.  Try wheat bread, brown rice and whole grain pasta (instead of white bread, rice, and pasta).  Get enough calcium and vitamin D. Check the label on foods and aim for 100% of the RDA (recommended daily allowance).  Lifestyle  Exercise at least 150 minutes each week  (30 minutes a day, 5 days a week).  Do muscle strengthening activities 2 days a week. These help control your weight and prevent disease.  No smoking.  Wear sunscreen to prevent skin cancer.  Have a dental exam and cleaning every 6 months.  Yearly exams  See your health care team every year to talk about:  Any changes in your health.  Any medicines your care team has prescribed.  Preventive care, family planning, and ways to prevent chronic diseases.  Shots (vaccines)   HPV shots (up to age 26), if you've never had them before.  Hepatitis B shots (up to age 59), if you've never had them before.  COVID-19 shot: Get this shot when it's due.  Flu shot: Get a flu shot every year.  Tetanus shot: Get a tetanus shot every 10 years.  Pneumococcal, hepatitis A, and RSV shots: Ask your care team if you need these based on your risk.  Shingles shot (for age 50 and up)  General health tests  Diabetes screening:  Starting at age 35, Get screened for diabetes at least every 3 years.  If you are younger than age 35, ask your care team if you should be screened for diabetes.  Cholesterol test: At age 39, start having a cholesterol test every 5 years, or more often if advised.  Bone density scan (DEXA): At age 50, ask your care team if you should have this scan for osteoporosis (brittle bones).  Hepatitis C: Get tested at least once in your life.  STIs (sexually  transmitted infections)  Before age 24: Ask your care team if you should be screened for STIs.  After age 24: Get screened for STIs if you're at risk. You are at risk for STIs (including HIV) if:  You are sexually active with more than one person.  You don't use condoms every time.  You or a partner was diagnosed with a sexually transmitted infection.  If you are at risk for HIV, ask about PrEP medicine to prevent HIV.  Get tested for HIV at least once in your life, whether you are at risk for HIV or not.  Cancer screening tests  Cervical cancer screening: If you have a cervix, begin getting regular cervical cancer screening tests starting at age 21.  Breast cancer scan (mammogram): If you've ever had breasts, begin having regular mammograms starting at age 40. This is a scan to check for breast cancer.  Colon cancer screening: It is important to start screening for colon cancer at age 45.  Have a colonoscopy test every 10 years (or more often if you're at risk) Or, ask your provider about stool tests like a FIT test every year or Cologuard test every 3 years.  To learn more about your testing options, visit:   .  For help making a decision, visit:   https://bit.ly/xx36450.  Prostate cancer screening test: If you have a prostate, ask your care team if a prostate cancer screening test (PSA) at age 55 is right for you.  Lung cancer screening: If you are a current or former smoker ages 50 to 80, ask your care team if ongoing lung cancer screenings are right for you.  For informational purposes only. Not to replace the advice of your health care provider. Copyright   2023 [facility]. All rights reserved. Clinically reviewed by the [facility]itions [facility]. [employer] 105791 - REV 01/24.  Preventing Falls: Care Instructions  Injuries and health problems such as trouble walking or poor eyesight can increase your risk of falling. So can some medicines. But there are things you can do to help  "prevent falls. You can exercise to get stronger. You can also arrange your home to make it safer.    Talk to your doctor about the medicines you take. Ask if any of them increase the risk of falls and whether they can be changed or stopped.   Try to exercise regularly. It can help improve your strength and balance. This can help lower your risk of falling.         Practice fall safety and prevention.   Wear low-heeled shoes that fit well and give your feet good support. Talk to your doctor if you have foot problems that make this hard.  Carry a cellphone or wear a medical alert device that you can use to call for help.  Use stepladders instead of chairs to reach high objects. Don't climb if you're at risk for falls. Ask for help, if needed.  Wear the correct eyeglasses, if you need them.        Make your home safer.   Remove rugs, cords, clutter, and furniture from walkways.  Keep your house well lit. Use night-lights in hallways and bathrooms.  Install and use sturdy handrails on stairways.  Wear nonskid footwear, even inside. Don't walk barefoot or in socks without shoes.        Be safe outside.   Use handrails, curb cuts, and ramps whenever possible.  Keep your hands free by using a shoulder bag or backpack.  Try to walk in well-lit areas. Watch out for uneven ground, changes in pavement, and debris.  Be careful in the winter. Walk on the grass or gravel when sidewalks are slippery. Use de-icer on steps and walkways. Add non-slip devices to shoes.    Put grab bars and nonskid mats in your shower or tub and near the toilet. Try to use a shower chair or bath bench when bathing.   Get into a tub or shower by putting in your weaker leg first. Get out with your strong side first. Have a phone or medical alert device in the bathroom with you.   Where can you learn more?  Go to https://www.[employer].net/patiented  Enter G117 in the search box to learn more about \"Preventing Falls: Care Instructions.\"  Current as of: " July 17, 2023  Content Version: 14.2 2024 [facility].   Care instructions adapted under license by your healthcare professional. If you have questions about a medical condition or this instruction, always ask your healthcare professional. [employer] disclaims any warranty or liability for your use of this information.    Learning About Sleeping Well  What does sleeping well mean?     Sleeping well means getting enough sleep to feel good and stay healthy. How much sleep is enough varies among people.  The number of hours you sleep and how you feel when you wake up are both important. If you do not feel refreshed, you probably need more sleep. Another sign of not getting enough sleep is feeling tired during the day.  Experts recommend that adults get at least 7 or more hours of sleep per day. Children and older adults need more sleep.  Why is getting enough sleep important?  Getting enough quality sleep is a basic part of good health. When your sleep suffers, your physical health, mood, and your thoughts can suffer too. You may find yourself feeling more grumpy or stressed. Not getting enough sleep also can lead to serious problems, including injury, accidents, anxiety, and depression.  What might cause poor sleeping?  Many things can cause sleep problems, including:  Changes to your sleep schedule.  Stress. Stress can be caused by fear about a single event, such as giving a speech. Or you may have ongoing stress, such as worry about work or school.  Depression, anxiety, and other mental or emotional conditions.  Changes in your sleep habits or surroundings. This includes changes that happen where you sleep, such as noise, light, or sleeping in a different bed. It also includes changes in your sleep pattern, such as having jet lag or working a late shift.  Health problems, such as pain, breathing problems, and restless legs syndrome.  Lack of regular exercise.  Using alcohol, nicotine, or  "caffeine before bed.  How can you help yourself?  Here are some tips that may help you sleep more soundly and wake up feeling more refreshed.  Your sleeping area   Use your bedroom only for sleeping and sex. A bit of light reading may help you fall asleep. But if it doesn't, do your reading elsewhere in the house. Try not to use your TV, computer, smartphone, or tablet while you are in bed.  Be sure your bed is big enough to stretch out comfortably, especially if you have a sleep partner.  Keep your bedroom quiet, dark, and cool. Use curtains, blinds, or a sleep mask to block out light. To block out noise, use earplugs, soothing music, or a \"white noise\" machine.  Your evening and bedtime routine   Create a relaxing bedtime routine. You might want to take a warm shower or bath, or listen to soothing music.  Go to bed at the same time every night. And get up at the same time every morning, even if you feel tired.  What to avoid   Limit caffeine (coffee, tea, caffeinated sodas) during the day, and don't have any for at least 6 hours before bedtime.  Avoid drinking alcohol before bedtime. Alcohol can cause you to wake up more often during the night.  Try not to smoke or use tobacco, especially in the evening. Nicotine can keep you awake.  Limit naps during the day, especially close to bedtime.  Avoid lying in bed awake for too long. If you can't fall asleep or if you wake up in the middle of the night and can't get back to sleep within about 20 minutes, get out of bed and go to another room until you feel sleepy.  Avoid taking medicine right before bed that may keep you awake or make you feel hyper or energized. Your doctor can tell you if your medicine may do this and if you can take it earlier in the day.  If you can't sleep   Imagine yourself in a peaceful, pleasant scene. Focus on the details and feelings of being in a place that is relaxing.  Get up and do a quiet or boring activity until you feel sleepy.  Avoid " "drinking any liquids before going to bed to help prevent waking up often to use the bathroom.  Where can you learn more?  Go to https://www.[employer].net/patiented  Enter J942 in the search box to learn more about \"Learning About Sleeping Well.\"  Current as of: July 10, 2023  Content Version: 14.2 2024 [facility] [employer]   Care instructions adapted under license by your healthcare professional. If you have questions about a medical condition or this instruction, always ask your healthcare professional. Healthwise, Incorporated disclaims any warranty or liability for your use of this information.       "

## 2024-11-23 DIAGNOSIS — I10 ESSENTIAL HYPERTENSION: ICD-10-CM

## 2024-11-25 RX ORDER — POTASSIUM CHLORIDE 750 MG/1
10 TABLET, EXTENDED RELEASE ORAL DAILY
Qty: 90 TABLET | Refills: 0 | Status: SHIPPED | OUTPATIENT
Start: 2024-11-25

## 2024-12-02 RX ORDER — AMOXAPINE 50 MG/1
50 TABLET ORAL AT BEDTIME
Qty: 90 TABLET | Refills: 3 | Status: SHIPPED | OUTPATIENT
Start: 2024-12-02

## 2024-12-02 RX ORDER — POLYETHYLENE GLYCOL 3350 17 G/17G
17 POWDER, FOR SOLUTION ORAL DAILY PRN
Qty: 510 G | Refills: 3 | Status: SHIPPED | OUTPATIENT
Start: 2024-12-02

## 2024-12-02 RX ORDER — ESCITALOPRAM OXALATE 20 MG/1
20 TABLET ORAL DAILY
Qty: 90 TABLET | Refills: 3 | Status: SHIPPED | OUTPATIENT
Start: 2024-12-02

## 2024-12-02 RX ORDER — SUMATRIPTAN SUCCINATE 25 MG/1
25 TABLET ORAL
Qty: 20 TABLET | Refills: 1 | Status: SHIPPED | OUTPATIENT
Start: 2024-12-02

## 2024-12-07 DIAGNOSIS — E03.9 HYPOTHYROIDISM, UNSPECIFIED TYPE: ICD-10-CM

## 2024-12-07 DIAGNOSIS — D64.9 ANEMIA, UNSPECIFIED TYPE: ICD-10-CM

## 2024-12-07 DIAGNOSIS — K29.01 ACUTE GASTRITIS WITH HEMORRHAGE, UNSPECIFIED GASTRITIS TYPE: ICD-10-CM

## 2024-12-08 RX ORDER — PANTOPRAZOLE SODIUM 40 MG/1
40 TABLET, DELAYED RELEASE ORAL
Qty: 180 TABLET | Refills: 2 | Status: SHIPPED | OUTPATIENT
Start: 2024-12-08

## 2024-12-08 RX ORDER — LEVOTHYROXINE SODIUM 112 UG/1
112 TABLET ORAL DAILY
Qty: 90 TABLET | Refills: 3 | OUTPATIENT
Start: 2024-12-08

## 2024-12-09 RX ORDER — FOLIC ACID 1 MG/1
1000 TABLET ORAL DAILY
Qty: 100 TABLET | Refills: 2 | Status: SHIPPED | OUTPATIENT
Start: 2024-12-09

## 2025-01-17 DIAGNOSIS — I10 ESSENTIAL HYPERTENSION: ICD-10-CM

## 2025-01-19 RX ORDER — LISINOPRIL 20 MG/1
20 TABLET ORAL 2 TIMES DAILY
Qty: 180 TABLET | Refills: 0 | Status: SHIPPED | OUTPATIENT
Start: 2025-01-19

## 2025-01-25 DIAGNOSIS — I10 ESSENTIAL HYPERTENSION: ICD-10-CM

## 2025-01-27 RX ORDER — POTASSIUM CHLORIDE 750 MG/1
10 TABLET, EXTENDED RELEASE ORAL DAILY
Qty: 90 TABLET | Refills: 3 | Status: SHIPPED | OUTPATIENT
Start: 2025-01-27

## 2025-03-10 DIAGNOSIS — I10 ESSENTIAL HYPERTENSION: ICD-10-CM

## 2025-03-11 RX ORDER — ATENOLOL 100 MG/1
TABLET ORAL
Qty: 100 TABLET | Refills: 2 | OUTPATIENT
Start: 2025-03-11

## 2025-03-18 DIAGNOSIS — I10 ESSENTIAL HYPERTENSION: ICD-10-CM

## 2025-03-20 RX ORDER — LISINOPRIL 20 MG/1
20 TABLET ORAL 2 TIMES DAILY
Qty: 180 TABLET | Refills: 3 | Status: SHIPPED | OUTPATIENT
Start: 2025-03-20

## 2025-03-28 ENCOUNTER — TELEPHONE (OUTPATIENT)
Dept: FAMILY MEDICINE | Facility: CLINIC | Age: 70
End: 2025-03-28
Payer: COMMERCIAL

## 2025-03-28 DIAGNOSIS — E03.9 HYPOTHYROIDISM, UNSPECIFIED TYPE: ICD-10-CM

## 2025-03-28 RX ORDER — LEVOTHYROXINE SODIUM 112 UG/1
112 TABLET ORAL DAILY
Qty: 90 TABLET | Refills: 2 | Status: CANCELLED | OUTPATIENT
Start: 2025-03-28

## 2025-03-28 NOTE — TELEPHONE ENCOUNTER
Dr. Vaz --    Patient needs refill on Levothyroxine.   Patient last seen on 11/19/2024. Annual exam.   Lab last done on 11/19/2024.       JODY PattersonN RN  [facility]

## 2025-04-18 DIAGNOSIS — I10 ESSENTIAL HYPERTENSION: ICD-10-CM

## 2025-04-20 RX ORDER — ATENOLOL 100 MG/1
TABLET ORAL
Qty: 40 TABLET | Refills: 0 | Status: SHIPPED | OUTPATIENT
Start: 2025-04-20

## 2025-05-16 DIAGNOSIS — I10 ESSENTIAL HYPERTENSION: ICD-10-CM

## 2025-05-18 RX ORDER — ATENOLOL 100 MG/1
TABLET ORAL
Qty: 40 TABLET | Refills: 0 | Status: SHIPPED | OUTPATIENT
Start: 2025-05-18

## 2025-05-31 DIAGNOSIS — K29.01 ACUTE GASTRITIS WITH HEMORRHAGE, UNSPECIFIED GASTRITIS TYPE: ICD-10-CM

## 2025-06-01 RX ORDER — PANTOPRAZOLE SODIUM 40 MG/1
40 TABLET, DELAYED RELEASE ORAL
Qty: 200 TABLET | Refills: 2 | OUTPATIENT
Start: 2025-06-01

## 2025-06-23 DIAGNOSIS — I10 ESSENTIAL HYPERTENSION: ICD-10-CM

## 2025-06-24 RX ORDER — ATENOLOL 100 MG/1
TABLET ORAL
Qty: 40 TABLET | Refills: 8 | OUTPATIENT
Start: 2025-06-24

## 2025-07-08 DIAGNOSIS — E03.9 HYPOTHYROIDISM, UNSPECIFIED TYPE: ICD-10-CM

## 2025-07-08 RX ORDER — LEVOTHYROXINE SODIUM 112 UG/1
112 TABLET ORAL DAILY
Qty: 90 TABLET | Refills: 0 | Status: SHIPPED | OUTPATIENT
Start: 2025-07-08

## 2025-07-08 NOTE — TELEPHONE ENCOUNTER
Medication Question or Refill    Contacts       Contact Date/Time Type Contact Phone/Fax    07/08/2025 12:01 PM CDT Phone (Incoming) Mary Palafox (Self) [phone] (W)            What medication are you calling about (include dose and sig)?: Synthroid 112MG    Preferred Pharmacy:       [facility] [address]  [address]  [facility] [address]  [facility] [address]  Phone: [phone] Fax: [phone]        Controlled Substance Agreement on file:   CSA -- Patient Level:    CSA: None found at the patient level.       Who prescribed the medication?: Dr. Vaz    Do you need a refill? Yes    When did you use the medication last? 4 weeks    Patient offered an appointment? Yes: 07/15/2025    Do you have any questions or concerns?  Yes: Pt calling states she's been out of her meds for 4 wks. Pls call advise. Thanks.       Okay to leave a detailed message?: Yes at Cell number on file:    Telephone Information:   Mobile [phone]

## 2025-07-08 NOTE — TELEPHONE ENCOUNTER
Patient reported she still takes levothyroxine (SYNTHROID/LEVOTHROID) 112 MCG tablet  Has been out of it for a month now    Routed to PCP to review    Chino SCHMITT RN  [facility]

## 2025-07-15 ENCOUNTER — OFFICE VISIT (OUTPATIENT)
Dept: FAMILY MEDICINE | Facility: CLINIC | Age: 70
End: 2025-07-15
Payer: COMMERCIAL

## 2025-07-15 VITALS
BODY MASS INDEX: 21.97 KG/M2 | SYSTOLIC BLOOD PRESSURE: 156 MMHG | OXYGEN SATURATION: 97 % | WEIGHT: 109 LBS | TEMPERATURE: 97.1 F | DIASTOLIC BLOOD PRESSURE: 93 MMHG | HEART RATE: 58 BPM | HEIGHT: 59 IN | RESPIRATION RATE: 16 BRPM

## 2025-07-15 DIAGNOSIS — F33.1 MODERATE EPISODE OF RECURRENT MAJOR DEPRESSIVE DISORDER (H): ICD-10-CM

## 2025-07-15 DIAGNOSIS — Z12.11 SCREEN FOR COLON CANCER: ICD-10-CM

## 2025-07-15 DIAGNOSIS — D64.9 ANEMIA, UNSPECIFIED TYPE: ICD-10-CM

## 2025-07-15 DIAGNOSIS — E03.9 HYPOTHYROIDISM, UNSPECIFIED TYPE: ICD-10-CM

## 2025-07-15 DIAGNOSIS — G43.709 CHRONIC MIGRAINE WITHOUT AURA WITHOUT STATUS MIGRAINOSUS, NOT INTRACTABLE: ICD-10-CM

## 2025-07-15 DIAGNOSIS — K29.01 ACUTE GASTRITIS WITH HEMORRHAGE, UNSPECIFIED GASTRITIS TYPE: ICD-10-CM

## 2025-07-15 DIAGNOSIS — I10 ESSENTIAL HYPERTENSION: Primary | ICD-10-CM

## 2025-07-15 DIAGNOSIS — Z23 NEED FOR VACCINATION: ICD-10-CM

## 2025-07-15 LAB
ALBUMIN SERPL BCG-MCNC: 4.3 G/DL (ref 3.5–5.2)
ALP SERPL-CCNC: 94 U/L (ref 40–150)
ALT SERPL W P-5'-P-CCNC: 6 U/L (ref 0–50)
ANION GAP SERPL CALCULATED.3IONS-SCNC: 10 MMOL/L (ref 7–15)
AST SERPL W P-5'-P-CCNC: 16 U/L (ref 0–45)
BILIRUB SERPL-MCNC: 0.2 MG/DL
BUN SERPL-MCNC: 9.8 MG/DL (ref 8–23)
CALCIUM SERPL-MCNC: 9.4 MG/DL (ref 8.8–10.4)
CHLORIDE SERPL-SCNC: 107 MMOL/L (ref 98–107)
CHOLEST SERPL-MCNC: 165 MG/DL
CREAT SERPL-MCNC: 0.84 MG/DL (ref 0.51–0.95)
EGFRCR SERPLBLD CKD-EPI 2021: 74 ML/MIN/1.73M2
ERYTHROCYTE [DISTWIDTH] IN BLOOD BY AUTOMATED COUNT: 13.6 % (ref 10–15)
FASTING STATUS PATIENT QL REPORTED: NO
FASTING STATUS PATIENT QL REPORTED: NO
GLUCOSE SERPL-MCNC: 87 MG/DL (ref 70–99)
HCO3 SERPL-SCNC: 25 MMOL/L (ref 22–29)
HCT VFR BLD AUTO: 44.2 % (ref 35–47)
HDLC SERPL-MCNC: 46 MG/DL
HGB BLD-MCNC: 13.9 G/DL (ref 11.7–15.7)
LDLC SERPL CALC-MCNC: 98 MG/DL
MCH RBC QN AUTO: 30.3 PG (ref 26.5–33)
MCHC RBC AUTO-ENTMCNC: 31.4 G/DL (ref 31.5–36.5)
MCV RBC AUTO: 97 FL (ref 78–100)
NONHDLC SERPL-MCNC: 119 MG/DL
PLATELET # BLD AUTO: 148 10E3/UL (ref 150–450)
POTASSIUM SERPL-SCNC: 4.1 MMOL/L (ref 3.4–5.3)
PROT SERPL-MCNC: 6.9 G/DL (ref 6.4–8.3)
RBC # BLD AUTO: 4.58 10E6/UL (ref 3.8–5.2)
SODIUM SERPL-SCNC: 142 MMOL/L (ref 135–145)
T4 FREE SERPL-MCNC: 2.11 NG/DL (ref 0.9–1.7)
TRIGL SERPL-MCNC: 105 MG/DL
TSH SERPL DL<=0.005 MIU/L-ACNC: 0.01 UIU/ML (ref 0.3–4.2)
WBC # BLD AUTO: 5.5 10E3/UL (ref 4–11)

## 2025-07-15 PROCEDURE — 80053 COMPREHEN METABOLIC PANEL: CPT | Performed by: FAMILY MEDICINE

## 2025-07-15 PROCEDURE — 84439 ASSAY OF FREE THYROXINE: CPT | Performed by: FAMILY MEDICINE

## 2025-07-15 PROCEDURE — 99214 OFFICE O/P EST MOD 30 MIN: CPT | Performed by: FAMILY MEDICINE

## 2025-07-15 PROCEDURE — 3080F DIAST BP >= 90 MM HG: CPT | Performed by: FAMILY MEDICINE

## 2025-07-15 PROCEDURE — G2211 COMPLEX E/M VISIT ADD ON: HCPCS | Performed by: FAMILY MEDICINE

## 2025-07-15 PROCEDURE — 36415 COLL VENOUS BLD VENIPUNCTURE: CPT | Performed by: FAMILY MEDICINE

## 2025-07-15 PROCEDURE — 80061 LIPID PANEL: CPT | Performed by: FAMILY MEDICINE

## 2025-07-15 PROCEDURE — 3077F SYST BP >= 140 MM HG: CPT | Performed by: FAMILY MEDICINE

## 2025-07-15 PROCEDURE — 85027 COMPLETE CBC AUTOMATED: CPT | Performed by: FAMILY MEDICINE

## 2025-07-15 PROCEDURE — 84443 ASSAY THYROID STIM HORMONE: CPT | Performed by: FAMILY MEDICINE

## 2025-07-15 RX ORDER — ESCITALOPRAM OXALATE 20 MG/1
20 TABLET ORAL DAILY
Qty: 100 TABLET | Refills: 2 | Status: SHIPPED | OUTPATIENT
Start: 2025-07-15

## 2025-07-15 RX ORDER — SUMATRIPTAN SUCCINATE 25 MG/1
25 TABLET ORAL
Qty: 20 TABLET | Refills: 1 | Status: SHIPPED | OUTPATIENT
Start: 2025-07-15

## 2025-07-15 RX ORDER — PANTOPRAZOLE SODIUM 40 MG/1
40 TABLET, DELAYED RELEASE ORAL
Qty: 200 TABLET | Refills: 2 | Status: SHIPPED | OUTPATIENT
Start: 2025-07-15

## 2025-07-15 RX ORDER — FOLIC ACID 1 MG/1
1000 TABLET ORAL DAILY
Qty: 100 TABLET | Refills: 2 | Status: SHIPPED | OUTPATIENT
Start: 2025-07-15

## 2025-07-15 RX ORDER — AMOXAPINE 50 MG/1
50 TABLET ORAL AT BEDTIME
Qty: 100 TABLET | Refills: 2 | Status: SHIPPED | OUTPATIENT
Start: 2025-07-15

## 2025-07-15 RX ORDER — POTASSIUM CHLORIDE 750 MG/1
10 TABLET, EXTENDED RELEASE ORAL DAILY
Qty: 100 TABLET | Refills: 2 | Status: SHIPPED | OUTPATIENT
Start: 2025-07-15

## 2025-07-15 RX ORDER — ATENOLOL 100 MG/1
100 TABLET ORAL DAILY
Qty: 100 TABLET | Refills: 2 | Status: SHIPPED | OUTPATIENT
Start: 2025-07-15

## 2025-07-15 RX ORDER — LISINOPRIL 30 MG/1
30 TABLET ORAL 2 TIMES DAILY
Qty: 200 TABLET | Refills: 2 | Status: SHIPPED | OUTPATIENT
Start: 2025-07-15

## 2025-07-15 ASSESSMENT — PATIENT HEALTH QUESTIONNAIRE - PHQ9
SUM OF ALL RESPONSES TO PHQ QUESTIONS 1-9: 5
SUM OF ALL RESPONSES TO PHQ QUESTIONS 1-9: 5
10. IF YOU CHECKED OFF ANY PROBLEMS, HOW DIFFICULT HAVE THESE PROBLEMS MADE IT FOR YOU TO DO YOUR WORK, TAKE CARE OF THINGS AT HOME, OR GET ALONG WITH OTHER PEOPLE: NOT DIFFICULT AT ALL

## 2025-07-15 ASSESSMENT — ENCOUNTER SYMPTOMS: HEADACHES: 1
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
## 2025-08-17 ENCOUNTER — RESULTS FOLLOW-UP (OUTPATIENT)
Dept: FAMILY MEDICINE | Facility: CLINIC | Age: 70
End: 2025-08-17
Payer: COMMERCIAL

## 2025-08-17 DIAGNOSIS — E03.9 HYPOTHYROIDISM, UNSPECIFIED TYPE: ICD-10-CM

## 2025-08-17 RX ORDER — LEVOTHYROXINE SODIUM 100 UG/1
100 TABLET ORAL DAILY
Qty: 90 TABLET | Refills: 1 | Status: SHIPPED | OUTPATIENT
Start: 2025-08-17

## 2025-08-17 ASSESSMENT — ENCOUNTER SYMPTOMS
SEIZURES: 0
ABDOMINAL PAIN: 0
SORE THROAT: 0
BACK PAIN: 0
VOMITING: 0
HEMATURIA: 0
SHORTNESS OF BREATH: 0
EYE PAIN: 0
COLOR CHANGE: 0
COUGH: 0
ARTHRALGIAS: 0
CHILLS: 0
DYSURIA: 0
PALPITATIONS: 0
FEVER: 0

## (undated) DEVICE — SUCTION MANIFOLD NEPTUNE 2 SYS 1 PORT 702-025-000

## (undated) DEVICE — SOL WATER IRRIG 1000ML BOTTLE 2F7114

## (undated) DEVICE — FORCEP BIOPSY 2.3MM DISP COATED 000388

## (undated) DEVICE — TUBING SUCTION MEDI-VAC 1/4"X20' N620A - HE